# Patient Record
Sex: MALE | Race: WHITE | Employment: OTHER | URBAN - NONMETROPOLITAN AREA
[De-identification: names, ages, dates, MRNs, and addresses within clinical notes are randomized per-mention and may not be internally consistent; named-entity substitution may affect disease eponyms.]

---

## 2017-04-05 LAB
AVERAGE GLUCOSE: NORMAL
CREATININE URINE: NORMAL MG/DL
HBA1C MFR BLD: 6.2 %
MICROALBUMIN/CREAT 24H UR: 100 MG/G{CREAT}

## 2017-07-18 LAB
AVERAGE GLUCOSE: NORMAL
HBA1C MFR BLD: 5.9 %

## 2017-09-07 LAB — DIABETIC RETINOPATHY: NEGATIVE

## 2017-11-02 LAB
CHOLESTEROL, TOTAL: 129 MG/DL
CHOLESTEROL/HDL RATIO: 4
HDLC SERPL-MCNC: 32 MG/DL (ref 35–70)
LDL CHOLESTEROL CALCULATED: 50 MG/DL (ref 0–160)
TRIGL SERPL-MCNC: 233 MG/DL
VLDLC SERPL CALC-MCNC: ABNORMAL MG/DL

## 2017-11-20 LAB
AVERAGE GLUCOSE: NORMAL
HBA1C MFR BLD: 6 %

## 2018-05-17 LAB
AVERAGE GLUCOSE: NORMAL
HBA1C MFR BLD: 6.1 %

## 2019-06-04 PROBLEM — E11.9 TYPE 2 DIABETES MELLITUS WITHOUT COMPLICATION, WITHOUT LONG-TERM CURRENT USE OF INSULIN (HCC): Status: ACTIVE | Noted: 2019-06-04

## 2019-06-04 PROBLEM — K21.9 GASTROESOPHAGEAL REFLUX DISEASE WITHOUT ESOPHAGITIS: Status: ACTIVE | Noted: 2019-06-04

## 2019-06-04 PROBLEM — E78.5 HYPERLIPIDEMIA: Status: ACTIVE | Noted: 2019-06-04

## 2019-06-04 PROBLEM — I10 ESSENTIAL HYPERTENSION: Status: ACTIVE | Noted: 2019-06-04

## 2019-06-04 PROBLEM — I63.9 CVA (CEREBRAL VASCULAR ACCIDENT) (HCC): Status: ACTIVE | Noted: 2019-06-04

## 2019-06-04 PROBLEM — R41.3 MEMORY LOSS: Status: ACTIVE | Noted: 2019-06-04

## 2019-06-04 PROBLEM — M54.50 LOW BACK PAIN: Status: ACTIVE | Noted: 2019-06-04

## 2019-06-11 RX ORDER — ATORVASTATIN CALCIUM 40 MG/1
40 TABLET, FILM COATED ORAL DAILY
COMMUNITY
Start: 2019-04-09 | End: 2019-06-13 | Stop reason: SDUPTHER

## 2019-06-11 RX ORDER — CYCLOBENZAPRINE HCL 10 MG
10 TABLET ORAL 3 TIMES DAILY
COMMUNITY
Start: 2019-04-10 | End: 2019-06-13 | Stop reason: SDUPTHER

## 2019-06-11 RX ORDER — VITAMIN E 268 MG
400 CAPSULE ORAL DAILY
COMMUNITY

## 2019-06-11 RX ORDER — LISINOPRIL 20 MG/1
20 TABLET ORAL DAILY
COMMUNITY
Start: 2019-04-09 | End: 2019-06-13 | Stop reason: SDUPTHER

## 2019-06-11 RX ORDER — HYDRALAZINE HYDROCHLORIDE 25 MG/1
25 TABLET, FILM COATED ORAL 3 TIMES DAILY
COMMUNITY
Start: 2019-03-19 | End: 2019-06-13 | Stop reason: SDUPTHER

## 2019-06-11 RX ORDER — AMLODIPINE BESYLATE 10 MG/1
10 TABLET ORAL DAILY
COMMUNITY
Start: 2019-04-09 | End: 2019-06-13 | Stop reason: SDUPTHER

## 2019-06-11 RX ORDER — MAGNESIUM OXIDE 400 MG/1
400 TABLET ORAL DAILY
COMMUNITY

## 2019-06-11 RX ORDER — DONEPEZIL HYDROCHLORIDE 5 MG/1
5 TABLET, FILM COATED ORAL DAILY
COMMUNITY
Start: 2019-03-19 | End: 2019-06-13 | Stop reason: SDUPTHER

## 2019-06-11 RX ORDER — LANOLIN ALCOHOL/MO/W.PET/CERES
1000 CREAM (GRAM) TOPICAL DAILY
COMMUNITY

## 2019-06-11 RX ORDER — CLOPIDOGREL BISULFATE 75 MG/1
75 TABLET ORAL DAILY
COMMUNITY
Start: 2019-03-19 | End: 2019-06-13 | Stop reason: SDUPTHER

## 2019-06-13 ENCOUNTER — OFFICE VISIT (OUTPATIENT)
Dept: INTERNAL MEDICINE | Age: 64
End: 2019-06-13
Payer: COMMERCIAL

## 2019-06-13 VITALS
RESPIRATION RATE: 16 BRPM | HEART RATE: 104 BPM | DIASTOLIC BLOOD PRESSURE: 100 MMHG | SYSTOLIC BLOOD PRESSURE: 122 MMHG | HEIGHT: 70 IN | WEIGHT: 218 LBS | BODY MASS INDEX: 31.21 KG/M2

## 2019-06-13 DIAGNOSIS — E83.42 HYPOMAGNESEMIA: ICD-10-CM

## 2019-06-13 DIAGNOSIS — E78.5 HYPERLIPIDEMIA, UNSPECIFIED HYPERLIPIDEMIA TYPE: ICD-10-CM

## 2019-06-13 DIAGNOSIS — I10 ESSENTIAL HYPERTENSION: Primary | ICD-10-CM

## 2019-06-13 DIAGNOSIS — Z87.891 PERSONAL HISTORY OF TOBACCO USE: ICD-10-CM

## 2019-06-13 DIAGNOSIS — Z12.5 ENCOUNTER FOR SCREENING FOR MALIGNANT NEOPLASM OF PROSTATE: ICD-10-CM

## 2019-06-13 DIAGNOSIS — I63.9 CEREBROVASCULAR ACCIDENT (CVA), UNSPECIFIED MECHANISM (HCC): ICD-10-CM

## 2019-06-13 DIAGNOSIS — M54.5 LOW BACK PAIN, UNSPECIFIED BACK PAIN LATERALITY, UNSPECIFIED CHRONICITY, WITH SCIATICA PRESENCE UNSPECIFIED: ICD-10-CM

## 2019-06-13 DIAGNOSIS — R41.3 MEMORY LOSS: ICD-10-CM

## 2019-06-13 DIAGNOSIS — Z86.39 HISTORY OF VITAMIN B DEFICIENCY: ICD-10-CM

## 2019-06-13 DIAGNOSIS — E11.9 TYPE 2 DIABETES MELLITUS WITHOUT COMPLICATION, WITHOUT LONG-TERM CURRENT USE OF INSULIN (HCC): ICD-10-CM

## 2019-06-13 DIAGNOSIS — E55.9 VITAMIN D DEFICIENCY: ICD-10-CM

## 2019-06-13 PROCEDURE — 2022F DILAT RTA XM EVC RTNOPTHY: CPT | Performed by: INTERNAL MEDICINE

## 2019-06-13 PROCEDURE — 3046F HEMOGLOBIN A1C LEVEL >9.0%: CPT | Performed by: INTERNAL MEDICINE

## 2019-06-13 PROCEDURE — 3017F COLORECTAL CA SCREEN DOC REV: CPT | Performed by: INTERNAL MEDICINE

## 2019-06-13 PROCEDURE — G0296 VISIT TO DETERM LDCT ELIG: HCPCS | Performed by: INTERNAL MEDICINE

## 2019-06-13 PROCEDURE — 1036F TOBACCO NON-USER: CPT | Performed by: INTERNAL MEDICINE

## 2019-06-13 PROCEDURE — G8417 CALC BMI ABV UP PARAM F/U: HCPCS | Performed by: INTERNAL MEDICINE

## 2019-06-13 PROCEDURE — 99204 OFFICE O/P NEW MOD 45 MIN: CPT | Performed by: INTERNAL MEDICINE

## 2019-06-13 PROCEDURE — G8427 DOCREV CUR MEDS BY ELIG CLIN: HCPCS | Performed by: INTERNAL MEDICINE

## 2019-06-13 PROCEDURE — G8598 ASA/ANTIPLAT THER USED: HCPCS | Performed by: INTERNAL MEDICINE

## 2019-06-13 RX ORDER — HYDRALAZINE HYDROCHLORIDE 25 MG/1
25 TABLET, FILM COATED ORAL 3 TIMES DAILY
Qty: 270 TABLET | Refills: 3 | Status: SHIPPED | OUTPATIENT
Start: 2019-06-13 | End: 2020-06-09

## 2019-06-13 RX ORDER — CYCLOBENZAPRINE HCL 10 MG
10 TABLET ORAL 3 TIMES DAILY
Qty: 270 TABLET | Refills: 3 | Status: SHIPPED | OUTPATIENT
Start: 2019-06-13 | End: 2020-06-01

## 2019-06-13 RX ORDER — LISINOPRIL 20 MG/1
20 TABLET ORAL DAILY
Qty: 90 TABLET | Refills: 3 | Status: SHIPPED | OUTPATIENT
Start: 2019-06-13 | End: 2020-06-09

## 2019-06-13 RX ORDER — AMLODIPINE BESYLATE 10 MG/1
10 TABLET ORAL DAILY
Qty: 90 TABLET | Refills: 3 | Status: SHIPPED | OUTPATIENT
Start: 2019-06-13 | End: 2020-06-09

## 2019-06-13 RX ORDER — CLOPIDOGREL BISULFATE 75 MG/1
75 TABLET ORAL DAILY
Qty: 90 TABLET | Refills: 3 | Status: SHIPPED | OUTPATIENT
Start: 2019-06-13 | End: 2020-06-09

## 2019-06-13 RX ORDER — ATORVASTATIN CALCIUM 40 MG/1
40 TABLET, FILM COATED ORAL DAILY
Qty: 90 TABLET | Refills: 3 | Status: SHIPPED | OUTPATIENT
Start: 2019-06-13 | End: 2020-06-09

## 2019-06-13 RX ORDER — DONEPEZIL HYDROCHLORIDE 5 MG/1
5 TABLET, FILM COATED ORAL DAILY
Qty: 90 TABLET | Refills: 3 | Status: SHIPPED | OUTPATIENT
Start: 2019-06-13 | End: 2019-09-13 | Stop reason: SDUPTHER

## 2019-06-13 ASSESSMENT — ENCOUNTER SYMPTOMS
CONSTIPATION: 0
SHORTNESS OF BREATH: 0
EYE PAIN: 0
COUGH: 0
BACK PAIN: 0
NAUSEA: 0
VOMITING: 0
DIARRHEA: 0
BLOOD IN STOOL: 0
ABDOMINAL PAIN: 0

## 2019-06-13 ASSESSMENT — PATIENT HEALTH QUESTIONNAIRE - PHQ9
SUM OF ALL RESPONSES TO PHQ QUESTIONS 1-9: 0
1. LITTLE INTEREST OR PLEASURE IN DOING THINGS: 0
SUM OF ALL RESPONSES TO PHQ9 QUESTIONS 1 & 2: 0
2. FEELING DOWN, DEPRESSED OR HOPELESS: 0
SUM OF ALL RESPONSES TO PHQ QUESTIONS 1-9: 0

## 2019-06-13 NOTE — PROGRESS NOTES
[id number] Mireya [email or web address]  [facility]  Dept: [phone]  Dept Fax: [phone]  Loc: [phone]     Babak You is a 66 y.o. male who presents today for his medical conditions/complaintsas noted below. Babak You is c/o of   Chief Complaint   Patient presents with    Hypertension     New Patient    Diabetes    Hyperlipidemia         HPI:     Hypertension   This is a chronic (essential htn) problem. The current episode started more than 1 year ago. The problem has been waxing and waning since onset. The problem is controlled. Pertinent negatives include no chest pain, headaches, neck pain, palpitations or shortness of breath. Diabetes   He presents for his follow-up diabetic visit. He has type 2 diabetes mellitus. His disease course has been fluctuating. Pertinent negatives for hypoglycemia include no confusion, dizziness, headaches, nervousness/anxiousness or pallor. Pertinent negatives for diabetes include no chest pain, no polydipsia, no polyuria and no weakness. Hyperlipidemia   This is a chronic problem. The current episode started more than 1 year ago. The problem is controlled. Recent lipid tests were reviewed and are variable. Pertinent negatives include no chest pain or shortness of breath.        Hemoglobin A1C (%)   Date Value   05/17/2018 6.1   11/20/2017 6.0   07/18/2017 5.9            No results found for: LABMICR  LDL Calculated (mg/dL)   Date Value   11/02/2017 50         No results found for: AST, ALT, BUN  BP Readings from Last 3 Encounters:   06/13/19 (!) 122/100              Past Medical History:   Diagnosis Date    CVA (cerebral vascular accident) ([address]     Diabetes mellitus ([address]     History of back surgery     Measles     Osteoarthritis       Past Surgical History:   Procedure Laterality Date    BACK SURGERY      HERNIA REPAIR         Family History   Problem Relation Age of Onset    High Blood Pressure Mother    Devyn Fajardo Diabetes Maternal Grandmother           Social History     Tobacco Use    Smoking status: Former Smoker     Packs/day: 2.00     Years: 30.00     Pack years: 60.00     Types: Cigarettes     Start date: 1986     Last attempt to quit: 06/2016     Years since quitting: 3.0    Smokeless tobacco: Never Used   Substance Use Topics    Alcohol use: Not on file         Current Outpatient Medications   Medication Sig Dispense Refill    amLODIPine (NORVASC) 10 MG tablet Take 1 tablet by mouth daily 90 tablet 3    atorvastatin (LIPITOR) 40 MG tablet Take 1 tablet by mouth daily 90 tablet 3    clopidogrel (PLAVIX) 75 MG tablet Take 1 tablet by mouth daily 90 tablet 3    cyclobenzaprine (FLEXERIL) 10 MG tablet Take 1 tablet by mouth 3 times daily 270 tablet 3    donepezil (ARICEPT) 5 MG tablet Take 1 tablet by mouth daily 90 tablet 3    hydrALAZINE (APRESOLINE) 25 MG tablet Take 1 tablet by mouth 3 times daily 270 tablet 3    metFORMIN (GLUCOPHAGE) 500 MG tablet Take 1 tablet by mouth 2 times daily 180 tablet 3    lisinopril (PRINIVIL;ZESTRIL) 20 MG tablet Take 1 tablet by mouth daily 90 tablet 3    aspirin 81 MG tablet Take 81 mg by mouth daily      vitamin B-12 (CYANOCOBALAMIN) 1000 MCG tablet Take 1,000 mcg by mouth daily      Cholecalciferol (VITAMIN D3) 5000 units TABS Take 5,000 Units by mouth daily      vitamin E 400 UNIT capsule Take 400 Units by mouth daily      magnesium oxide (MAG-OX) 400 MG tablet Take 400 mg by mouth daily       No current facility-administered medications for this visit.       No Known Allergies    Health Maintenance   Topic Date Due    Potassium monitoring  1955    Creatinine monitoring  1955    Diabetic foot exam  06/14/1967    Colon cancer screen colonoscopy  06/14/2007    Low dose CT lung screening  06/14/2012    Diabetic microalbuminuria test  04/05/2018    Lipid screen  11/02/2018    A1C test (Diabetic or Prediabetic)  05/17/2019    DTaP/Tdap/Td vaccine (1 - Tdap) 06/13/2020 (Originally 6/14/1976)    Shingles Vaccine (1 of 2) 06/13/2020 (Originally 6/14/2007)    Pneumococcal 0-64 years Vaccine (1 of 1 - PPSV23) 06/13/2020 (Originally 6/14/1963)    Hepatitis C screen  06/13/2020 (Originally 1955)    HIV screen  06/13/2020 (Originally 6/14/1972)    Flu vaccine (Season Ended) 09/01/2019    Diabetic retinal exam  09/07/2019       Subjective:      Review of Systems   Constitutional: Negative for chills and fever. HENT: Negative for hearing loss. Eyes: Negative for pain and visual disturbance. Respiratory: Negative for cough and shortness of breath. Cardiovascular: Negative for chest pain, palpitations and leg swelling. Gastrointestinal: Negative for abdominal pain, blood in stool, constipation, diarrhea, nausea and vomiting. Endocrine: Negative for cold intolerance, polydipsia and polyuria. Genitourinary: Negative for difficulty urinating, dysuria and hematuria. Musculoskeletal: Negative for arthralgias, back pain, gait problem and neck pain. Skin: Negative for pallor and rash. Neurological: Negative for dizziness, weakness, numbness and headaches. Hematological: Negative for adenopathy. Does not bruise/bleed easily. Psychiatric/Behavioral: Negative for confusion. The patient is not nervous/anxious. Objective:     Physical Exam   Constitutional: He is oriented to person, place, and time. He appears well-developed and well-nourished. HENT:   Head: Normocephalic and atraumatic. Eyes: Pupils are equal, round, and reactive to light. EOM are normal.   Neck: Neck supple. Cardiovascular: Normal rate and regular rhythm. Exam reveals no gallop and no friction rub. No murmur heard. Pulmonary/Chest: Effort normal and breath sounds normal. He has no wheezes. He has no rales. Abdominal: Soft. He exhibits no distension and no mass. There is no tenderness. There is no rebound. Musculoskeletal: Normal range of motion.  He exhibits no Metabolic Panel     Standing Status:   Future     Standing Expiration Date:   6/12/2020    Lipid Panel     Standing Status:   Future     Standing Expiration Date:   6/12/2020     Order Specific Question:   Is Patient Fasting?/# of Hours     Answer:   yes    Vitamin D 25 Hydroxy     Standing Status:   Future     Standing Expiration Date:   6/12/2020    Magnesium     Standing Status:   Future     Standing Expiration Date:   6/12/2020    Psa screening     Standing Status:   Future     Standing Expiration Date:   6/12/2020    Hemoglobin A1C     Standing Status:   Future     Standing Expiration Date:   6/12/2020    Microalbumin, Ur     Standing Status:   Future     Standing Expiration Date:   6/12/2020    Hemoglobin A1C     Standing Status:   Future     Standing Expiration Date:   6/13/2020     DIABETES FOOT EXAM     Orders Placed This Encounter   Medications    amLODIPine (NORVASC) 10 MG tablet     Sig: Take 1 tablet by mouth daily     Dispense:  90 tablet     Refill:  3    atorvastatin (LIPITOR) 40 MG tablet     Sig: Take 1 tablet by mouth daily     Dispense:  90 tablet     Refill:  3    clopidogrel (PLAVIX) 75 MG tablet     Sig: Take 1 tablet by mouth daily     Dispense:  90 tablet     Refill:  3    cyclobenzaprine (FLEXERIL) 10 MG tablet     Sig: Take 1 tablet by mouth 3 times daily     Dispense:  270 tablet     Refill:  3    donepezil (ARICEPT) 5 MG tablet     Sig: Take 1 tablet by mouth daily     Dispense:  90 tablet     Refill:  3    hydrALAZINE (APRESOLINE) 25 MG tablet     Sig: Take 1 tablet by mouth 3 times daily     Dispense:  270 tablet     Refill:  3    metFORMIN (GLUCOPHAGE) 500 MG tablet     Sig: Take 1 tablet by mouth 2 times daily     Dispense:  180 tablet     Refill:  3    lisinopril (PRINIVIL;ZESTRIL) 20 MG tablet     Sig: Take 1 tablet by mouth daily     Dispense:  90 tablet     Refill:  3       Patientgiven educational materials - see patient instructions.   Discussed use, benefit,and side effects of prescribed medications. All patient questions answered. Ptvoiced understanding. Reviewed health maintenance. Instructed to continue currentmedications, diet and exercise. Patient agreed with treatment plan. Follow up asdirected.      Electronically signed by Danielle Hernandez MD on 6/13/2019 at 3:50 PM

## 2019-06-19 ENCOUNTER — TELEPHONE (OUTPATIENT)
Dept: ONCOLOGY | Age: 64
End: 2019-06-19

## 2019-06-19 NOTE — LETTER
6/19/2019        Alisa Gore  [address]  [address] [place] [address]    Dear Alisa Gore: Your healthcare provider has ordered a low dose CT scan of the chest for lung cancer screening. You will find enclosed, information about CT lung screening. Please review the statement of understanding, you will be asked to sign a copy of this at the time of your CT scan    If you have not already been contacted to make the appointment for your scan, please call our scheduling department at [phone]    Keep in mind that CT lung screening does not take the place of smoking cessation. If you are a current smoker, you will find enclosed smoking cessation resources. Please do not hesitate to contact me if you have any questions or concerns.     [address] [facility],      [facility] Screening [facility]  [phone]

## 2019-06-25 ENCOUNTER — HOSPITAL ENCOUNTER (OUTPATIENT)
Dept: CT IMAGING | Age: 64
Discharge: HOME | End: 2019-06-27
Payer: MEDICARE

## 2019-06-25 DIAGNOSIS — Z87.891 PERSONAL HISTORY OF TOBACCO USE: ICD-10-CM

## 2019-06-25 PROCEDURE — G0297 LDCT FOR LUNG CA SCREEN: HCPCS

## 2019-06-26 ENCOUNTER — OFFICE VISIT (OUTPATIENT)
Dept: OPHTHALMOLOGY | Age: 64
End: 2019-06-26
Payer: MEDICARE

## 2019-06-26 DIAGNOSIS — E11.9 TYPE 2 DIABETES MELLITUS WITHOUT COMPLICATION, WITHOUT LONG-TERM CURRENT USE OF INSULIN (HCC): ICD-10-CM

## 2019-06-26 DIAGNOSIS — H35.033 HYPERTENSIVE RETINOPATHY OF BOTH EYES: ICD-10-CM

## 2019-06-26 DIAGNOSIS — H25.813 COMBINED FORMS OF AGE-RELATED CATARACT OF BOTH EYES: ICD-10-CM

## 2019-06-26 DIAGNOSIS — H53.461 RIGHT HOMONYMOUS HEMIANOPSIA: Primary | ICD-10-CM

## 2019-06-26 DIAGNOSIS — I63.9 CEREBROVASCULAR ACCIDENT (CVA), UNSPECIFIED MECHANISM (HCC): ICD-10-CM

## 2019-06-26 PROCEDURE — G8417 CALC BMI ABV UP PARAM F/U: HCPCS | Performed by: OPHTHALMOLOGY

## 2019-06-26 PROCEDURE — 99204 OFFICE O/P NEW MOD 45 MIN: CPT | Performed by: OPHTHALMOLOGY

## 2019-06-26 PROCEDURE — G8427 DOCREV CUR MEDS BY ELIG CLIN: HCPCS | Performed by: OPHTHALMOLOGY

## 2019-06-26 PROCEDURE — 3017F COLORECTAL CA SCREEN DOC REV: CPT | Performed by: OPHTHALMOLOGY

## 2019-06-26 PROCEDURE — 2024F 7 FLD RTA PHOTO EVC RTNOPTHY: CPT | Performed by: OPHTHALMOLOGY

## 2019-06-26 PROCEDURE — G8598 ASA/ANTIPLAT THER USED: HCPCS | Performed by: OPHTHALMOLOGY

## 2019-06-26 PROCEDURE — 3046F HEMOGLOBIN A1C LEVEL >9.0%: CPT | Performed by: OPHTHALMOLOGY

## 2019-06-26 PROCEDURE — 1036F TOBACCO NON-USER: CPT | Performed by: OPHTHALMOLOGY

## 2019-06-26 PROCEDURE — 92250 FUNDUS PHOTOGRAPHY W/I&R: CPT | Performed by: OPHTHALMOLOGY

## 2019-06-26 RX ORDER — TROPICAMIDE 10 MG/ML
1 SOLUTION/ DROPS OPHTHALMIC ONCE
Status: DISCONTINUED | OUTPATIENT
Start: 2019-06-26 | End: 2021-07-06

## 2019-06-26 RX ORDER — PHENYLEPHRINE HCL 2.5 %
1 DROPS OPHTHALMIC (EYE) ONCE
Status: DISCONTINUED | OUTPATIENT
Start: 2019-06-26 | End: 2021-07-06

## 2019-06-26 ASSESSMENT — SLIT LAMP EXAM - LIDS
COMMENTS: MILD BLEPHARITIS. MILD MGD
COMMENTS: MILD BLEPHARITIS. MILD MGD

## 2019-06-26 ASSESSMENT — CONF VISUAL FIELD
OD_SUPERIOR_NASAL_RESTRICTION: 1
OS_INFERIOR_TEMPORAL_RESTRICTION: 1
OS_SUPERIOR_TEMPORAL_RESTRICTION: 1
OD_INFERIOR_NASAL_RESTRICTION: 1

## 2019-06-26 ASSESSMENT — ENCOUNTER SYMPTOMS
RESPIRATORY NEGATIVE: 0
ALLERGIC/IMMUNOLOGIC NEGATIVE: 0
GASTROINTESTINAL NEGATIVE: 0
EYES NEGATIVE: 0

## 2019-06-26 ASSESSMENT — TONOMETRY
OD_IOP_MMHG: 18
OS_IOP_MMHG: 19
IOP_METHOD: NON-CONTACT AIR PUFF

## 2019-06-26 ASSESSMENT — VISUAL ACUITY
CORRECTION_TYPE: GLASSES
OS_CC+: -1
OD_PH_CC: 20/25
METHOD: SNELLEN - LINEAR
OS_CC: 20/40
OS_PH_CC: 20/25

## 2019-06-26 NOTE — PROGRESS NOTES
Ijeoma Norton kae 60 y.o. male here for a complete eye exam.      Chief Complaint   Patient presents with   Alexandra Olguin Diabetic Exam    Ophthalmic Testing       HPI     Patient is here today to establish care and would like a Diabetic eye exam,     Last a1C was 05/17/18    6.1  Last complete exam: 09/18  Last glasses rx: 3+ years   Cataract sx: na  Pt had a stroke sept 2016 and since then pt has had some poor vision    Pt states that both eyes are blurry for several years with no improvement with eyeglasses. Review of Systems  ROS     Positive for: HENT    Negative for: Constitutional, Gastrointestinal, Neurological, Skin, Genitourinary, Musculoskeletal, Endocrine, Cardiovascular, Eyes, Respiratory, Psychiatric, Allergic/Imm, Heme/Lymph          Main Ophthalmology Exam     Slit Lamp Exam       Right Left    Lids/Lashes Mild Blepharitis. Mild MGD Mild Blepharitis.  Mild MGD    Conjunctiva/Sclera Clear and white Clear and white    Cornea Clear Clear    Anterior Chamber Deep, no cells, no flare Deep, no cells, no flare    Iris Round and reactive Round and reactive    Lens 2+ Nuclear sclerosis, 2+ Cortical cataract, 1+ Posterior subcapsular cataract 2+ Nuclear sclerosis, 2+ Cortical cataract, 1+ Posterior subcapsular cataract          Fundus Exam       Right Left    Vitreous Vitreous syneresis Vitreous syneresis    Disc No edema and no pallor No edema and no pallor    C/D Ratio Vertical 0.4 0.4    C/D Ratio Horizontal 0.35 0.35    Macula Normal architecture Normal architecture    Vessels AV nicking AV nicking    Periphery No breaks, tears, or detachments No breaks, tears, or detachments                   Tonometry     Tonometry (Non-contact air puff, 3:03 PM)       Right Left    Pressure 18 19              Visual Acuity     Visual Acuity (Snellen - Linear)       Right Left    Dist cc 20/30 20/40 -1    Dist ph cc 20/25 20/25    Correction:  Glasses               Not recorded         Confrontational Visual Fields     Visual Fields       Right Left    Restrictions Total superior nasal, inferior nasal deficiencies Total superior temporal, inferior temporal deficiencies               Extraocular Movement     Extraocular Movement       Right Left     Full, Ortho Full, Ortho               Not recorded          Past Medical History:   Diagnosis Date    [facility]) [facility]     Diabetes mellitus ([facility]     History of back surgery     Measles     Osteoarthritis           Current Outpatient Medications:     amLODIPine (NORVASC) 10 MG tablet, Take 1 tablet by mouth daily, Disp: 90 tablet, Rfl: 3    atorvastatin (LIPITOR) 40 MG tablet, Take 1 tablet by mouth daily, Disp: 90 tablet, Rfl: 3    clopidogrel (PLAVIX) 75 MG tablet, Take 1 tablet by mouth daily, Disp: 90 tablet, Rfl: 3    cyclobenzaprine (FLEXERIL) 10 MG tablet, Take 1 tablet by mouth 3 times daily, Disp: 270 tablet, Rfl: 3    donepezil (ARICEPT) 5 MG tablet, Take 1 tablet by mouth daily, Disp: 90 tablet, Rfl: 3    hydrALAZINE (APRESOLINE) 25 MG tablet, Take 1 tablet by mouth 3 times daily, Disp: 270 tablet, Rfl: 3    metFORMIN (GLUCOPHAGE) 500 MG tablet, Take 1 tablet by mouth 2 times daily, Disp: 180 tablet, Rfl: 3    lisinopril (PRINIVIL;ZESTRIL) 20 MG tablet, Take 1 tablet by mouth daily, Disp: 90 tablet, Rfl: 3    aspirin 81 MG tablet, Take 81 mg by mouth daily, Disp: , Rfl:     vitamin B-12 (CYANOCOBALAMIN) 1000 MCG tablet, Take 1,000 mcg by mouth daily, Disp: , Rfl:     Cholecalciferol (VITAMIN D3) 5000 units TABS, Take 5,000 Units by mouth daily, Disp: , Rfl:     vitamin E 400 UNIT capsule, Take 400 Units by mouth daily, Disp: , Rfl:     magnesium oxide (MAG-OX) 400 MG tablet, Take 400 mg by mouth daily, Disp: , Rfl:      No Known Allergies     IMPRESSION:  1. Right homonymous hemianopsia    2. Hypertensive retinopathy of both eyes    3. Cerebrovascular accident (CVA), unspecified mechanism ([facility]    4.  Combined forms of various IOL options including:    - Limbal Relaxing Incisions  - Monofocal Lenses  - Premium Lenses   - Toric Lenses    Counseled pt regarding pre-operative medications and  post-operative instructions. Dilated Pupil Size:    OD: 6  OS: 5    Malyugin Ring:  Possible    Trypan Blue: Possible    MiLoop: Possible    Planned Surgery:  Right Eye First    5. Type 2 diabetes mellitus without complication, without long-term current use of insulin ([address]    Counseled pt regarding Diabetes, Diabetic Eye Disease, and the   potential for significant loss of vision. Advised pt to follow up with primary care provider and to keep   blood sugar, blood pressure, and serum lipids as close to   normal as possible.     Electronically signed by Halley Guevara MD on 6/26/2019 at 3:31 PM

## 2019-07-02 ENCOUNTER — TELEPHONE (OUTPATIENT)
Dept: INTERNAL MEDICINE | Age: 64
End: 2019-07-02

## 2019-07-02 ENCOUNTER — HOSPITAL ENCOUNTER (OUTPATIENT)
Dept: LAB | Age: 64
Discharge: HOME | End: 2019-07-02
Payer: MEDICARE

## 2019-07-02 DIAGNOSIS — I10 ESSENTIAL HYPERTENSION: ICD-10-CM

## 2019-07-02 DIAGNOSIS — E11.9 TYPE 2 DIABETES MELLITUS WITHOUT COMPLICATION, WITHOUT LONG-TERM CURRENT USE OF INSULIN (HCC): ICD-10-CM

## 2019-07-02 DIAGNOSIS — R97.20 ELEVATED PSA: Primary | ICD-10-CM

## 2019-07-02 DIAGNOSIS — E83.42 HYPOMAGNESEMIA: ICD-10-CM

## 2019-07-02 DIAGNOSIS — Z12.5 ENCOUNTER FOR SCREENING FOR MALIGNANT NEOPLASM OF PROSTATE: ICD-10-CM

## 2019-07-02 DIAGNOSIS — E55.9 VITAMIN D DEFICIENCY: ICD-10-CM

## 2019-07-02 DIAGNOSIS — Z86.39 HISTORY OF VITAMIN B DEFICIENCY: ICD-10-CM

## 2019-07-02 DIAGNOSIS — E78.5 HYPERLIPIDEMIA, UNSPECIFIED HYPERLIPIDEMIA TYPE: ICD-10-CM

## 2019-07-02 LAB
ALBUMIN SERPL-MCNC: 4.5 G/DL (ref 3.5–5.2)
ALBUMIN/GLOBULIN RATIO: 1.8 (ref 1–2.5)
ALP BLD-CCNC: 67 U/L (ref 40–129)
ALT SERPL-CCNC: 19 U/L (ref 5–41)
ANION GAP SERPL CALCULATED.3IONS-SCNC: 14 MMOL/L (ref 9–17)
AST SERPL-CCNC: 13 U/L
BILIRUB SERPL-MCNC: 0.47 MG/DL (ref 0.3–1.2)
BUN BLDV-MCNC: 14 MG/DL (ref 8–23)
BUN/CREAT BLD: 14 (ref 9–20)
CALCIUM SERPL-MCNC: 9.7 MG/DL (ref 8.6–10.4)
CHLORIDE BLD-SCNC: 102 MMOL/L (ref 98–107)
CHOLESTEROL/HDL RATIO: 4.1
CHOLESTEROL: 142 MG/DL
CO2: 26 MMOL/L (ref 20–31)
CREAT SERPL-MCNC: 1.01 MG/DL (ref 0.7–1.2)
CREATININE URINE: 227.6 MG/DL (ref 39–259)
ESTIMATED AVERAGE GLUCOSE: 120 MG/DL
FOLATE: 14 NG/ML
GFR AFRICAN AMERICAN: >60 ML/MIN
GFR NON-AFRICAN AMERICAN: >60 ML/MIN
GFR SERPL CREATININE-BSD FRML MDRD: ABNORMAL ML/MIN/{1.73_M2}
GFR SERPL CREATININE-BSD FRML MDRD: ABNORMAL ML/MIN/{1.73_M2}
GLUCOSE BLD-MCNC: 111 MG/DL (ref 70–99)
HBA1C MFR BLD: 5.8 % (ref 4.8–5.9)
HDLC SERPL-MCNC: 35 MG/DL
LDL CHOLESTEROL: 51 MG/DL (ref 0–130)
MAGNESIUM: 1.9 MG/DL (ref 1.6–2.6)
MICROALBUMIN/CREAT 24H UR: 16 MG/L
MICROALBUMIN/CREAT UR-RTO: 7 MCG/MG CREAT
POTASSIUM SERPL-SCNC: 4.3 MMOL/L (ref 3.7–5.3)
PROSTATE SPECIFIC ANTIGEN: 5.86 UG/L
SODIUM BLD-SCNC: 142 MMOL/L (ref 135–144)
TOTAL PROTEIN: 7 G/DL (ref 6.4–8.3)
TRIGL SERPL-MCNC: 280 MG/DL
VITAMIN B-12: 758 PG/ML (ref 232–1245)
VLDLC SERPL CALC-MCNC: ABNORMAL MG/DL (ref 1–30)

## 2019-07-02 PROCEDURE — 80053 COMPREHEN METABOLIC PANEL: CPT

## 2019-07-02 PROCEDURE — 82043 UR ALBUMIN QUANTITATIVE: CPT

## 2019-07-02 PROCEDURE — 80061 LIPID PANEL: CPT

## 2019-07-02 PROCEDURE — 82570 ASSAY OF URINE CREATININE: CPT

## 2019-07-02 PROCEDURE — G0103 PSA SCREENING: HCPCS

## 2019-07-02 PROCEDURE — 82607 VITAMIN B-12: CPT

## 2019-07-02 PROCEDURE — 82746 ASSAY OF FOLIC ACID SERUM: CPT

## 2019-07-02 PROCEDURE — 36415 COLL VENOUS BLD VENIPUNCTURE: CPT

## 2019-07-02 PROCEDURE — 82306 VITAMIN D 25 HYDROXY: CPT

## 2019-07-02 PROCEDURE — 83036 HEMOGLOBIN GLYCOSYLATED A1C: CPT

## 2019-07-02 PROCEDURE — 83735 ASSAY OF MAGNESIUM: CPT

## 2019-07-03 LAB — VITAMIN D 25-HYDROXY: 81 NG/ML (ref 30–100)

## 2019-07-08 ENCOUNTER — HOSPITAL ENCOUNTER (OUTPATIENT)
Dept: LAB | Age: 64
Discharge: HOME | End: 2019-07-08
Payer: MEDICARE

## 2019-07-08 ENCOUNTER — OFFICE VISIT (OUTPATIENT)
Dept: NEUROLOGY | Age: 64
End: 2019-07-08
Payer: MEDICARE

## 2019-07-08 VITALS
WEIGHT: 217.6 LBS | BODY MASS INDEX: 32.23 KG/M2 | OXYGEN SATURATION: 95 % | SYSTOLIC BLOOD PRESSURE: 132 MMHG | HEIGHT: 69 IN | DIASTOLIC BLOOD PRESSURE: 80 MMHG | HEART RATE: 102 BPM

## 2019-07-08 DIAGNOSIS — I63.9 CEREBROVASCULAR ACCIDENT (CVA), UNSPECIFIED MECHANISM (HCC): ICD-10-CM

## 2019-07-08 DIAGNOSIS — E78.5 HYPERLIPIDEMIA, UNSPECIFIED HYPERLIPIDEMIA TYPE: ICD-10-CM

## 2019-07-08 DIAGNOSIS — I63.89 INFARCTION OF TEMPORAL LOBE (HCC): Primary | ICD-10-CM

## 2019-07-08 DIAGNOSIS — I63.9 OCCIPITAL CEREBRAL INFARCTION (HCC): ICD-10-CM

## 2019-07-08 DIAGNOSIS — M54.5 LOW BACK PAIN, UNSPECIFIED BACK PAIN LATERALITY, UNSPECIFIED CHRONICITY, WITH SCIATICA PRESENCE UNSPECIFIED: ICD-10-CM

## 2019-07-08 DIAGNOSIS — H53.461 RIGHT HOMONYMOUS HEMIANOPSIA: ICD-10-CM

## 2019-07-08 DIAGNOSIS — E11.49 TYPE 2 DIABETES MELLITUS WITH OTHER NEUROLOGIC COMPLICATION, WITHOUT LONG-TERM CURRENT USE OF INSULIN (HCC): ICD-10-CM

## 2019-07-08 DIAGNOSIS — Z98.890 HISTORY OF BACK SURGERY: ICD-10-CM

## 2019-07-08 DIAGNOSIS — H35.033 HYPERTENSIVE RETINOPATHY OF BOTH EYES: ICD-10-CM

## 2019-07-08 DIAGNOSIS — R41.3 MEMORY LOSS: ICD-10-CM

## 2019-07-08 DIAGNOSIS — H25.813 COMBINED FORMS OF AGE-RELATED CATARACT OF BOTH EYES: ICD-10-CM

## 2019-07-08 DIAGNOSIS — E11.9 TYPE 2 DIABETES MELLITUS WITHOUT COMPLICATION, WITHOUT LONG-TERM CURRENT USE OF INSULIN (HCC): ICD-10-CM

## 2019-07-08 LAB — TSH SERPL DL<=0.05 MIU/L-ACNC: 3.92 MIU/L (ref 0.3–5)

## 2019-07-08 PROCEDURE — 36415 COLL VENOUS BLD VENIPUNCTURE: CPT

## 2019-07-08 PROCEDURE — 82746 ASSAY OF FOLIC ACID SERUM: CPT

## 2019-07-08 PROCEDURE — 82607 VITAMIN B-12: CPT

## 2019-07-08 PROCEDURE — G8427 DOCREV CUR MEDS BY ELIG CLIN: HCPCS | Performed by: PSYCHIATRY & NEUROLOGY

## 2019-07-08 PROCEDURE — 2024F 7 FLD RTA PHOTO EVC RTNOPTHY: CPT | Performed by: PSYCHIATRY & NEUROLOGY

## 2019-07-08 PROCEDURE — 84443 ASSAY THYROID STIM HORMONE: CPT

## 2019-07-08 PROCEDURE — 99205 OFFICE O/P NEW HI 60 MIN: CPT | Performed by: PSYCHIATRY & NEUROLOGY

## 2019-07-08 PROCEDURE — G8417 CALC BMI ABV UP PARAM F/U: HCPCS | Performed by: PSYCHIATRY & NEUROLOGY

## 2019-07-08 ASSESSMENT — ENCOUNTER SYMPTOMS
EYE PAIN: 0
VISUAL CHANGE: 1
ABDOMINAL DISTENTION: 0
DIARRHEA: 0
APNEA: 0
BLOOD IN STOOL: 0
EYE DISCHARGE: 0
VOICE CHANGE: 0
EYE ITCHING: 0
NAUSEA: 0
BOWEL INCONTINENCE: 0
FACIAL SWELLING: 0
WHEEZING: 0
SWOLLEN GLANDS: 0
VOMITING: 0
SORE THROAT: 0
CONSTIPATION: 0
PHOTOPHOBIA: 0
BACK PAIN: 1
COUGH: 0
TROUBLE SWALLOWING: 0
SHORTNESS OF BREATH: 0
COLOR CHANGE: 0
CHEST TIGHTNESS: 0
SINUS PRESSURE: 0
CHOKING: 0
EYE REDNESS: 0
CHANGE IN BOWEL HABIT: 0
ABDOMINAL PAIN: 0

## 2019-07-08 NOTE — PROGRESS NOTES
[facility]  Neurology  [address] [place] [address]  OZLYW:[phone]   Fax: [phone]    SUBJECTIVE:     PATIENT ID:  Guille Mendenhall is a   LEFT     HANDED 60 y.o. male. Cerebrovascular Accident   This is a chronic problem. Episode onset: SEPT. 2016   The problem has been unchanged. Associated symptoms include numbness and a visual change. Pertinent negatives include no abdominal pain, anorexia, arthralgias, change in bowel habit, chest pain, chills, congestion, coughing, diaphoresis, fatigue, fever, headaches, joint swelling, myalgias, nausea, neck pain, rash, sore throat, swollen glands, urinary symptoms, vertigo, vomiting or weakness. Nothing aggravates the symptoms. Treatments tried: ASA   AND PLAVIX   The treatment provided moderate relief. Neurologic Problem   The patient's primary symptoms include clumsiness, focal sensory loss, a loss of balance, memory loss and a visual change. The patient's pertinent negatives include no altered mental status, focal weakness, near-syncope, slurred speech, syncope or weakness. This is a chronic problem. Episode onset: MORE  THAN   2-3  YEARS. The neurological problem developed insidiously. There was lower extremity, left-sided and right-sided focality noted. Associated symptoms include back pain. Pertinent negatives include no abdominal pain, auditory change, aura, bladder incontinence, bowel incontinence, chest pain, confusion, diaphoresis, dizziness, fatigue, fever, headaches, light-headedness, nausea, neck pain, palpitations, shortness of breath, vertigo or vomiting. Past treatments include medication, bed rest and sleep. The treatment provided mild relief. His past medical history is significant for a CVA and dementia. There is no history of a bleeding disorder, a clotting disorder, head trauma, liver disease, mood changes or seizures.            History obtained from  The patient     And   HIS  WIFE       and other ([facility]     Diabetes mellitus ([facility]     History of back surgery     Measles     Osteoarthritis          Past Surgical History:   Procedure Laterality Date    BACK SURGERY      HERNIA REPAIR           Current Outpatient Medications   Medication Sig Dispense Refill    amLODIPine (NORVASC) 10 MG tablet Take 1 tablet by mouth daily 90 tablet 3    atorvastatin (LIPITOR) 40 MG tablet Take 1 tablet by mouth daily 90 tablet 3    clopidogrel (PLAVIX) 75 MG tablet Take 1 tablet by mouth daily 90 tablet 3    cyclobenzaprine (FLEXERIL) 10 MG tablet Take 1 tablet by mouth 3 times daily 270 tablet 3    donepezil (ARICEPT) 5 MG tablet Take 1 tablet by mouth daily 90 tablet 3    hydrALAZINE (APRESOLINE) 25 MG tablet Take 1 tablet by mouth 3 times daily 270 tablet 3    metFORMIN (GLUCOPHAGE) 500 MG tablet Take 1 tablet by mouth 2 times daily 180 tablet 3    lisinopril (PRINIVIL;ZESTRIL) 20 MG tablet Take 1 tablet by mouth daily 90 tablet 3    aspirin 81 MG tablet Take 81 mg by mouth daily      vitamin B-12 (CYANOCOBALAMIN) 1000 MCG tablet Take 1,000 mcg by mouth daily      Cholecalciferol (VITAMIN D3) 5000 units TABS Take 5,000 Units by mouth daily      vitamin E 400 UNIT capsule Take 400 Units by mouth daily      magnesium oxide (MAG-OX) 400 MG tablet Take 400 mg by mouth daily       Current Facility-Administered Medications   Medication Dose Route Frequency Provider Last Rate Last Dose    phenylephrine (MYDFRIN) 2.5 % ophthalmic solution 1 drop  1 drop Both Eyes Once Yani Gregorio MD        tropicamide (MYDRIACYL) 1 % ophthalmic solution 1 drop  1 drop Both Eyes Once Yani Gregorio MD             No Known Allergies      Family History   Problem Relation Age of Onset    High Blood Pressure Mother     Cataracts Mother     Diabetes Maternal Grandmother          Social History     Socioeconomic History    Marital status:      Spouse name: Not on file    Number of children: Not on file    Years of education: Of  Current  Symptoms  And  Or                  If patient  Develops   Any additional  New  NeurologicalSymptoms                  Or  Significant  Concerns   Should  Call  911 or  Go  To  Emergency  Department  For  Further  Immediate  Evaluation. The   Above  Were  Reviewed  With  Patient     AND  HIS  WIFE      and                       questions  Answered  In  Detail. More   Than  50% of face  To face Time   Was  Spent  On  Counseling                    And   Coordination  Of  Care   Of   Patient's  multiple   Neurological  Problems                         And   Comorbid  Medical   Conditions. Electronically signed by Wei Perrin MD    Board Certified in  Neurology &  In  [facility] ([facility])      DISCLAIMER:   Although every effort was made to ensure the accuracy of this  electronictranscription, some errors in transcription may have occurred. GENERAL PATIENT INSTRUCTIONS:      A Healthy Lifestyle: Care Instructions   Your Care Instructions   A healthy lifestyle can help you feel good, stay at ahealthy weight, and have plenty of energy for both work and play. A healthy lifestyle is something you can share with your whole family.  A healthy lifestyle also can lower your risk for serious health problems, such ashigh blood pressure, heart disease, and diabetes.  You can follow a few steps listed below to improve your health and the health of your family.  Follow-up careis a key part of your treatment and safety. Be sure to make and go to all appointments, and call your doctor if you are having problems. Its also a good idea to know your test results and keep a list of the medicines you take.  How can you care for yourself at home?  Do not eat too much sugar, fat, or fast foods. You can still have dessert and treats nowand then. The goal is moderation.    Start small to improve to 2 drinks a day for men, 1drink a day for women) are okay. But drinking too much can lead to liver problems, high blood pressure, and other health problems.  health   If you have a family, there are many things you can do together to improve your health.  Eat meals together as a family as often as possible.  Eat healthy foods. This includes fruits, vegetables, lean meats and dairy, and whole grains.  Include your family in your fitness plan. Most peoplethink of activities such as jogging or tennis as the way to fitness, but there are many ways you and your family can be more active. Anything that makes you breathe hard and gets your heart pumping is exercise. Here are sometips:   Walk to do errands or to take your child to school or the bus.  Go for a family bike ride after dinner instead of watching TV.  Where can you learn more?  Go toHot Mix [email or web address] and sign in to your [employer] account. Enter A753 in the Search HealthInformation box to learn more about \"A Healthy Lifestyle: Care Instructions. \"     If you do not have anaccount, please click on the \"Sign Up Now\" link.  Current as of: July 26, 2016   Content Version: 11.2   © 9121-6574 [employer]. Care instructions adapted under license by [facility] ([facility]). If you have questions about a medical condition or this instruction, always ask your healthcare professional. [employer] disclaims any warranty or liability for your use of this information.

## 2019-07-09 ENCOUNTER — HOSPITAL ENCOUNTER (OUTPATIENT)
Dept: NEUROLOGY | Age: 64
Discharge: HOME | End: 2019-07-09
Payer: MEDICARE

## 2019-07-09 DIAGNOSIS — H53.461 RIGHT HOMONYMOUS HEMIANOPSIA: ICD-10-CM

## 2019-07-09 DIAGNOSIS — R41.3 MEMORY LOSS: ICD-10-CM

## 2019-07-09 DIAGNOSIS — I63.9 CEREBROVASCULAR ACCIDENT (CVA), UNSPECIFIED MECHANISM (HCC): ICD-10-CM

## 2019-07-09 PROCEDURE — 95813 EEG EXTND MNTR 61-119 MIN: CPT

## 2019-07-09 PROCEDURE — 95957 EEG DIGITAL ANALYSIS: CPT

## 2019-07-10 LAB
FOLATE: 18 NG/ML
VITAMIN B-12: 857 PG/ML (ref 232–1245)

## 2019-07-12 ENCOUNTER — HOSPITAL ENCOUNTER (OUTPATIENT)
Dept: INTERVENTIONAL RADIOLOGY/VASCULAR | Age: 64
Discharge: HOME | End: 2019-07-14
Payer: MEDICARE

## 2019-07-12 ENCOUNTER — HOSPITAL ENCOUNTER (OUTPATIENT)
Dept: CT IMAGING | Age: 64
Discharge: HOME | End: 2019-07-14
Payer: MEDICARE

## 2019-07-12 DIAGNOSIS — I63.9 CEREBROVASCULAR ACCIDENT (CVA), UNSPECIFIED MECHANISM (HCC): ICD-10-CM

## 2019-07-12 DIAGNOSIS — R41.3 MEMORY LOSS: ICD-10-CM

## 2019-07-12 DIAGNOSIS — H53.461 RIGHT HOMONYMOUS HEMIANOPSIA: ICD-10-CM

## 2019-07-12 PROCEDURE — 70450 CT HEAD/BRAIN W/O DYE: CPT

## 2019-07-12 PROCEDURE — 93880 EXTRACRANIAL BILAT STUDY: CPT

## 2019-07-16 ENCOUNTER — OFFICE VISIT (OUTPATIENT)
Dept: UROLOGY | Age: 64
End: 2019-07-16
Payer: MEDICARE

## 2019-07-16 VITALS
BODY MASS INDEX: 32.11 KG/M2 | DIASTOLIC BLOOD PRESSURE: 86 MMHG | SYSTOLIC BLOOD PRESSURE: 130 MMHG | HEART RATE: 102 BPM | HEIGHT: 69 IN | WEIGHT: 216.8 LBS

## 2019-07-16 DIAGNOSIS — R97.20 ELEVATED PSA: Primary | ICD-10-CM

## 2019-07-16 PROCEDURE — G8598 ASA/ANTIPLAT THER USED: HCPCS | Performed by: UROLOGY

## 2019-07-16 PROCEDURE — 3017F COLORECTAL CA SCREEN DOC REV: CPT | Performed by: UROLOGY

## 2019-07-16 PROCEDURE — 1036F TOBACCO NON-USER: CPT | Performed by: UROLOGY

## 2019-07-16 PROCEDURE — G8417 CALC BMI ABV UP PARAM F/U: HCPCS | Performed by: UROLOGY

## 2019-07-16 PROCEDURE — 99204 OFFICE O/P NEW MOD 45 MIN: CPT | Performed by: UROLOGY

## 2019-07-16 PROCEDURE — G8427 DOCREV CUR MEDS BY ELIG CLIN: HCPCS | Performed by: UROLOGY

## 2019-07-16 NOTE — PROGRESS NOTES
Adeline Neville MD        [address] [address]  Dept: [phone]  Dept Fax: [phone]  Loc: [facility] Urology Office Note - New Patient    Patient:  Angel Harvey  YOB: 1955  Date: 7/31/2019    The patient is a 60 y.o. male who presents today for evaluation of the following problems:   Chief Complaint   Patient presents with    Elevated PSA    referred/consultation requested by Rudy Gayle MD.    HISTORY OF PRESENT ILLNESS:     Onset was  Weeks ago  Overall, the problem(s) are worse. Severity is described as moderate. Associated Symptoms: No dysuria, no gross hematuria. Current Pain Severity: 0    Memory issues from previous stroke in 2016. Former smoker  No problems with voiding  No family hx of prostate cancer      Requested/reviewed records from Rudy Gayle MD office and/or outside physician/EMR    (Patient's old records have been requested, reviewed and pertinent findings summarized in today's note.)    Procedures Today: N/A      Last several PSA's:  Lab Results   Component Value Date    PSA 5.86 (H) 07/02/2019       Last total testosterone:  No results found for: TESTOSTERONE    Urinalysis today:  No results found for this visit on 07/16/19.     Last BUN and creatinine:  Lab Results   Component Value Date    BUN 14 07/02/2019     Lab Results   Component Value Date    CREATININE 1.01 07/02/2019       Additional Lab/Culture results: none    Imaging Reviewed during this Office Visit:   Adeline Neville MD independently reviewed the images and verified the radiology reports from:    Ct Head Wo Contrast    Result Date: 7/12/2019  EXAMINATION: CT OF THE HEAD WITHOUT CONTRAST  7/12/2019 4:17 pm TECHNIQUE: CT of the head was performed without the administration of intravenous contrast. Dose modulation, iterative reconstruction, and/or weight based adjustment of the mA/kV was utilized to reduce the radiation dose to as low as reasonably achievable. COMPARISON: None. HISTORY: ORDERING SYSTEM PROVIDED HISTORY: Right homonymous hemianopsia TECHNOLOGIST PROVIDED HISTORY: history of CVA Reason for Exam: Balance and memory loss with vision changes. History of CVA 1 year ago Acuity: Chronic Type of Exam: Initial FINDINGS: BRAIN/VENTRICLES: There is no acute intracranial hemorrhage, mass effect or midline shift. No abnormal extra-axial fluid collection. The gray-white differentiation is maintained without evidence of an acute infarct. There is no evidence of hydrocephalus. Old ischemic strokes with resultant encephalomalacia in the left occipital lobe and posterior aspect of right temporal lobe. ORBITS: The visualized portion of the orbits demonstrate no acute abnormality. SINUSES: The visualized paranasal sinuses and mastoid air cells demonstrate no acute abnormality. SOFT TISSUES/SKULL:  No acute abnormality of the visualized skull or soft tissues. Small sebaceous cyst with calcifications in the subcutaneous fat of the posterior parietal scalp along the midline. No acute intracranial abnormality. Old ischemic strokes with resultant encephalomalacia in the left occipital lobe and posterior aspect of right temporal lobe. Vl Dup Carotid Bilateral    Result Date: 7/12/2019  EXAMINATION: ULTRASOUND EVALUATION OF THE CAROTID ARTERIES 7/12/2019 COMPARISON: None. HISTORY: ORDERING SYSTEM PROVIDED HISTORY: Right homonymous hemianopsia TECHNOLOGIST PROVIDED HISTORY: history of CVA Reason for Exam: right homonymous hemianopsia Acuity: Chronic Type of Exam: Subsequent/Follow-up FINDINGS: RIGHT: The right common carotid artery demonstrates peak systolic velocities of 65, 61 cm/sec in the proximal and distal segments respectively. The right internal carotid artery demonstrates the systolic velocities of 74, 60, 38 cm/sec in the proximal, mid and distal segments respectively. The external carotid artery is patent.

## 2019-08-12 ENCOUNTER — HOSPITAL ENCOUNTER (OUTPATIENT)
Dept: NEUROLOGY | Age: 64
Discharge: HOME | End: 2019-08-12
Payer: MEDICARE

## 2019-08-12 DIAGNOSIS — R41.3 MEMORY LOSS: ICD-10-CM

## 2019-08-12 DIAGNOSIS — I63.89 INFARCTION OF TEMPORAL LOBE (HCC): ICD-10-CM

## 2019-08-12 DIAGNOSIS — H53.461 RIGHT HOMONYMOUS HEMIANOPSIA: ICD-10-CM

## 2019-08-12 DIAGNOSIS — I63.9 OCCIPITAL CEREBRAL INFARCTION (HCC): ICD-10-CM

## 2019-08-12 DIAGNOSIS — I63.9 CEREBROVASCULAR ACCIDENT (CVA), UNSPECIFIED MECHANISM (HCC): ICD-10-CM

## 2019-08-12 PROCEDURE — 93886 INTRACRANIAL COMPLETE STUDY: CPT

## 2019-08-12 PROCEDURE — 93892 TCD EMBOLI DETECT W/O INJ: CPT

## 2019-08-14 NOTE — PROCEDURES
[address]                 [facility],Building [address] [place] [id number]                                      TCD      PATIENT NAME: Nayla Forrest                 :        1955  MED REC NO:   [id number]                             ROOM:  ACCOUNT NO:   [de-identified]                           ADMIT DATE: 2019  PROVIDER:     Lexy Leggett MD    DATE OF STUDY:  2019      TECHNIQUE:  Transcranial Doppler study of intracranial arteries was  performed using [employer] equipment with digital Doppler technology, with  high resolution [address] [place] M-mode display and Multigate Spectral Windows  and 2 MHz Doppler probes via temporal, suboccipital and orbital  approaches. Transcranial Doppler study of the intracranial arteries was also  performed for emboli detection without intravenous microbubble injection  using continuous soundtrack, M-Mode with Multigate Spectral displays and  soundtrack displays with continuous bilateral monitoring. CLINICAL DATA:      The patient is 60years old male with a history of  hypertension, hyperlipidemia, memory loss, type 2 diabetes, stroke,  occipital cerebral infarction, visual impairment, infarction of temporal  lobes. The purpose of the study is to evaluate for stroke, intracranial focal  stenosis, flow abnormalities, vertebrobasilar insufficiency evaluations. SUMMARY:      Mean flow velocities in the right and left anterior, middle,  and posterior cerebral arteries appear appropriate for the patient's age  with normal PI values. Mean flow velocities in the right vertebral artery are borderline with  normal PI values. Mean flow velocities in the left vertebral artery are low with elevated  PI values. Mean flow velocities in the basilar artery are low with  normal PI values.         TCD OF INTRACRANIAL ARTERIES FOR EMBOLI DETECTION:      Review and analysis of the waveforms and continuous soundtrack

## 2019-08-16 ENCOUNTER — HOSPITAL ENCOUNTER (OUTPATIENT)
Dept: LAB | Age: 64
Discharge: HOME | End: 2019-08-16
Payer: COMMERCIAL

## 2019-08-16 DIAGNOSIS — R97.20 ELEVATED PSA: ICD-10-CM

## 2019-08-16 LAB — PROSTATE SPECIFIC ANTIGEN: 1.73 UG/L

## 2019-08-16 PROCEDURE — 36415 COLL VENOUS BLD VENIPUNCTURE: CPT

## 2019-08-16 PROCEDURE — 84153 ASSAY OF PSA TOTAL: CPT

## 2019-08-19 ENCOUNTER — OFFICE VISIT (OUTPATIENT)
Dept: UROLOGY | Age: 64
End: 2019-08-19
Payer: MEDICARE

## 2019-08-19 VITALS
DIASTOLIC BLOOD PRESSURE: 78 MMHG | WEIGHT: 216.71 LBS | HEIGHT: 69 IN | BODY MASS INDEX: 32.1 KG/M2 | HEART RATE: 74 BPM | SYSTOLIC BLOOD PRESSURE: 118 MMHG

## 2019-08-19 DIAGNOSIS — R97.20 ELEVATED PSA: Primary | ICD-10-CM

## 2019-08-19 PROCEDURE — G8598 ASA/ANTIPLAT THER USED: HCPCS | Performed by: UROLOGY

## 2019-08-19 PROCEDURE — 3017F COLORECTAL CA SCREEN DOC REV: CPT | Performed by: UROLOGY

## 2019-08-19 PROCEDURE — G8427 DOCREV CUR MEDS BY ELIG CLIN: HCPCS | Performed by: UROLOGY

## 2019-08-19 PROCEDURE — 1036F TOBACCO NON-USER: CPT | Performed by: UROLOGY

## 2019-08-19 PROCEDURE — G8417 CALC BMI ABV UP PARAM F/U: HCPCS | Performed by: UROLOGY

## 2019-08-19 PROCEDURE — 99212 OFFICE O/P EST SF 10 MIN: CPT | Performed by: UROLOGY

## 2019-08-19 NOTE — PROGRESS NOTES
Benito Jimenez MD        [address] [facility]  [address]  [address] [address]  Dept: [phone]  Dept Fax: [phone]  Loc: [phone]      [facility] Urology Office Note - Follow up patient    Patient:  Guille Mendenhall  YOB: 1955  Date: 8/19/2019    The patient is a 60 y.o. male who presents today for evaluation of the following problems:   Chief Complaint   Patient presents with    Other     6 week PSA    referred/consultation requested by Meryl Martínez MD.    HISTORY OF PRESENT ILLNESS:     Onset was  Weeks ago  Overall, the problem(s) are better  Severity is described as moderate. Associated Symptoms: No dysuria, no gross hematuria. Current Pain Severity: 0    Here with PSA recheck  Doing well, no complaints  PSA has dropped    Summary of Previous Records:  Memory issues from previous stroke in 2016. Former smoker  No problems with voiding  No family hx of prostate cancer      Requested/reviewed records from Meryl Martínez MD office and/or outside physician/EMR    (Patient's old records have been requested, reviewed and pertinent findings summarized in today's note.)    Procedures Today: N/A      Last several PSA's:  Lab Results   Component Value Date    PSA 1.73 08/16/2019    PSA 5.86 (H) 07/02/2019       Last total testosterone:  No results found for: TESTOSTERONE    Urinalysis today:  No results found for this visit on 08/19/19.     Last BUN and creatinine:  Lab Results   Component Value Date    BUN 14 07/02/2019     Lab Results   Component Value Date    CREATININE 1.01 07/02/2019       Additional Lab/Culture results: none    Imaging Reviewed during this Office Visit:   Benito Jimenez MD independently reviewed the images and verified the radiology reports from:    Ct Head Wo Contrast    Result Date: 7/12/2019  EXAMINATION: CT OF THE HEAD WITHOUT CONTRAST  7/12/2019 4:17 pm TECHNIQUE: CT of the head was performed without the administration of Abby Lyn MD

## 2019-09-13 ENCOUNTER — OFFICE VISIT (OUTPATIENT)
Dept: NEUROLOGY | Age: 64
End: 2019-09-13
Payer: MEDICARE

## 2019-09-13 VITALS
BODY MASS INDEX: 32.1 KG/M2 | HEIGHT: 69 IN | WEIGHT: 216.71 LBS | RESPIRATION RATE: 20 BRPM | DIASTOLIC BLOOD PRESSURE: 74 MMHG | HEART RATE: 88 BPM | SYSTOLIC BLOOD PRESSURE: 132 MMHG

## 2019-09-13 DIAGNOSIS — I63.89 INFARCTION OF TEMPORAL LOBE (HCC): ICD-10-CM

## 2019-09-13 DIAGNOSIS — H53.461 RIGHT HOMONYMOUS HEMIANOPSIA: ICD-10-CM

## 2019-09-13 DIAGNOSIS — E78.5 HYPERLIPIDEMIA, UNSPECIFIED HYPERLIPIDEMIA TYPE: ICD-10-CM

## 2019-09-13 DIAGNOSIS — K21.9 GASTROESOPHAGEAL REFLUX DISEASE WITHOUT ESOPHAGITIS: ICD-10-CM

## 2019-09-13 DIAGNOSIS — E11.49 TYPE 2 DIABETES MELLITUS WITH OTHER NEUROLOGIC COMPLICATION, WITHOUT LONG-TERM CURRENT USE OF INSULIN (HCC): ICD-10-CM

## 2019-09-13 DIAGNOSIS — I10 ESSENTIAL HYPERTENSION: ICD-10-CM

## 2019-09-13 DIAGNOSIS — I63.9 OCCIPITAL CEREBRAL INFARCTION (HCC): ICD-10-CM

## 2019-09-13 DIAGNOSIS — I63.9 CEREBROVASCULAR ACCIDENT (CVA), UNSPECIFIED MECHANISM (HCC): Primary | ICD-10-CM

## 2019-09-13 DIAGNOSIS — H25.813 COMBINED FORMS OF AGE-RELATED CATARACT OF BOTH EYES: ICD-10-CM

## 2019-09-13 DIAGNOSIS — R41.3 MEMORY LOSS: ICD-10-CM

## 2019-09-13 DIAGNOSIS — M54.5 LOW BACK PAIN, UNSPECIFIED BACK PAIN LATERALITY, UNSPECIFIED CHRONICITY, WITH SCIATICA PRESENCE UNSPECIFIED: ICD-10-CM

## 2019-09-13 DIAGNOSIS — Z98.890 HISTORY OF BACK SURGERY: ICD-10-CM

## 2019-09-13 DIAGNOSIS — H35.033 HYPERTENSIVE RETINOPATHY OF BOTH EYES: ICD-10-CM

## 2019-09-13 DIAGNOSIS — E11.9 TYPE 2 DIABETES MELLITUS WITHOUT COMPLICATION, WITHOUT LONG-TERM CURRENT USE OF INSULIN (HCC): ICD-10-CM

## 2019-09-13 PROCEDURE — 2024F 7 FLD RTA PHOTO EVC RTNOPTHY: CPT | Performed by: PSYCHIATRY & NEUROLOGY

## 2019-09-13 PROCEDURE — G8598 ASA/ANTIPLAT THER USED: HCPCS | Performed by: PSYCHIATRY & NEUROLOGY

## 2019-09-13 PROCEDURE — 3017F COLORECTAL CA SCREEN DOC REV: CPT | Performed by: PSYCHIATRY & NEUROLOGY

## 2019-09-13 PROCEDURE — 3044F HG A1C LEVEL LT 7.0%: CPT | Performed by: PSYCHIATRY & NEUROLOGY

## 2019-09-13 PROCEDURE — G8427 DOCREV CUR MEDS BY ELIG CLIN: HCPCS | Performed by: PSYCHIATRY & NEUROLOGY

## 2019-09-13 PROCEDURE — 99214 OFFICE O/P EST MOD 30 MIN: CPT | Performed by: PSYCHIATRY & NEUROLOGY

## 2019-09-13 PROCEDURE — G8417 CALC BMI ABV UP PARAM F/U: HCPCS | Performed by: PSYCHIATRY & NEUROLOGY

## 2019-09-13 PROCEDURE — 1036F TOBACCO NON-USER: CPT | Performed by: PSYCHIATRY & NEUROLOGY

## 2019-09-13 RX ORDER — DONEPEZIL HYDROCHLORIDE 10 MG/1
TABLET, FILM COATED ORAL
Qty: 90 TABLET | Refills: 1 | Status: SHIPPED | OUTPATIENT
Start: 2019-09-13 | End: 2020-03-11

## 2019-09-13 ASSESSMENT — ENCOUNTER SYMPTOMS
EYE PAIN: 0
CHANGE IN BOWEL HABIT: 0
EYE ITCHING: 0
BACK PAIN: 1
SORE THROAT: 0
COUGH: 0
ABDOMINAL DISTENTION: 0
CONSTIPATION: 0
VOICE CHANGE: 0
PHOTOPHOBIA: 0
ABDOMINAL PAIN: 0
EYE REDNESS: 0
SINUS PRESSURE: 0
NAUSEA: 0
SWOLLEN GLANDS: 0
COLOR CHANGE: 0
TROUBLE SWALLOWING: 0
VISUAL CHANGE: 1
WHEEZING: 0
CHOKING: 0
CHEST TIGHTNESS: 0
BLOOD IN STOOL: 0
EYE DISCHARGE: 0
DIARRHEA: 0
BOWEL INCONTINENCE: 0
FACIAL SWELLING: 0
SHORTNESS OF BREATH: 0
APNEA: 0
VOMITING: 0

## 2019-09-13 NOTE — PATIENT INSTRUCTIONS
[facility] NEUROLOGY    Due to the complex nature of our neurological testing, It is the policy of the Neurology Department not to release the results of your testing over the phone. Once all testing is completed and we have all the results back, Dr. Amy Patel will then personally go over all the results with you and answer any questions that you may have during a follow up appointment. If you are unable to keep this appointment, please notify the [address] [place] [address] St @ [phone], as soon as possible. Please bring your prescription bottles to all appointments. * FALL   PRECAUTIONS. *   ADEQUATE   FLUID  INTAKE   AND  ELECTROLYTE  BALANCE           * KEEP  DAIRY  OF   THE  NEUROLOGICAL  SYMPTOMS          *  TO  MAINTAIN  REGULAR  SLEEP  WAKE  CYCLES. *   TO  HAVE  ADEQUATE  REST  AND   SLEEP    HOURS.        *    AVOID  USAGE OF   TOBACCO,  EXCESSIVE  ALCOHOL                AND   ILLEGAL   SUBSTANCES,  IF  ANY          *  Maintain   Healthy  Life Style    With   Periodic  Monitoring  Of      Any  Medical  Conditions  Including   Elevated  Blood  Pressure,  Lipid  Profile,     Blood  Sugar levels  And   Heart  Disease. *   Period   Screening  For  Cancers  Involving  Breast,  Colon,   lungs  And  Other  Organs  As  Applicable,  In consultation   With  Your  Primary Care Providers. *  If   There is  Any  Significant  Worsening   Of  Current  Symptoms  And  Or  If    Any additional  New  Neurological  Symptoms                 Or  Significant  Concerns   Should  Call  911 or  Go  To  Emergency  Department  For  Further  Immediate  Evaluation.

## 2019-09-13 NOTE — PROGRESS NOTES
medications/alcohol, time of day/darkness/light  Are  absent             Patient   Indicates   The  Presence   And  The  Absence  Of  The  Following    Associated  And   Additional  Neurological    Symptoms:                                Balance  And coordination   problems  present           Gait problems     absent            Headaches      absent              Migraines           absent           Memory problemspresent              Confusion        absent            Paresthesia   numbness          present           Seizures  And  Starring  Episodes           absent           Syncope,  Near  syncopal episodes         absent           Speech   problems           absent             Swallowing   Problems      absent            Dizziness,  Light headedness           absent              Vertigo        absent             Generalized   Weakness    absent              focal  Weakness     absent             Tremors         absent              Sleep  Problems     absent             History  Of   Recent  Head  Injury     absent             History  Of   Recent  TIA     absent             History  Of   Recent    Stroke     absent             Neck  Pain   and   Neck muscle  Spasms  absent               Radiating  down   And   Weakness           absent            Lower back   Pain  And     Spasms  present              Radiating    Down   And   Weakness          present                H/O    FALLS        absent               History  Of   Visual  Symptoms   PRESENT                    Associated   Diplopia       absent                                               Also   Additional   Symptoms   Present    As  Documented    In   The   detailed                  Review  Of  Systems   And    Please   Refer   To    Them for   Additional    Information.                   Any components  That are either  Unobtainable  Or  Limited  In   HPI, ROS  And/or PFSH   Are                   Due   ToPatient's  Medical  Problems,  Clinical Condition   and/or lack of                                other    Alternate   resources.           RECORDS   REVIEWED:    historical medical records       INFORMATION   REVIEWED:     MEDICAL   HISTORY,SURGICAL   HISTORY,     MEDICATIONS   LIST,   ALLERGIES AND  DRUG  INTOLERANCES,       FAMILY   HISTORY,  SOCIAL  HISTORY,      PROBLEM  LIST   FOR  PATIENT  CARE   COORDINATION      Past Medical History:   Diagnosis Date    [facility]     Diabetes mellitus ([facility]     History of back surgery     Measles     Osteoarthritis          Past Surgical History:   Procedure Laterality Date    BACK SURGERY      HERNIA REPAIR           Current Outpatient Medications   Medication Sig Dispense Refill    amLODIPine (NORVASC) 10 MG tablet Take 1 tablet by mouth daily 90 tablet 3    atorvastatin (LIPITOR) 40 MG tablet Take 1 tablet by mouth daily 90 tablet 3    clopidogrel (PLAVIX) 75 MG tablet Take 1 tablet by mouth daily 90 tablet 3    cyclobenzaprine (FLEXERIL) 10 MG tablet Take 1 tablet by mouth 3 times daily 270 tablet 3    donepezil (ARICEPT) 5 MG tablet Take 1 tablet by mouth daily 90 tablet 3    hydrALAZINE (APRESOLINE) 25 MG tablet Take 1 tablet by mouth 3 times daily 270 tablet 3    metFORMIN (GLUCOPHAGE) 500 MG tablet Take 1 tablet by mouth 2 times daily 180 tablet 3    lisinopril (PRINIVIL;ZESTRIL) 20 MG tablet Take 1 tablet by mouth daily 90 tablet 3    aspirin 81 MG tablet Take 81 mg by mouth daily      vitamin B-12 (CYANOCOBALAMIN) 1000 MCG tablet Take 1,000 mcg by mouth daily      Cholecalciferol (VITAMIN D3) 5000 units TABS Take 5,000 Units by mouth daily      vitamin E 400 UNIT capsule Take 400 Units by mouth daily      magnesium oxide (MAG-OX) 400 MG tablet Take 400 mg by mouth daily       Current Facility-Administered Medications   Medication Dose Route Frequency Provider Last Rate Last Dose    phenylephrine (MYDFRIN) 2.5 % ophthalmic solution 1 drop  1 drop Both Eyes segments respectively.       The external carotid artery is patent.  The vertebral artery demonstrates   normal antegrade flow.       A focus of mild smooth plaque is noted at the origin of the internal carotid   artery. ICA/CCA ratio of 0.7.           Impression   The right internal carotid artery demonstrates 0-50% stenosis.       The left internal carotid artery demonstrates 0-50% stenosis.       Bilateral vertebral arteries are patent with flow in the normal direction.             VISITING DIAGNOSIS:      ICD-10-CM    1. Cerebrovascular accident (CVA), unspecified mechanism ([facility] [address] I63.9    2. Occipital cerebral infarction (HCC) I63.9    3. Infarction of temporal lobe (HCC) I63.89    4. Type 2 diabetes mellitus with other neurologic complication, without long-term current use of insulin (HCC) E11.49    5. Low back pain, unspecified back pain laterality, unspecified chronicity, with sciatica presence unspecified M54.5    6. Memory loss R41.3    7. Essential hypertension I10    8. Hyperlipidemia, unspecified hyperlipidemia type E78.5    9. History of back surgery Z98.890    10. Hypertensive retinopathy of both eyes H35.033    11. Combined forms of age-related cataract of both eyes H25.813    12. Right homonymous hemianopsia H53.461    13. Type 2 diabetes mellitus without complication, without long-term current use of insulin (HCC) E11.9    14. Gastroesophageal reflux disease without esophagitis K21.9               CONCERNS   &   INCREASED   RISK   FOR         * TIA,  CEREBRO  VASCULAR  ISCHEMIA,STROKE      *   COGNITIVE  &   MEMORY PROBLEMS  AND  DEMENTIAS         *   PERIPHERAL  NEUROPATHY,        *   BALANCE PROBLMES                 VARIOUS  RISK   FACTORS   WERE  REVIEWED   AND   DISCUSSED. *  PATIENT   HAS  MULTIPLE   MEDICAL, NEUROLOGICAL      PROBLEMS         PATIENT'S   MANAGEMENT  IS  CHALLENGING.             PLAN:                         Yuliet Oliver  Of  The  Diagnoses,  The  Management & Treatment Options            AND    Care  plan  Were          Reviewed and   Discussed   With  patient. * Goals  And  Expectations  Of  The  Therapy  Discussed   And  Reviewed. *   Benefits   And   Side  Effect  Profile  Of  Medication/s   Were   Discussed                * Need   For  Further   Follow up For  The  Various  Problems Were  discussed. * Results  Of  The  Previous  Diagnostic tests were reviewed and  discussed                 Medical  Decision  Making  Was  Made  Based on the   Complexity  Of  Above  Mentioned  Diagnoses,    Data reviewed             And    Risk  Of  Significant   Co morbidities and   complicating   Factors. Medical  Decision  Was   High    Complexity   Due   To  The  Patient's  Multiple  Symptoms,  Advancing   Disease,  Complex  Treatment  Regimen,  Multiple medications           and   Risk  Of   Side  Effects,  Difficulty  In  Medication  Management  And  Diagnostic  Challenges   In  View  Of  The  Associated   Co  Morbid  Conditions   And  Problems. * FALL   PRECAUTIONS. THESE  REVIEWED   AND  DISCUSSED      *   ABSOLUTELY   NO  DRIVING      *   BE  CAREFUL  WITH  ACTIVITIES            *   AVOID   BACK  STRAINING  ACTIVITIES          *   ADEQUATE   FLUIDINTAKE   AND  ELECTROLYTE  BALANCE         * KEEP  DAIRY  OF   THE  NEUROLOGICAL  SYMPTOMS        RECORDING THE    DURATION  AND  FREQUENCY. *  AVOID    CONDITIONS  AND  FACTORS   THAT  MAKE                  NEUROLOGICAL  SYMPTOMS  WORSE.           *TO  MAINTAIN  REGULAR  SLEEP  WAKE  CYCLES. *   TO  HAVE  ADEQUATE  REST  AND   SLEEP    HOURS.          *    AVOID  ANY USAGE OF    TOBACCO,              EXCESSIVE  ALCOHOL  AND   ILLEGAL   SUBSTANCES          *  CONTINUE   MEDICATIONS    PRESCRIBED   BY NEUROLOGIST    AS    RECOMMENDED       *   Compliance   With  Medications   And  Instructions          * CURRENTLY    TOLERATING  THE  PRESCRIBED   MEDICATIONS. Contact   Neurology  Clinic   Early   If   Are  Any  New  Neurological   And  Any neurological  Concerns. *  If  The  Patient remains  Neurologically  Stable   Return   To  [facility] Neurology Department   IN    3-6        MONTHS  TIME   FOR  FURTHER              FOLLOW UP.                       *   The  Neurological   Findings,  Possible  Diagnosis,  Differential diagnoses                    And  Options  For    Further   Investigations                   And  management   Are  Discussed  Comprehensively. Medications   And  Prescription   Risks  And  Side effects  Are   Also  Discussed. *  If   There is  Any  Significant  Worsening   Of  Current  Symptoms  And  Or                  If patient  Develops   Any additional  New  NeurologicalSymptoms                  Or  Significant  Concerns   Should  Call  911 or  Go  To  Emergency  Department  For  Further  Immediate  Evaluation. The   Above  Were  Reviewed  With  Patient     AND  HIS  WIFE      and                       questions  Answered  In  Detail. More   Than  50% of face  To face Time   Was  Spent  On  Counseling                    And   Coordination  Of  Care   Of   Patient's  multiple   Neurological  Problems                         And   Comorbid  Medical   Conditions. Electronically signed by Tommy Herring MD    Board Certified in  Neurology &  In  [address] of [employer]iat[employer] and Neurology ([employer])      DISCLAIMER:   Although every effort was made to ensure the accuracy of this  electronictranscription, some errors in transcription may have occurred. GENERAL PATIENT INSTRUCTIONS:      A Healthy Lifestyle: Care Instructions   Your Care Instructions   A healthy lifestyle can help you feel good, stay at ahealthy weight, and have plenty of energy for both work and play.  A healthy lifestyle is something you can share with your whole family.  A healthy lifestyle also can lower your risk for serious health problems, such ashigh blood pressure, heart disease, and diabetes.  You can follow a few steps listed below to improve your health and the health of your family.  Follow-up careis a key part of your treatment and safety. Be sure to make and go to all appointments, and call your doctor if you are having problems. Its also a good idea to know your test results and keep a list of the medicines you take.  How can you care for yourself at home?  Do not eat too much sugar, fat, or fast foods. You can still have dessert and treats nowand then. The goal is moderation.  Start small to improve your eating habits. Pay attention to portion sizes, drink less juice and soda pop, and eat more fruits and vegetables.  Eat a healthy amount of food. A 3-ounce serving of meat, for example, is about the size of a deck of cards. Fill the rest of your plate with vegetables and whole grains.  Limit theamount of soda and sports drinks you have every day. Drink more water when you are thirsty.  Eat at least 5 servings of fruits and vegetables every day. It may seem like a lot, but it is not hard to reach this goal. Aserving or helping is 1 piece of fruit, 1 cup of vegetables, or 2 cups of leafy, raw vegetables. Have an apple or some carrot sticks as an afternoon snack instead of a candy bar. Try to have fruits and/or vegetables at everymeal.   Make exercise part of your daily routine. You may want to start with simple activities, such as walking, bicycling, or slow swimming. Try cesar active 30 to 60 minutes every day. You do not need to do all 30 to 60 minutes all at once. For example, you can exercise 3 times a day for 10 or 20 minutes. Moderate exercise is safe for most people, but it is always agood idea to talk to your doctor before starting an exercise program.   Keep moving.  Tiffany Hunger the lawn, work in the

## 2019-10-15 ENCOUNTER — OFFICE VISIT (OUTPATIENT)
Dept: INTERNAL MEDICINE | Age: 64
End: 2019-10-15
Payer: COMMERCIAL

## 2019-10-15 VITALS
WEIGHT: 220 LBS | HEIGHT: 70 IN | HEART RATE: 88 BPM | BODY MASS INDEX: 31.5 KG/M2 | SYSTOLIC BLOOD PRESSURE: 120 MMHG | DIASTOLIC BLOOD PRESSURE: 88 MMHG | RESPIRATION RATE: 16 BRPM

## 2019-10-15 DIAGNOSIS — F03.90 DEMENTIA WITHOUT BEHAVIORAL DISTURBANCE, UNSPECIFIED DEMENTIA TYPE: ICD-10-CM

## 2019-10-15 DIAGNOSIS — Z00.00 MEDICARE ANNUAL WELLNESS VISIT, SUBSEQUENT: ICD-10-CM

## 2019-10-15 DIAGNOSIS — I10 ESSENTIAL HYPERTENSION: ICD-10-CM

## 2019-10-15 DIAGNOSIS — Z00.00 ROUTINE GENERAL MEDICAL EXAMINATION AT A HEALTH CARE FACILITY: Primary | ICD-10-CM

## 2019-10-15 DIAGNOSIS — E78.5 HYPERLIPIDEMIA, UNSPECIFIED HYPERLIPIDEMIA TYPE: ICD-10-CM

## 2019-10-15 DIAGNOSIS — E11.9 TYPE 2 DIABETES MELLITUS WITHOUT COMPLICATION, WITHOUT LONG-TERM CURRENT USE OF INSULIN (HCC): ICD-10-CM

## 2019-10-15 PROCEDURE — G8417 CALC BMI ABV UP PARAM F/U: HCPCS | Performed by: INTERNAL MEDICINE

## 2019-10-15 PROCEDURE — 99214 OFFICE O/P EST MOD 30 MIN: CPT | Performed by: INTERNAL MEDICINE

## 2019-10-15 PROCEDURE — 1036F TOBACCO NON-USER: CPT | Performed by: INTERNAL MEDICINE

## 2019-10-15 PROCEDURE — 3044F HG A1C LEVEL LT 7.0%: CPT | Performed by: INTERNAL MEDICINE

## 2019-10-15 PROCEDURE — G8427 DOCREV CUR MEDS BY ELIG CLIN: HCPCS | Performed by: INTERNAL MEDICINE

## 2019-10-15 PROCEDURE — G0439 PPPS, SUBSEQ VISIT: HCPCS | Performed by: INTERNAL MEDICINE

## 2019-10-15 PROCEDURE — G8598 ASA/ANTIPLAT THER USED: HCPCS | Performed by: INTERNAL MEDICINE

## 2019-10-15 PROCEDURE — 2024F 7 FLD RTA PHOTO EVC RTNOPTHY: CPT | Performed by: INTERNAL MEDICINE

## 2019-10-15 PROCEDURE — 3017F COLORECTAL CA SCREEN DOC REV: CPT | Performed by: INTERNAL MEDICINE

## 2019-10-15 PROCEDURE — G8484 FLU IMMUNIZE NO ADMIN: HCPCS | Performed by: INTERNAL MEDICINE

## 2019-10-15 ASSESSMENT — LIFESTYLE VARIABLES: HOW OFTEN DO YOU HAVE A DRINK CONTAINING ALCOHOL: 0

## 2019-10-15 ASSESSMENT — ENCOUNTER SYMPTOMS
BACK PAIN: 0
COUGH: 0
NAUSEA: 0
ABDOMINAL PAIN: 0
BLOOD IN STOOL: 0
SHORTNESS OF BREATH: 0
VOMITING: 0
CONSTIPATION: 0
EYE PAIN: 0
DIARRHEA: 0

## 2019-10-15 ASSESSMENT — PATIENT HEALTH QUESTIONNAIRE - PHQ9
SUM OF ALL RESPONSES TO PHQ QUESTIONS 1-9: 0
SUM OF ALL RESPONSES TO PHQ QUESTIONS 1-9: 0

## 2020-01-03 ENCOUNTER — HOSPITAL ENCOUNTER (OUTPATIENT)
Dept: LAB | Age: 65
Discharge: HOME | End: 2020-01-03
Payer: COMMERCIAL

## 2020-01-03 LAB
ESTIMATED AVERAGE GLUCOSE: 128 MG/DL
HBA1C MFR BLD: 6.1 % (ref 4.8–5.9)

## 2020-01-03 PROCEDURE — 83036 HEMOGLOBIN GLYCOSYLATED A1C: CPT

## 2020-01-03 PROCEDURE — 36415 COLL VENOUS BLD VENIPUNCTURE: CPT

## 2020-03-11 RX ORDER — DONEPEZIL HYDROCHLORIDE 10 MG/1
TABLET, FILM COATED ORAL
Qty: 90 TABLET | Refills: 1 | Status: SHIPPED | OUTPATIENT
Start: 2020-03-11 | End: 2020-09-08

## 2020-03-17 ENCOUNTER — OFFICE VISIT (OUTPATIENT)
Dept: NEUROLOGY | Age: 65
End: 2020-03-17
Payer: MEDICARE

## 2020-03-17 VITALS
SYSTOLIC BLOOD PRESSURE: 130 MMHG | RESPIRATION RATE: 16 BRPM | DIASTOLIC BLOOD PRESSURE: 76 MMHG | BODY MASS INDEX: 30.78 KG/M2 | HEIGHT: 70 IN | HEART RATE: 90 BPM | WEIGHT: 215 LBS

## 2020-03-17 PROCEDURE — 99215 OFFICE O/P EST HI 40 MIN: CPT | Performed by: PSYCHIATRY & NEUROLOGY

## 2020-03-17 PROCEDURE — 3017F COLORECTAL CA SCREEN DOC REV: CPT | Performed by: PSYCHIATRY & NEUROLOGY

## 2020-03-17 PROCEDURE — 2022F DILAT RTA XM EVC RTNOPTHY: CPT | Performed by: PSYCHIATRY & NEUROLOGY

## 2020-03-17 PROCEDURE — G8484 FLU IMMUNIZE NO ADMIN: HCPCS | Performed by: PSYCHIATRY & NEUROLOGY

## 2020-03-17 PROCEDURE — 1036F TOBACCO NON-USER: CPT | Performed by: PSYCHIATRY & NEUROLOGY

## 2020-03-17 PROCEDURE — G8427 DOCREV CUR MEDS BY ELIG CLIN: HCPCS | Performed by: PSYCHIATRY & NEUROLOGY

## 2020-03-17 PROCEDURE — G8417 CALC BMI ABV UP PARAM F/U: HCPCS | Performed by: PSYCHIATRY & NEUROLOGY

## 2020-03-17 PROCEDURE — 3044F HG A1C LEVEL LT 7.0%: CPT | Performed by: PSYCHIATRY & NEUROLOGY

## 2020-03-17 ASSESSMENT — ENCOUNTER SYMPTOMS
CHOKING: 0
PHOTOPHOBIA: 0
BACK PAIN: 1
SWOLLEN GLANDS: 0
SORE THROAT: 0
EYE PAIN: 0
CHANGE IN BOWEL HABIT: 0
VOMITING: 0
SINUS PRESSURE: 0
CHEST TIGHTNESS: 0
ABDOMINAL DISTENTION: 0
VOICE CHANGE: 0
FACIAL SWELLING: 0
SHORTNESS OF BREATH: 0
BOWEL INCONTINENCE: 0
CONSTIPATION: 0
TROUBLE SWALLOWING: 0
VISUAL CHANGE: 1
COUGH: 0
EYE DISCHARGE: 0
EYE REDNESS: 0
COLOR CHANGE: 0
NAUSEA: 0
DIARRHEA: 0
APNEA: 0
WHEEZING: 0
BLOOD IN STOOL: 0
EYE ITCHING: 0
ABDOMINAL PAIN: 0

## 2020-03-17 NOTE — PROGRESS NOTES
[facility]  Neurology  [address] [place] [address]  SBITZ:[phone]   Fax: [phone]    SUBJECTIVE:     PATIENT ID:  Prateek Sierra is a   LEFT     HANDED 60 y.o. male. Cerebrovascular Accident   This is a chronic problem. Episode onset: SEPT. 2016   The problem has been unchanged. Associated symptoms include numbness and a visual change. Pertinent negatives include no abdominal pain, anorexia, arthralgias, change in bowel habit, chest pain, chills, congestion, coughing, diaphoresis, fatigue, fever, headaches, joint swelling, myalgias, nausea, neck pain, rash, sore throat, swollen glands, urinary symptoms, vertigo, vomiting or weakness. Nothing aggravates the symptoms. Treatments tried: ASA   AND PLAVIX   The treatment provided moderate relief. Neurologic Problem   The patient's primary symptoms include clumsiness, focal sensory loss, a loss of balance, memory loss and a visual change. The patient's pertinent negatives include no altered mental status, focal weakness, near-syncope, slurred speech, syncope or weakness. This is a chronic problem. Episode onset: MORE  THAN   2-3  YEARS. The neurological problem developed insidiously. There was lower extremity, left-sided and right-sided focality noted. Associated symptoms include back pain. Pertinent negatives include no abdominal pain, auditory change, aura, bladder incontinence, bowel incontinence, chest pain, confusion, diaphoresis, dizziness, fatigue, fever, headaches, light-headedness, nausea, neck pain, palpitations, shortness of breath, vertigo or vomiting. Past treatments include medication, bed rest and sleep. The treatment provided mild relief. His past medical history is significant for a CVA and dementia. There is no history of a bleeding disorder, a clotting disorder, head trauma, liver disease, mood changes or seizures.            History obtained from  The patient     And   HIS  WIFE       and other available   medical  records   were  Also  reviewed. The  Duration,  Quality,  Severity,  Location,  Timing,  Context,  Modifying  Factors   Of   The   Chief   Complaint   And  Present  Illness       Was   Reviewed   In   Chronological   Manner. PATIENT'S  MAIN  CONCERNS INCLUDE :                     1)     H/O    STROKE  IN     2016           WITH                                       HOSPITALIZATION  IN  [place]                             2)    PATIENT  WAS    REPORTED  TO    UNREPONSIVE                                       DUE  TO  CARDIAC    PROBLEMS                                 3 )    MRI  BRAIN   IN    SEPT. 2016     SHOWED   :                               A)   BILATERAL  TEMPORAL  INFARCTS                                 B)   OLD  LEFT  OCCIPITAL INFARCT                               4)      H/O   POST  STROKE     MEMORY  AND  COGNITIVE  PROBLEMS                                              SINCE     2016                                    -  ON  ARICEPT    5  MG  DAILY                             5)      RESIDUAL   RIGHT     VISUAL   FIELD  DEFECTS                                              DUE  TO  PREVIOUS  STROKE                               6)     CURRENTLY  ON   ASA  AND PLAVIX                                         PROPHYLACTIC ALLY           AND                                    TOLERATING  THE  SAME.                                7)     MULTIPLE  CO  MORBID  MEDICAL  CONDITIONS                                 BEING  FOLLOWED  BY  HIS  PCP                                8)     DIABETIC  PERIPHERAL  POLYNEUROPATHY                                               MOSTLY   BOTH  LOWER  EXTREMITIES                                 9)   H/O   CHRONIC  LUMBAR  PAIN                            PREVIOUS  H/O   LUMBAR  DISC   SURGERIES                                    10)     MILD    BALANCE PROBLEMS                                              -     STABLE MODIFYING  FACTORS:  fever/infection, exertion/relaxation, position change, stress, weather change,                        medications/alcohol, time of day/darkness/light  Are  absent             Patient   Indicates   The  Presence   And  The  Absence  Of  The  Following    Associated  And   Additional  Neurological    Symptoms:                                Balance  And coordination   problems  present           Gait problems     absent            Headaches      absent              Migraines           absent           Memory problemspresent              Confusion        absent            Paresthesia   numbness          present           Seizures  And  Starring  Episodes           absent           Syncope,  Near  syncopal episodes         absent           Speech   problems           absent             Swallowing   Problems      absent            Dizziness,  Light headedness           absent              Vertigo        absent             Generalized   Weakness    absent              focal  Weakness     absent             Tremors         absent              Sleep  Problems     absent             History  Of   Recent  Head  Injury     absent             History  Of   Recent  TIA     absent             History  Of   Recent    Stroke     absent             Neck  Pain   and   Neck muscle  Spasms  absent               Radiating  down   And   Weakness           absent            Lower back   Pain  And     Spasms  present              Radiating    Down   And   Weakness          present                H/O    FALLS        absent               History  Of   Visual  Symptoms   PRESENT                    Associated   Diplopia       absent                                               Also   Additional   Symptoms   Present    As  Documented    In   The   detailed                  Review  Of  Systems   And    Please   Refer   To    Them for   Additional    Information.                   Any components  That are either  Unobtainable Or  Limited  In   HPI, ROS  And/or PFSH   Are                   Due   ToPatient's  Medical  Problems,  Clinical  Condition   and/or lack of                                other    Alternate   resources.           RECORDS   REVIEWED:    historical medical records       INFORMATION   REVIEWED:     MEDICAL   HISTORY,SURGICAL   HISTORY,     MEDICATIONS   LIST,   ALLERGIES AND  DRUG  INTOLERANCES,       FAMILY   HISTORY,  SOCIAL  HISTORY,      PROBLEM  LIST   FOR  PATIENT  CARE   COORDINATION      Past Medical History:   Diagnosis Date    [facility]     Diabetes mellitus ([facility]     History of back surgery     Measles     Osteoarthritis          Past Surgical History:   Procedure Laterality Date    BACK SURGERY      HERNIA REPAIR           Current Outpatient Medications   Medication Sig Dispense Refill    donepezil (ARICEPT) 10 MG tablet TAKE 1 TABLET DAILY WITH SUPPER 90 tablet 1    amLODIPine (NORVASC) 10 MG tablet Take 1 tablet by mouth daily 90 tablet 3    atorvastatin (LIPITOR) 40 MG tablet Take 1 tablet by mouth daily 90 tablet 3    clopidogrel (PLAVIX) 75 MG tablet Take 1 tablet by mouth daily 90 tablet 3    cyclobenzaprine (FLEXERIL) 10 MG tablet Take 1 tablet by mouth 3 times daily 270 tablet 3    hydrALAZINE (APRESOLINE) 25 MG tablet Take 1 tablet by mouth 3 times daily 270 tablet 3    metFORMIN (GLUCOPHAGE) 500 MG tablet Take 1 tablet by mouth 2 times daily 180 tablet 3    lisinopril (PRINIVIL;ZESTRIL) 20 MG tablet Take 1 tablet by mouth daily 90 tablet 3    aspirin 81 MG tablet Take 81 mg by mouth daily      vitamin B-12 (CYANOCOBALAMIN) 1000 MCG tablet Take 1,000 mcg by mouth daily      Cholecalciferol (VITAMIN D3) 5000 units TABS Take 5,000 Units by mouth daily      vitamin E 400 UNIT capsule Take 400 Units by mouth daily      magnesium oxide (MAG-OX) 400 MG tablet Take 400 mg by mouth daily       Current Facility-Administered Medications   Medication Dose Route 03/17/20 1602   BP: 130/76   Pulse: 90   Resp: 16         Wt Readings from Last 3 Encounters:   03/17/20 215 lb (97.5 kg)   10/15/19 220 lb (99.8 kg)   09/13/19 216 lb 11.4 oz (98.3 kg)         BP Readings from Last 3 Encounters:   03/17/20 130/76   10/15/19 120/88   09/13/19 132/74       Chemistries  Lab Results   Component Value Date     07/02/2019    K 4.3 07/02/2019     07/02/2019    CO2 26 07/02/2019    BUN 14 07/02/2019    CREATININE 1.01 07/02/2019    CALCIUM 9.7 07/02/2019    PROT 7.0 07/02/2019    LABALBU 4.5 07/02/2019    BILITOT 0.47 07/02/2019    ALKPHOS 67 07/02/2019    AST 13 07/02/2019    ALT 19 07/02/2019     Lab Results   Component Value Date    ALKPHOS 67 07/02/2019    ALT 19 07/02/2019    AST 13 07/02/2019    PROT 7.0 07/02/2019    BILITOT 0.47 07/02/2019    LABALBU 4.5 07/02/2019     Lab Results   Component Value Date    BUN 14 07/02/2019    CREATININE 1.01 07/02/2019     Lab Results   Component Value Date    CALCIUM 9.7 07/02/2019    MG 1.9 07/02/2019     Lab Results   Component Value Date    AST 13 07/02/2019    ALT 19 07/02/2019     Lab Results   Component Value Date    [id number] 042 07/08/2019           Review of Systems   Constitutional: Negative for appetite change, chills, diaphoresis, fatigue, fever and unexpected weight change. HENT: Negative for congestion, dental problem, drooling, ear discharge, ear pain, facial swelling, hearing loss, mouth sores, nosebleeds, postnasal drip, sinus pressure, sore throat, tinnitus, trouble swallowing and voice change. Eyes: Positive for visual disturbance. Negative for photophobia, pain, discharge, redness and itching. Respiratory: Negative for apnea, cough, choking, chest tightness, shortness of breath and wheezing. Cardiovascular: Negative for chest pain, palpitations, leg swelling and near-syncope.    Gastrointestinal: Negative for abdominal distention, abdominal pain, anorexia, blood in stool, bowel incontinence, change in bowel habit, constipation, diarrhea, nausea and vomiting. Endocrine: Negative for cold intolerance, heat intolerance, polydipsia, polyphagia and polyuria. Genitourinary: Negative for bladder incontinence. Musculoskeletal: Positive for back pain. Negative for arthralgias, gait problem, joint swelling, myalgias, neck pain and neck stiffness. Skin: Negative for color change, pallor, rash and wound. Allergic/Immunologic: Negative for environmental allergies, food allergies and immunocompromised state. Neurological: Positive for numbness and loss of balance. Negative for dizziness, vertigo, tremors, focal weakness, seizures, syncope, facial asymmetry, speech difficulty, weakness, light-headedness and headaches. Hematological: Negative for adenopathy. Does not bruise/bleed easily. Psychiatric/Behavioral: Positive for decreased concentration and memory loss. Negative for agitation, behavioral problems, confusion, dysphoric mood, hallucinations, self-injury, sleep disturbance and suicidal ideas. The patient is nervous/anxious. The patient is not hyperactive. OBJECTIVE:    Physical Exam  Constitutional:       Appearance: He is well-developed. HENT:      Head: Normocephalic and atraumatic. No raccoon eyes or Adame's sign. Right Ear: External ear normal.      Left Ear: External ear normal.      Nose: Nose normal.   Eyes:      Conjunctiva/sclera: Conjunctivae normal.   Neck:      Musculoskeletal: Normal range of motion and neck supple. Normal range of motion. No neck rigidity or muscular tenderness. Thyroid: No thyroid mass or thyromegaly. Vascular: No carotid bruit. Trachea: No tracheal deviation. Meningeal: Brudzinski's sign and Kernig's sign absent. Cardiovascular:      Rate and Rhythm: Normal rate and regular rhythm. Pulmonary:      Effort: Pulmonary effort is normal.   Musculoskeletal:         General: No tenderness. Skin:     General: Skin is warm. cerebral infarction ([address]    Infarction of temporal lobe ([address]             CT OF THE HEAD WITHOUT CONTRAST  7/12/2019 4:17 pm       TECHNIQUE:   CT of the head was performed without the administration of intravenous   contrast. Dose modulation, iterative reconstruction, and/or weight based   adjustment of the mA/kV was utilized to reduce the radiation dose to as low   as reasonably achievable.       COMPARISON:   None.       HISTORY:   ORDERING SYSTEM PROVIDED HISTORY: Right homonymous hemianopsia   TECHNOLOGIST PROVIDED HISTORY:   history of CVA   Reason for Exam: Balance and memory loss with vision changes.  History of CVA   1 year ago   Acuity: Chronic   Type of Exam: Initial       FINDINGS:   BRAIN/VENTRICLES: There is no acute intracranial hemorrhage, mass effect or   midline shift.  No abnormal extra-axial fluid collection.  The gray-white   differentiation is maintained without evidence of an acute infarct.  There is   no evidence of hydrocephalus.  Old ischemic strokes with resultant   encephalomalacia in the left occipital lobe and posterior aspect of right   temporal lobe.       ORBITS: The visualized portion of the orbits demonstrate no acute abnormality.       SINUSES: The visualized paranasal sinuses and mastoid air cells demonstrate   no acute abnormality.       SOFT TISSUES/SKULL:  No acute abnormality of the visualized skull or soft   tissues.  Small sebaceous cyst with calcifications in the subcutaneous fat of   the posterior parietal scalp along the midline.           Impression   No acute intracranial abnormality.       Old ischemic strokes with resultant encephalomalacia in the left occipital   lobe and posterior aspect of right temporal lobe.             History:  Stroke    Exam/Technique:  Multiplanar multisequence images were obtained without the use of contrast.    Comparison:  Cerebral MRI from September 29, 2016    Camille Grewal is no evidence of recent infarcts on diffusion-weighted ICA/CCA ratio of 1.1.           LEFT:       The left common carotid artery demonstrates peak systolic velocities of 67,   70 cm/sec in the proximal and distal segments respectively.       The left internal carotid artery demonstrates the systolic velocities of 43,   50, 50 cm/sec in the proximal, mid and distal segments respectively.       The external carotid artery is patent.  The vertebral artery demonstrates   normal antegrade flow.       A focus of mild smooth plaque is noted at the origin of the internal carotid   artery. ICA/CCA ratio of 0.7.           Impression   The right internal carotid artery demonstrates 0-50% stenosis.       The left internal carotid artery demonstrates 0-50% stenosis.       Bilateral vertebral arteries are patent with flow in the normal direction.             VISITING DIAGNOSIS:      ICD-10-CM    1. Occipital cerebral infarction (HCC) I63.9    2. Infarction of temporal lobe (HCC) I63.89    3. [facility]vascular accident ([facility] I63.9    4. History of back surgery Z98.890    5. Gastroesophageal reflux disease without esophagitis K21.9    6. Type 2 diabetes mellitus with other neurologic complication, without long-term current use of insulin ([facility]) E11.49    7. Right homonymous hemianopsia H53.461    8. Hyperlipidemia, unspecified hyperlipidemia type E78.5    9. Memory loss R41.3    10. Low back pain, unspecified back pain laterality, unspecified chronicity, unspecified whether sciatica present M54.5    11. Essential hypertension I10    12. Hypertensive retinopathy of both eyes H35.033    13. Combined forms of age-related cataract of both eyes H25.813    14.  Type 2 diabetes mellitus without complication, without long-term current use of insulin ([facility]) E11.9               CONCERNS   &   INCREASED   RISK   FOR         * TIA,  CEREBRO  VASCULAR  ISCHEMIA,STROKE      *   COGNITIVE  &   MEMORY PROBLEMS  AND  DEMENTIAS         *   PERIPHERAL  NEUROPATHY,        * BALANCE PROBLMES                 VARIOUS  RISK   FACTORS   WERE  REVIEWED   AND   DISCUSSED. *  PATIENT   HAS  MULTIPLE   MEDICAL, NEUROLOGICAL      PROBLEMS         PATIENT'S   MANAGEMENT  IS  CHALLENGING. PLAN:                         Brian Parry  Of  The  Diagnoses,  The  Management & Treatment  Options            AND    Care  plan  Were          Reviewed and   Discussed   With  patient. * Goals  And  Expectations  Of  The  Therapy  Discussed   And  Reviewed. *   Benefits   And   Side  Effect  Profile  Of  Medication/s   Were   Discussed                * Need   For  Further   Follow up For  The  Various  Problems Were  discussed. * Results  Of  The  Previous  Diagnostic tests were reviewed and  discussed                 Medical  Decision  Making  Was  Made  Based on the   Complexity  Of  Above  Mentioned  Diagnoses,    Data reviewed             And    Risk  Of  Significant   Co morbidities and   complicating   Factors. Medical  Decision  Was   High    Complexity   Due   To  The  Patient's  Multiple  Symptoms,  Advancing   Disease,  Complex  Treatment  Regimen,  Multiple medications           and   Risk  Of   Side  Effects,  Difficulty  In  Medication  Management  And  Diagnostic  Challenges   In  View  Of  The  Associated   Co  Morbid  Conditions   And  Problems. * FALL   PRECAUTIONS. THESE  REVIEWED   AND  DISCUSSED      *   ABSOLUTELY   NO  DRIVING        *   BE  CAREFUL  WITH  ACTIVITIES            *   AVOID   BACK  STRAINING  ACTIVITIES          *   ADEQUATE   FLUIDINTAKE   AND  ELECTROLYTE  BALANCE         * KEEP  DAIRY  OF   THE  NEUROLOGICAL  SYMPTOMS        RECORDING THE    DURATION  AND  FREQUENCY. *  AVOID    CONDITIONS  AND  FACTORS   THAT  MAKE                  NEUROLOGICAL  SYMPTOMS  WORSE.           *TO  MAINTAIN  REGULAR  SLEEP  WAKE  CYCLES.      *   TO  HAVE  ADEQUATE  REST  AND   SLEEP HOURS.          *    AVOID  ANY USAGE OF    TOBACCO,              EXCESSIVE  ALCOHOL  AND   ILLEGAL   SUBSTANCES          *  CONTINUE   MEDICATIONS    PRESCRIBED   BY NEUROLOGIST    AS    RECOMMENDED       *   Compliance   With  Medications   And  Instructions          * CURRENTLY    TOLERATING  THE  PRESCRIBED   MEDICATIONS. WITHOUT  ANY  SIGNIFICANT  SIDE  EFFECTS   &  GETTING BENEFIT. *    Antiplatelet  therapy    As   Recommended  Was   Discussed          *    Prophylactic  Use   Of     Vitamin   B   Complex,  Folic  Acid,    Vitamin  B12    Multivitamin,       Calcium  With  magnesium  And  Vit D    Supplementations   Over  The  Counter  Discussed         *FOOT  CARE, DAILY  INSPECTION  OF  FEET   AND         PERIODIC  PODIATRY EVALUATIONS . *  PATIENT  IS  ALSO   COUNSELED   TO  KEEP    ACTIVITIES:         A)   SIMPLE      B)  ORGANIZED      C)  WRITEDOWN                   *  EVALUATIONS  AND  FOLLOW UP:                                      *CARDIOLOGY              *   OPTHALMOLOGY                                 *          AS   DISCUSSED    WITH  PATIENT   AND  HIS  WIFE                                PATIENT     STARTED    ON       ARICEPT    10  MG  PO   LAMB   WITH  SUPPER                                       IN   SEPT. 2019                                     PATIENT  TOLERATING  THE  SAME  AND  GETTING  BENEFIT. *              EXPECTATIONS,   GOALS   AND  SIDE  EFFECTS  MEDICATION    WERE                                 REVIEWED     AND   DISCUSSED    IN    DETAIL. *PATIENT   TO  FOLLOW  UP  WITH   PRIMARY  CARE         OTHER  CONSULTANTS  AS  BEFORE. *  Maintain   Healthy  Life Style    With   Periodic  Monitoring  Of      Any  Medical  Conditions  Including   Elevated  Blood  Pressure,  Lipid  Profile,     Blood  Sugar levels  AndHeart  Disease.                 *   Period   Screening  For  Cancers  Involving transcription may have occurred. GENERAL PATIENT INSTRUCTIONS:      A Healthy Lifestyle: Care Instructions   Your Care Instructions   A healthy lifestyle can help you feel good, stay at ahealthy weight, and have plenty of energy for both work and play. A healthy lifestyle is something you can share with your whole family.  A healthy lifestyle also can lower your risk for serious health problems, such ashigh blood pressure, heart disease, and diabetes.  You can follow a few steps listed below to improve your health and the health of your family.  Follow-up careis a key part of your treatment and safety. Be sure to make and go to all appointments, and call your doctor if you are having problems. Its also a good idea to know your test results and keep a list of the medicines you take.  How can you care for yourself at home?  Do not eat too much sugar, fat, or fast foods. You can still have dessert and treats nowand then. The goal is moderation.  Start small to improve your eating habits. Pay attention to portion sizes, drink less juice and soda pop, and eat more fruits and vegetables.  Eat a healthy amount of food. A 3-ounce serving of meat, for example, is about the size of a deck of cards. Fill the rest of your plate with vegetables and whole grains.  Limit theamount of soda and sports drinks you have every day. Drink more water when you are thirsty.  Eat at least 5 servings of fruits and vegetables every day. It may seem like a lot, but it is not hard to reach this goal. Aserving or helping is 1 piece of fruit, 1 cup of vegetables, or 2 cups of leafy, raw vegetables. Have an apple or some carrot sticks as an afternoon snack instead of a candy bar. Try to have fruits and/or vegetables at everymeal.   Make exercise part of your daily routine. You may want to start with simple activities, such as walking, bicycling, or slow swimming. Try csear active 30 to 60 minutes every day.  You do not need to do all 30 to 60 minutes all at once. For example, you can exercise 3 times a day for 10 or 20 minutes. Moderate exercise is safe for most people, but it is always agood idea to talk to your doctor before starting an exercise program.   Keep moving. [place] the lawn, work in the garden, or zkipster. Take the stairs instead of the elevator at work.  If you smoke, quit. Peoplewho smoke have an increased risk for heart attack, stroke, cancer, and other lung illnesses. Quitting is hard, but there are ways to boost your chance of quitting tobacco for good.  Use nicotine gum, patches, or lozenges.  Ask your doctor about stop-smoking programs and medicines.  Keep trying.  In addition to reducing your risk of diseases in the future, you will notice some benefits soon after you stop using tobacco. If you have shortness of breath or asthma symptoms, they will likely getbetter within a few weeks after you quit.  Limit how much alcohol you drink. Moderate amounts of alcohol (up to 2 drinks a day for men, 1drink a day for women) are okay. But drinking too much can lead to liver problems, high blood pressure, and other health problems.  health   If you have a family, there are many things you can do together to improve your health.  Eat meals together as a family as often as possible.  Eat healthy foods. This includes fruits, vegetables, lean meats and dairy, and whole grains.  Include your family in your fitness plan. Most peoplethink of activities such as jogging or tennis as the way to fitness, but there are many ways you and your family can be more active. Anything that makes you breathe hard and gets your heart pumping is exercise. Here are sometips:   Walk to do errands or to take your child to school or the bus.  Go for a family bike ride after dinner instead of watching TV.  Where can you learn more?  Go totps://akne.[email or web address] org and sign in to your [employer] account. Enter S170 in the Search HealthInformation box to learn more about \"A Healthy Lifestyle: Care Instructions. \"     If you do not have anaccount, please click on the \"Sign Up Now\" link.  Current as of: July 26, 2016   Content Version: 11.2   © 6061-5841 [employer]. Care instructions adapted under license by [facility] ([facility]). If you have questions about a medical condition or this instruction, always ask your healthcare professional. [employer] disclaims any warranty or liability for your use of this information.

## 2020-03-17 NOTE — PATIENT INSTRUCTIONS
* FALL   PRECAUTIONS. *  USE   WALKING  ASSISTANCE  DEVICES     QUAD  CANE         *   ADEQUATE   FLUID  INTAKE   AND  ELECTROLYTE  BALANCE           * KEEP  DAIRY  OF   THE  NEUROLOGICAL  SYMPTOMS          *  TO  MAINTAIN  REGULAR  SLEEP  WAKE  CYCLES. *   TO  HAVE  ADEQUATE  REST  AND   SLEEP    HOURS.        *    AVOID  USAGE OF   TOBACCO,  EXCESSIVE  ALCOHOL                AND   ILLEGAL   SUBSTANCES,  IF  ANY          *  Maintain   Healthy  Life Style    With   Periodic  Monitoring  Of      Any  Medical  Conditions  Including   Elevated  Blood  Pressure,  Lipid  Profile,     Blood  Sugar levels  And   Heart  Disease. *   Period   Screening  For  Cancers  Involving  Breast,  Colon,   lungs  And  Other  Organs  As  Applicable,  In consultation   With  Your  Primary Care Providers. *  If   There is  Any  Significant  Worsening   Of  Current  Symptoms  And  Or  If    Any additional  New  Neurological  Symptoms                 Or  Significant  Concerns   Should  Call  911 or  Go  To  Emergency  Department  For  Further  Immediate  Evaluation.

## 2020-06-01 RX ORDER — CYCLOBENZAPRINE HCL 10 MG
TABLET ORAL
Qty: 270 TABLET | Refills: 3 | Status: SHIPPED | OUTPATIENT
Start: 2020-06-01 | End: 2021-05-26

## 2020-06-09 RX ORDER — AMLODIPINE BESYLATE 10 MG/1
TABLET ORAL
Qty: 90 TABLET | Refills: 3 | Status: SHIPPED | OUTPATIENT
Start: 2020-06-09 | End: 2021-06-07

## 2020-06-09 RX ORDER — ATORVASTATIN CALCIUM 40 MG/1
TABLET, FILM COATED ORAL
Qty: 90 TABLET | Refills: 3 | Status: SHIPPED | OUTPATIENT
Start: 2020-06-09 | End: 2021-06-07

## 2020-06-09 RX ORDER — CLOPIDOGREL BISULFATE 75 MG/1
TABLET ORAL
Qty: 90 TABLET | Refills: 3 | Status: SHIPPED | OUTPATIENT
Start: 2020-06-09 | End: 2021-06-07

## 2020-06-09 RX ORDER — LISINOPRIL 20 MG/1
TABLET ORAL
Qty: 90 TABLET | Refills: 3 | Status: SHIPPED | OUTPATIENT
Start: 2020-06-09 | End: 2021-06-07

## 2020-06-09 RX ORDER — HYDRALAZINE HYDROCHLORIDE 25 MG/1
TABLET, FILM COATED ORAL
Qty: 270 TABLET | Refills: 3 | Status: SHIPPED | OUTPATIENT
Start: 2020-06-09 | End: 2021-04-19

## 2020-06-12 ENCOUNTER — OFFICE VISIT (OUTPATIENT)
Dept: INTERNAL MEDICINE | Age: 65
End: 2020-06-12
Payer: COMMERCIAL

## 2020-06-12 ENCOUNTER — HOSPITAL ENCOUNTER (OUTPATIENT)
Dept: LAB | Age: 65
Discharge: HOME | End: 2020-06-12
Payer: COMMERCIAL

## 2020-06-12 VITALS
RESPIRATION RATE: 16 BRPM | WEIGHT: 217 LBS | TEMPERATURE: 97.7 F | BODY MASS INDEX: 31.07 KG/M2 | DIASTOLIC BLOOD PRESSURE: 88 MMHG | HEIGHT: 70 IN | HEART RATE: 100 BPM | SYSTOLIC BLOOD PRESSURE: 138 MMHG

## 2020-06-12 LAB
ESTIMATED AVERAGE GLUCOSE: 131 MG/DL
HBA1C MFR BLD: 6.2 % (ref 4.8–5.9)

## 2020-06-12 PROCEDURE — 3044F HG A1C LEVEL LT 7.0%: CPT | Performed by: INTERNAL MEDICINE

## 2020-06-12 PROCEDURE — 3017F COLORECTAL CA SCREEN DOC REV: CPT | Performed by: INTERNAL MEDICINE

## 2020-06-12 PROCEDURE — G8427 DOCREV CUR MEDS BY ELIG CLIN: HCPCS | Performed by: INTERNAL MEDICINE

## 2020-06-12 PROCEDURE — 1036F TOBACCO NON-USER: CPT | Performed by: INTERNAL MEDICINE

## 2020-06-12 PROCEDURE — G8417 CALC BMI ABV UP PARAM F/U: HCPCS | Performed by: INTERNAL MEDICINE

## 2020-06-12 PROCEDURE — 2022F DILAT RTA XM EVC RTNOPTHY: CPT | Performed by: INTERNAL MEDICINE

## 2020-06-12 PROCEDURE — 83036 HEMOGLOBIN GLYCOSYLATED A1C: CPT

## 2020-06-12 PROCEDURE — 99214 OFFICE O/P EST MOD 30 MIN: CPT | Performed by: INTERNAL MEDICINE

## 2020-06-12 PROCEDURE — 36415 COLL VENOUS BLD VENIPUNCTURE: CPT

## 2020-06-12 ASSESSMENT — PATIENT HEALTH QUESTIONNAIRE - PHQ9
SUM OF ALL RESPONSES TO PHQ QUESTIONS 1-9: 0
SUM OF ALL RESPONSES TO PHQ QUESTIONS 1-9: 0
SUM OF ALL RESPONSES TO PHQ9 QUESTIONS 1 & 2: 0
2. FEELING DOWN, DEPRESSED OR HOPELESS: 0
1. LITTLE INTEREST OR PLEASURE IN DOING THINGS: 0

## 2020-06-12 ASSESSMENT — ENCOUNTER SYMPTOMS
EYE PAIN: 0
DIARRHEA: 0
COUGH: 0
CONSTIPATION: 0
SHORTNESS OF BREATH: 0
VOMITING: 0
BACK PAIN: 0
BLOOD IN STOOL: 0
ABDOMINAL PAIN: 0
NAUSEA: 0

## 2020-06-12 NOTE — PROGRESS NOTES
[facility]  [address] [place] [address]  Dept: [phone]  Dept Fax: [phone]  Loc: [phone]     Al Banerjee is a 60 y.o. male who presents today for his medical conditions/complaintsas noted below. Al Banerjee is c/o of   Chief Complaint   Patient presents with    Hypertension     6 month appt    Diabetes    Hyperlipidemia         HPI:     Hypertension   This is a chronic (essential htn) problem. The current episode started more than 1 year ago. The problem has been waxing and waning since onset. The problem is controlled. Pertinent negatives include no chest pain, headaches, neck pain, palpitations or shortness of breath. Diabetes   He presents for his follow-up diabetic visit. He has type 2 diabetes mellitus. His disease course has been fluctuating. Pertinent negatives for hypoglycemia include no confusion, dizziness, headaches, nervousness/anxiousness or pallor. Pertinent negatives for diabetes include no chest pain, no polydipsia, no polyuria and no weakness. Hyperlipidemia   This is a chronic problem. The current episode started more than 1 year ago. The problem is controlled. Recent lipid tests were reviewed and are variable. Pertinent negatives include no chest pain or shortness of breath. Hemoglobin A1C (%)   Date Value   01/03/2020 6.1 (H)   07/02/2019 5.8   05/17/2018 6.1            Microalb/Crt.  Ratio (mcg/mg creat)   Date Value   07/02/2019 7     LDL Cholesterol (mg/dL)   Date Value   07/02/2019 51     LDL Calculated (mg/dL)   Date Value   11/02/2017 50         AST (U/L)   Date Value   07/02/2019 13     ALT (U/L)   Date Value   07/02/2019 19     BUN (mg/dL)   Date Value   07/02/2019 14     BP Readings from Last 3 Encounters:   06/12/20 138/88   03/17/20 130/76   10/15/19 120/88              Past Medical History:   Diagnosis Date    CVA (cerebral vascular accident) ([facility] [address] ophthalmic solution 1 drop  1 drop Both Eyes Once Jose Juan Morgan MD         No Known Allergies    Health Maintenance   Topic Date Due    Colon cancer screen colonoscopy  06/14/2007    Diabetic foot exam  06/13/2020    Diabetic retinal exam  06/26/2020    DTaP/Tdap/Td vaccine (1 - Tdap) 06/13/2020 (Originally 6/14/1976)    Shingles Vaccine (1 of 2) 06/13/2020 (Originally 6/14/2007)    Pneumococcal 0-64 years Vaccine (1 of 1 - PPSV23) 06/13/2020 (Originally 6/14/1963)    Hepatitis C screen  06/13/2020 (Originally 1955)    HIV screen  06/13/2020 (Originally 6/14/1972)    Flu vaccine (Season Ended) 10/15/2020 (Originally 9/1/2020)    Low dose CT lung screening  06/25/2020    Diabetic microalbuminuria test  07/02/2020    Lipid screen  07/02/2020    Potassium monitoring  07/02/2020    Creatinine monitoring  07/02/2020    Annual Wellness Visit (AWV)  10/15/2020    A1C test (Diabetic or Prediabetic)  01/03/2021    Hepatitis A vaccine  Aged Out    Hib vaccine  Aged Out    Meningococcal (ACWY) vaccine  Aged Out       Subjective:      Review of Systems   Constitutional: Negative for chills and fever. HENT: Negative for hearing loss. Eyes: Negative for pain and visual disturbance. Respiratory: Negative for cough and shortness of breath. Cardiovascular: Negative for chest pain, palpitations and leg swelling. Gastrointestinal: Negative for abdominal pain, blood in stool, constipation, diarrhea, nausea and vomiting. Endocrine: Negative for cold intolerance, polydipsia and polyuria. Genitourinary: Negative for difficulty urinating, dysuria and hematuria. Musculoskeletal: Negative for arthralgias, back pain, gait problem and neck pain. Skin: Negative for pallor and rash. Neurological: Negative for dizziness, weakness, numbness and headaches. Hematological: Negative for adenopathy. Does not bruise/bleed easily. Psychiatric/Behavioral: Negative for confusion.  The patient is not nervous/anxious. Objective:     Physical Exam  Vitals signs reviewed. Constitutional:       Appearance: He is well-developed. HENT:      Head: Normocephalic and atraumatic. Eyes:      Pupils: Pupils are equal, round, and reactive to light. Neck:      Musculoskeletal: Neck supple. Cardiovascular:      Rate and Rhythm: Normal rate and regular rhythm. Heart sounds: No murmur. No friction rub. No gallop. Pulmonary:      Effort: Pulmonary effort is normal.      Breath sounds: Normal breath sounds. No wheezing or rales. Abdominal:      General: There is no distension. Palpations: Abdomen is soft. There is no mass. Tenderness: There is no abdominal tenderness. There is no rebound. Musculoskeletal: Normal range of motion. Lymphadenopathy:      Cervical: No cervical adenopathy. Skin:     General: Skin is warm and dry. Findings: No rash. Neurological:      Mental Status: He is alert and oriented to person, place, and time. Cranial Nerves: No cranial nerve deficit (grossly). Psychiatric:         Thought Content: Thought content normal.        /88 (Site: Left Upper Arm, Position: Sitting, Cuff Size: Medium Adult)   Pulse 100   Temp 97.7 °F (36.5 °C)   Resp 16   Ht 5' 9.5\" (1.765 m)   Wt 217 lb (98.4 kg)   BMI 31.59 kg/m²     Assessment:       Diagnosis Orders   1. Essential hypertension  Comprehensive Metabolic Panel   2. Type 2 diabetes mellitus without complication, without long-term current use of insulin (HCC)  Microalbumin, Ur    Hemoglobin A1C    HM DIABETES FOOT EXAM   3. Other hyperlipidemia  Lipid Panel   4. Hypomagnesemia  Magnesium   5. Vitamin B12 deficiency  Vitamin B12 & Folate   6. Vitamin D deficiency  Vitamin D 25 Hydroxy             Plan:       Return in about 6 months (around 12/12/2020) for medicare AWV, Hypertension, Hyperlipidemia, Diabetes.     Orders Placed This Encounter   Procedures    Magnesium     Standing Status:   Future Standing Expiration Date:   6/12/2021    Vitamin B12 & Folate     Standing Status:   Future     Standing Expiration Date:   6/12/2021    Vitamin D 25 Hydroxy     Standing Status:   Future     Standing Expiration Date:   6/12/2021    Comprehensive Metabolic Panel     Standing Status:   Future     Standing Expiration Date:   6/12/2021    Lipid Panel     Standing Status:   Future     Standing Expiration Date:   6/12/2021     Order Specific Question:   Is Patient Fasting?/# of Hours     Answer:   yes    Microalbumin, Ur     Standing Status:   Future     Standing Expiration Date:   6/12/2021    Hemoglobin A1C     Standing Status:   Future     Standing Expiration Date:   6/12/2021     DIABETES FOOT EXAM     No orders of the defined types were placed in this encounter. Patientgiven educational materials - see patient instructions. Discussed use, benefit,and side effects of prescribed medications. All patient questions answered. Ptvoiced understanding. Reviewed health maintenance. Instructed to continue currentmedications, diet and exercise. Patient agreed with treatment plan. Follow up asdirected.      Electronically signed by Venancio Latham MD on 6/12/2020 at 1:58 PM

## 2020-07-17 ENCOUNTER — TELEPHONE (OUTPATIENT)
Dept: ONCOLOGY | Age: 65
End: 2020-07-17

## 2020-07-17 NOTE — TELEPHONE ENCOUNTER
Our records indicate that your patient is due for their annual lung cancer screening follow up testing. For your convenience, we have pended the order for the scan for you. If you do not agree with the need for the test, please cancel the order and let us know. Sincerely,    [address] [place] [address] Screening Program    Auto printed reminder letter sent to patient.

## 2020-09-21 ENCOUNTER — OFFICE VISIT (OUTPATIENT)
Dept: UROLOGY | Age: 65
End: 2020-09-21
Payer: MEDICARE

## 2020-09-21 ENCOUNTER — HOSPITAL ENCOUNTER (OUTPATIENT)
Dept: LAB | Age: 65
Discharge: HOME | End: 2020-09-21
Payer: MEDICARE

## 2020-09-21 VITALS
OXYGEN SATURATION: 96 % | DIASTOLIC BLOOD PRESSURE: 82 MMHG | WEIGHT: 224.8 LBS | BODY MASS INDEX: 32.18 KG/M2 | HEART RATE: 110 BPM | HEIGHT: 70 IN | SYSTOLIC BLOOD PRESSURE: 124 MMHG

## 2020-09-21 LAB — PROSTATE SPECIFIC ANTIGEN: 1.66 UG/L

## 2020-09-21 PROCEDURE — 84153 ASSAY OF PSA TOTAL: CPT

## 2020-09-21 PROCEDURE — G8417 CALC BMI ABV UP PARAM F/U: HCPCS | Performed by: UROLOGY

## 2020-09-21 PROCEDURE — 36415 COLL VENOUS BLD VENIPUNCTURE: CPT

## 2020-09-21 PROCEDURE — 3017F COLORECTAL CA SCREEN DOC REV: CPT | Performed by: UROLOGY

## 2020-09-21 PROCEDURE — 1036F TOBACCO NON-USER: CPT | Performed by: UROLOGY

## 2020-09-21 PROCEDURE — G8427 DOCREV CUR MEDS BY ELIG CLIN: HCPCS | Performed by: UROLOGY

## 2020-09-21 PROCEDURE — 99214 OFFICE O/P EST MOD 30 MIN: CPT | Performed by: UROLOGY

## 2020-09-21 NOTE — PROGRESS NOTES
Edward Gomez MD        [address] [address]  Dept: [phone]  Dept Fax: [phone]  Loc: [phone]      [facility] Office Note - Follow up patient    Patient:  Jayesh Chavarria  YOB: 1955  Date: 9/21/2020    The patient is a 63 y.o. male who presents today for evaluation of the following problems:   Chief Complaint   Patient presents with    Other     1 year f/u    referred/consultation requested by Lion Gomez MD.    HISTORY OF PRESENT ILLNESS:     Elevated PSA  Onset was  Weeks ago  Overall, the problem(s) are better  Severity is described as moderate. Associated Symptoms: No dysuria, no gross hematuria. Current Pain Severity: 0    Did not get PSA   Doing well, no complaints  PSA has dropped    Secondary Diagnosis:    ED- new problem. not bothered. Summary of Previous Records:  Memory issues from previous stroke in 2016. Former smoker  No problems with voiding  No family hx of prostate cancer      Requested/reviewed records from Lion Gomez MD office and/or outside physician/EMR    (Patient's old records have been requested, reviewed and pertinent findings summarized in today's note.)    Procedures Today: N/A      Last several PSA's:  Lab Results   Component Value Date    PSA 1.73 08/16/2019    PSA 5.86 (H) 07/02/2019       Last total testosterone:  No results found for: TESTOSTERONE    Urinalysis today:  No results found for this visit on 09/21/20.     Last BUN and creatinine:  Lab Results   Component Value Date    BUN 14 07/02/2019     Lab Results   Component Value Date    CREATININE 1.01 07/02/2019       Additional Lab/Culture results: none    Imaging Reviewed during this Office Visit:   Edward Gomez MD independently reviewed the images and verified the radiology reports from:    Ct Head Wo Contrast    Result Date: 7/12/2019  EXAMINATION: CT OF THE HEAD WITHOUT CONTRAST  7/12/2019 4:17 pm TECHNIQUE: CT of the head was performed without the administration of intravenous contrast. Dose modulation, iterative reconstruction, and/or weight based adjustment of the mA/kV was utilized to reduce the radiation dose to as low as reasonably achievable. COMPARISON: None. HISTORY: ORDERING SYSTEM PROVIDED HISTORY: Right homonymous hemianopsia TECHNOLOGIST PROVIDED HISTORY: history of CVA Reason for Exam: Balance and memory loss with vision changes. History of CVA 1 year ago Acuity: Chronic Type of Exam: Initial FINDINGS: BRAIN/VENTRICLES: There is no acute intracranial hemorrhage, mass effect or midline shift. No abnormal extra-axial fluid collection. The gray-white differentiation is maintained without evidence of an acute infarct. There is no evidence of hydrocephalus. Old ischemic strokes with resultant encephalomalacia in the left occipital lobe and posterior aspect of right temporal lobe. ORBITS: The visualized portion of the orbits demonstrate no acute abnormality. SINUSES: The visualized paranasal sinuses and mastoid air cells demonstrate no acute abnormality. SOFT TISSUES/SKULL:  No acute abnormality of the visualized skull or soft tissues. Small sebaceous cyst with calcifications in the subcutaneous fat of the posterior parietal scalp along the midline. No acute intracranial abnormality. Old ischemic strokes with resultant encephalomalacia in the left occipital lobe and posterior aspect of right temporal lobe. Vl Dup Carotid Bilateral    Result Date: 7/12/2019  EXAMINATION: ULTRASOUND EVALUATION OF THE CAROTID ARTERIES 7/12/2019 COMPARISON: None.  HISTORY: ORDERING SYSTEM PROVIDED HISTORY: Right homonymous hemianopsia TECHNOLOGIST PROVIDED HISTORY: history of CVA Reason for Exam: right homonymous hemianopsia Acuity: Chronic Type of Exam: Subsequent/Follow-up FINDINGS: RIGHT: The right common carotid artery demonstrates peak systolic velocities of 65, 61 cm/sec in the proximal and distal segments respectively. The right internal carotid artery demonstrates the systolic velocities of 74, 60, 38 cm/sec in the proximal, mid and distal segments respectively. The external carotid artery is patent. The vertebral artery demonstrates normal antegrade flow. No evidence of focal atherosclerotic plaque. ICA/CCA ratio of 1.1. LEFT: The left common carotid artery demonstrates peak systolic velocities of 67, 70 cm/sec in the proximal and distal segments respectively. The left internal carotid artery demonstrates the systolic velocities of 43, 50, 50 cm/sec in the proximal, mid and distal segments respectively. The external carotid artery is patent. The vertebral artery demonstrates normal antegrade flow. A focus of mild smooth plaque is noted at the origin of the internal carotid artery. ICA/CCA ratio of 0.7. The right internal carotid artery demonstrates 0-50% stenosis. The left internal carotid artery demonstrates 0-50% stenosis. Bilateral vertebral arteries are patent with flow in the normal direction.        PAST MEDICAL, FAMILY AND SOCIAL HISTORY:  Past Medical History:   Diagnosis Date    CVA [facility]cerebral vascular accident) ([facility] [address]     Diabetes mellitus ([facility] [address]     History of back surgery     Measles     Osteoarthritis      Past Surgical History:   Procedure Laterality Date    BACK SURGERY      HERNIA REPAIR       Family History   Problem Relation Age of Onset    High Blood Pressure Mother     Cataracts Mother     Diabetes Maternal Grandmother      Outpatient Medications Marked as Taking for the 9/21/20 encounter (Office Visit) with Suzette Blanco MD   Medication Sig Dispense Refill    donepezil (ARICEPT) 10 MG tablet TAKE 1 TABLET DAILY WITH SUPPER 90 tablet 0    amLODIPine (NORVASC) 10 MG tablet TAKE 1 TABLET DAILY 90 tablet 3    metFORMIN (GLUCOPHAGE) 500 MG tablet TAKE 1 TABLET TWICE A  tablet 3    lisinopril (PRINIVIL;ZESTRIL) 20 MG tablet TAKE 1 TABLET DAILY 90 tablet 3    clopidogrel (PLAVIX) 75 MG tablet TAKE 1 TABLET DAILY 90 tablet 3    hydrALAZINE (APRESOLINE) 25 MG tablet TAKE 1 TABLET THREE TIMES A  tablet 3    atorvastatin (LIPITOR) 40 MG tablet TAKE 1 TABLET DAILY 90 tablet 3    cyclobenzaprine (FLEXERIL) 10 MG tablet TAKE 1 TABLET THREE TIMES A  tablet 3    aspirin 81 MG tablet Take 81 mg by mouth daily      vitamin B-12 (CYANOCOBALAMIN) 1000 MCG tablet Take 1,000 mcg by mouth daily      Cholecalciferol (VITAMIN D3) 5000 units TABS Take 5,000 Units by mouth daily      vitamin E 400 UNIT capsule Take 400 Units by mouth daily      magnesium oxide (MAG-OX) 400 MG tablet Take 400 mg by mouth daily         Patient has no known allergies. Social History     Tobacco Use   Smoking Status Former Smoker    Packs/day: 2.00    Years: 30.00    Pack years: 60.00    Types: Cigarettes    Start date:     Last attempt to quit: 2016    Years since quittin.3   Smokeless Tobacco Never Used      (If patient a smoker, smoking cessation counseling offered)   Social History     Substance and Sexual Activity   Alcohol Use None       REVIEW OF SYSTEMS:  Constitutional: negative  Eyes: negative  Respiratory: negative  Cardiovascular: negative  Gastrointestinal: negative  Genitourinary: see HPI  Musculoskeletal: negative  Skin: negative   Neurological: negative  Hematological/Lymphatic: negative  Psychological: negative        Physical Exam:    This a 63 y.o. male  Vitals:    20 1119   BP: 124/82   Pulse: 110   SpO2: 96%     Body mass index is 32.72 kg/m². Constitutional: Patient in no acute distress;     Assessment and Plan        1. Elevated PSA               Plan:       Not bothered by ED. No meds  Doing well. Needs PSA today  No voiding complaints  Follow up in one year with PSA    Prescriptions Ordered:  No orders of the defined types were placed in this encounter.      Orders Placed:  Orders Placed This Encounter   Procedures    PSA, Diagnostic     Standing Status:   Future     Standing Expiration Date:   9/21/2021    PSA, Diagnostic     Standing Status:   Future     Standing Expiration Date:   9/21/2022            Brennan Jensen MD

## 2020-09-24 ENCOUNTER — OFFICE VISIT (OUTPATIENT)
Dept: NEUROLOGY | Age: 65
End: 2020-09-24
Payer: MEDICARE

## 2020-09-24 VITALS
WEIGHT: 222.2 LBS | DIASTOLIC BLOOD PRESSURE: 72 MMHG | SYSTOLIC BLOOD PRESSURE: 134 MMHG | HEIGHT: 70 IN | TEMPERATURE: 97 F | BODY MASS INDEX: 31.81 KG/M2 | HEART RATE: 108 BPM | OXYGEN SATURATION: 98 %

## 2020-09-24 PROBLEM — F03.90 DEMENTIA WITHOUT BEHAVIORAL DISTURBANCE (HCC): Status: ACTIVE | Noted: 2020-09-24

## 2020-09-24 PROCEDURE — 3044F HG A1C LEVEL LT 7.0%: CPT | Performed by: PSYCHIATRY & NEUROLOGY

## 2020-09-24 PROCEDURE — G8417 CALC BMI ABV UP PARAM F/U: HCPCS | Performed by: PSYCHIATRY & NEUROLOGY

## 2020-09-24 PROCEDURE — 3017F COLORECTAL CA SCREEN DOC REV: CPT | Performed by: PSYCHIATRY & NEUROLOGY

## 2020-09-24 PROCEDURE — 99215 OFFICE O/P EST HI 40 MIN: CPT | Performed by: PSYCHIATRY & NEUROLOGY

## 2020-09-24 PROCEDURE — G8427 DOCREV CUR MEDS BY ELIG CLIN: HCPCS | Performed by: PSYCHIATRY & NEUROLOGY

## 2020-09-24 PROCEDURE — 1036F TOBACCO NON-USER: CPT | Performed by: PSYCHIATRY & NEUROLOGY

## 2020-09-24 PROCEDURE — 99214 OFFICE O/P EST MOD 30 MIN: CPT

## 2020-09-24 PROCEDURE — 2022F DILAT RTA XM EVC RTNOPTHY: CPT | Performed by: PSYCHIATRY & NEUROLOGY

## 2020-09-24 ASSESSMENT — ENCOUNTER SYMPTOMS
CONSTIPATION: 0
CHANGE IN BOWEL HABIT: 0
EYE ITCHING: 0
COLOR CHANGE: 0
VOMITING: 0
BOWEL INCONTINENCE: 0
SHORTNESS OF BREATH: 0
SWOLLEN GLANDS: 0
PHOTOPHOBIA: 0
BLOOD IN STOOL: 0
COUGH: 0
FACIAL SWELLING: 0
SINUS PRESSURE: 0
EYE DISCHARGE: 0
CHEST TIGHTNESS: 0
CHOKING: 0
SORE THROAT: 0
EYE REDNESS: 0
DIARRHEA: 0
VISUAL CHANGE: 1
NAUSEA: 0
ABDOMINAL PAIN: 0
EYE PAIN: 0
VOICE CHANGE: 0
TROUBLE SWALLOWING: 0
BACK PAIN: 1
ABDOMINAL DISTENTION: 0
WHEEZING: 0
APNEA: 0

## 2020-09-24 NOTE — PROGRESS NOTES
[facility]  Neurology  [address] [place] [address]  TAL:[phone]   Fax: [phone]    SUBJECTIVE:     PATIENT ID:  Kelsie Faith is a   LEFT     HANDED 63 y.o. male. Cerebrovascular Accident   This is a chronic problem. Episode onset: SEPT. 2016   The problem has been unchanged. Associated symptoms include numbness and a visual change. Pertinent negatives include no abdominal pain, anorexia, arthralgias, change in bowel habit, chest pain, chills, congestion, coughing, diaphoresis, fatigue, fever, headaches, joint swelling, myalgias, nausea, neck pain, rash, sore throat, swollen glands, urinary symptoms, vertigo, vomiting or weakness. Nothing aggravates the symptoms. Treatments tried: ASA   AND PLAVIX   The treatment provided moderate relief. Neurologic Problem   The patient's primary symptoms include clumsiness, focal sensory loss, a loss of balance, memory loss and a visual change. The patient's pertinent negatives include no altered mental status, focal weakness, near-syncope, slurred speech, syncope or weakness. This is a chronic problem. Episode onset: MORE  THAN   2-3  YEARS. The neurological problem developed insidiously. There was lower extremity, left-sided and right-sided focality noted. Associated symptoms include back pain. Pertinent negatives include no abdominal pain, auditory change, aura, bladder incontinence, bowel incontinence, chest pain, confusion, diaphoresis, dizziness, fatigue, fever, headaches, light-headedness, nausea, neck pain, palpitations, shortness of breath, vertigo or vomiting. Past treatments include medication, bed rest and sleep. The treatment provided mild relief. His past medical history is significant for a CVA and dementia. There is no history of a bleeding disorder, a clotting disorder, head trauma, liver disease, mood changes or seizures.            History obtained from  The patient     And   HIS  WIFE       and other available   medical  records   were  Also  reviewed. The  Duration,  Quality,  Severity,  Location,  Timing,  Context,  Modifying  Factors   Of   The   Chief   Complaint   And  Present  Illness       Was   Reviewed   In   Chronological   [place]. PATIENT'S  MAIN  CONCERNS INCLUDE :                     1)     H/O    OLD    STROKE  IN     2016           WITH                                       HOSPITALIZATION  IN  [place]                             2)    PATIENT  WAS    REPORTED  TO    UNREPONSIVE                                       DUE  TO  CARDIAC    PROBLEMS                                 3 )    MRI  BRAIN   IN    SEPT. 2016     SHOWED   :                               A)   BILATERAL  TEMPORAL  INFARCTS                                 B)   OLD  LEFT  OCCIPITAL INFARCT                               4)      H/O   POST  STROKE     MEMORY  AND  COGNITIVE  PROBLEMS                                              SINCE     2016                                    -  ON  ARICEPT    5  MG  DAILY                             5)      RESIDUAL   RIGHT     VISUAL   FIELD  DEFECTS                                              DUE  TO  PREVIOUS  STROKE                               6)     CURRENTLY  ON   ASA  AND PLAVIX                                         PROPHYLACTIC ALLY           AND                                    TOLERATING  THE  SAME.                                7)     MULTIPLE  CO  MORBID  MEDICAL  CONDITIONS                                 BEING  FOLLOWED  BY  HIS  PCP                                8)     DIABETIC  PERIPHERAL  POLYNEUROPATHY                                               MOSTLY   BOTH  LOWER  EXTREMITIES                                 9)   H/O   CHRONIC  LUMBAR  PAIN                            PREVIOUS  H/O   LUMBAR  DISC   SURGERIES                                    10)     MILD    BALANCE PROBLEMS                                              - STABLE                              11)    H/O  CHRONIC    MILD  ANXIETY. PATIENT  DENIES  ANY  DEPRESSIVE  SYMPTOMS                              12)     PREVIOUS  H/O   SMOKING. PATIENT  QUIT  SMOKING  SINCE     SEPT. 2016                                                                 13)        HAD    NEURO  DIAGNOSTIC  EVALUATIONS  IN  July 2019                           A)   CT   HEAD  SHOWED   NO  ACUTE  PATHOLOGY                                           REMOTE      STROKES   -   REMAIN    UN  CHANGED                           B)    CAROTID  DOPPLER,  EEG  ,  LABS  SHOW   NO  SIGNIFICANT                                            ABNORMALITIES                                      14)     VARIOUS  RISK   FACTORS   WERE  REVIEWED   AND   DISCUSSED. PATIENT   HAS  MULTIPLE   MEDICAL, NEUROLOGICAL      PROBLEMS                     PATIENT'S   MANAGEMENT  IS  CHALLENGING.                                             15)          AS   DISCUSSED    WITH  PATIENT   AND  HIS  WIFE                                PATIENT     STARTED    ON       ARICEPT    10  MG  PO   LAMB   WITH  SUPPER                                       IN   SEPT. 2019                                     PATIENT  TOLERATING  THE  SAME  AND  GETTING  BENEFIT. EXPECTATIONS,   GOALS   AND  SIDE  EFFECTS  MEDICATION    WERE                                 REVIEWED     AND   DISCUSSED    IN    DETAIL. 16)     PATIENT  AND  HIS  WIFE     DENIES    ANY   NEW  NEUROLOGICAL  CONCERNS.                                                          PRECIPITATING  FACTORS: including  fever/infection, exertion/relaxation, position change, stress, weather change,                        medications/alcohol, time of day/darkness/light  Are  absent MODIFYING  FACTORS:  fever/infection, exertion/relaxation, position change, stress, weather change,                        medications/alcohol, time of day/darkness/light  Are  absent             Patient   Indicates   The  Presence   And  The  Absence  Of  The  Following    Associated  And   Additional  Neurological    Symptoms:                                Balance  And coordination   problems  present           Gait problems     absent            Headaches      absent              Migraines           absent           Memory problemspresent              Confusion        absent            Paresthesia   numbness          present           Seizures  And  Starring  Episodes           absent           Syncope,  Near  syncopal episodes         absent           Speech   problems           absent             Swallowing   Problems      absent            Dizziness,  Light headedness           absent              Vertigo        absent             Generalized   Weakness    absent              focal  Weakness     absent             Tremors         absent              Sleep  Problems     absent             History  Of   Recent  Head  Injury     absent             History  Of   Recent  TIA     absent             History  Of   Recent    Stroke     absent             Neck  Pain   and   Neck muscle  Spasms  absent               Radiating  down   And   Weakness           absent            Lower back   Pain  And     Spasms  present              Radiating    Down   And   Weakness          present                H/O    FALLS        absent               History  Of   Visual  Symptoms   PRESENT                    Associated   Diplopia       absent                                               Also   Additional   Symptoms   Present    As  Documented    In   The   detailed                  Review  Of  Systems   And    Please   Refer   To    Them for   Additional    Information.                   Any components  That are either phenylephrine (MYDFRIN) 2.5 % ophthalmic solution 1 drop  1 drop Both Eyes Once Asya Charles MD        tropicamide (MYDRIACYL) 1 % ophthalmic solution 1 drop  1 drop Both Eyes Once Asya Charles MD             No Known Allergies      Family History   Problem Relation Age of Onset    High Blood Pressure Mother     Cataracts Mother     Diabetes Maternal Grandmother          Social History     Socioeconomic History    Marital status:      Spouse name: Not on file    Number of children: Not on file    Years of education: Not on file    Highest education level: Not on file   Occupational History    Not on file   Social Needs    Financial resource strain: Not on file    Food insecurity     Worry: Not on file     Inability: Not on file    Transportation needs     Medical: Not on file     Non-medical: Not on file   Tobacco Use    Smoking status: Former Smoker     Packs/day: 2.00     Years: 30.00     Pack years: 60.00     Types: Cigarettes     Start date:      Last attempt to quit: 2016     Years since quittin.3    Smokeless tobacco: Never Used   Substance and Sexual Activity    Alcohol use: Not on file    Drug use: Not on file    Sexual activity: Not on file   Lifestyle    Physical activity     Days per week: Not on file     Minutes per session: Not on file    Stress: Not on file   Relationships    Social connections     Talks on phone: Not on file     Gets together: Not on file     Attends Confucianist service: Not on file     Active member of club or organization: Not on file     Attends meetings of clubs or organizations: Not on file     Relationship status: Not on file    Intimate partner violence     Fear of current or ex partner: Not on file     Emotionally abused: Not on file     Physically abused: Not on file     Forced sexual activity: Not on file   Other Topics Concern    Not on file   Social History Narrative    Not on file       Vitals:    20 1529   BP: 134/72   Pulse: 108 Temp: 97 °F (36.1 °C)   SpO2: 98%         Wt Readings from Last 3 Encounters:   09/24/20 222 lb 3.2 oz (100.8 kg)   09/21/20 224 lb 12.8 oz (102 kg)   06/12/20 217 lb (98.4 kg)         BP Readings from Last 3 Encounters:   09/24/20 134/72   09/21/20 124/82   06/12/20 138/88       Chemistries  Lab Results   Component Value Date     07/02/2019    K 4.3 07/02/2019     07/02/2019    CO2 26 07/02/2019    BUN 14 07/02/2019    CREATININE 1.01 07/02/2019    CALCIUM 9.7 07/02/2019    PROT 7.0 07/02/2019    LABALBU 4.5 07/02/2019    BILITOT 0.47 07/02/2019    ALKPHOS 67 07/02/2019    AST 13 07/02/2019    ALT 19 07/02/2019     Lab Results   Component Value Date    ALKPHOS 67 07/02/2019    ALT 19 07/02/2019    AST 13 07/02/2019    PROT 7.0 07/02/2019    BILITOT 0.47 07/02/2019    LABALBU 4.5 07/02/2019     Lab Results   Component Value Date    BUN 14 07/02/2019    CREATININE 1.01 07/02/2019     Lab Results   Component Value Date    CALCIUM 9.7 07/02/2019    MG 1.9 07/02/2019     Lab Results   Component Value Date    AST 13 07/02/2019    ALT 19 07/02/2019     Lab Results   Component Value Date    [id number] 857 07/08/2019           Review of Systems   Constitutional: Negative for appetite change, chills, diaphoresis, fatigue, fever and unexpected weight change. HENT: Negative for congestion, dental problem, drooling, ear discharge, ear pain, facial swelling, hearing loss, mouth sores, nosebleeds, postnasal drip, sinus pressure, sore throat, tinnitus, trouble swallowing and voice change. Eyes: Positive for visual disturbance. Negative for photophobia, pain, discharge, redness and itching. Respiratory: Negative for apnea, cough, choking, chest tightness, shortness of breath and wheezing. Cardiovascular: Negative for chest pain, palpitations, leg swelling and near-syncope.    Gastrointestinal: Negative for abdominal distention, abdominal pain, anorexia, blood in stool, bowel incontinence, change in bowel habit, constipation, diarrhea, nausea and vomiting. Endocrine: Negative for cold intolerance, heat intolerance, polydipsia, polyphagia and polyuria. Genitourinary: Negative for bladder incontinence. Musculoskeletal: Positive for back pain. Negative for arthralgias, gait problem, joint swelling, myalgias, neck pain and neck stiffness. Skin: Negative for color change, pallor, rash and wound. Allergic/Immunologic: Negative for environmental allergies, food allergies and immunocompromised state. Neurological: Positive for numbness and loss of balance. Negative for dizziness, vertigo, tremors, focal weakness, seizures, syncope, facial asymmetry, speech difficulty, weakness, light-headedness and headaches. Hematological: Negative for adenopathy. Does not bruise/bleed easily. Psychiatric/Behavioral: Positive for decreased concentration and memory loss. Negative for agitation, behavioral problems, confusion, dysphoric mood, hallucinations, self-injury, sleep disturbance and suicidal ideas. The patient is nervous/anxious. The patient is not hyperactive. OBJECTIVE:    Physical Exam  Constitutional:       Appearance: He is well-developed. HENT:      Head: Normocephalic and atraumatic. No raccoon eyes or Adame's sign. Right Ear: External ear normal.      Left Ear: External ear normal.      Nose: Nose normal.   Eyes:      Conjunctiva/sclera: Conjunctivae normal.   Neck:      Musculoskeletal: Normal range of motion and neck supple. Normal range of motion. No neck rigidity or muscular tenderness. Thyroid: No thyroid mass or thyromegaly. Vascular: No carotid bruit. Trachea: No tracheal deviation. Meningeal: Brudzinski's sign and Kernig's sign absent. Cardiovascular:      Rate and Rhythm: Normal rate and regular rhythm. Pulmonary:      Effort: Pulmonary effort is normal.   Musculoskeletal:         General: No tenderness. Skin:     General: Skin is warm.       Coloration: Skin Strength. No facial  Weakness. 8 CN :  Hearing  Appears within normal limits          9, 10 CN: Normal   spontaneous, reflex   palate   movements         11 CN:   Normal  Shoulder  shrug and  strength         12 CN :   Normal  Tongue movements and  Tongue  In midline                        No tongue   Fasciculations or atrophy       C) MOTOR  EXAM:                 Strength  In upper  And  Lower   extremities   within   normal limits               No  Drift. No  Atrophy               Rapid   alternating  And  repetitions  Movements  within   normal limits               Muscle  Tone  In upper  And  Lower  Extremities  normal                No rigidity. No  Spasticity. Bradykinesia   absent               No  Asterixis. Sustention  Tremor , Resting   Tremor   absent                    No   other  Abnormal  Movements noted           D) SENSORY :               Light   touch, pinprick,   position  And  Vibration  DECREASED                             BELOW  BOTH  KNEE  LEVELS        E) REFLEXES:                   Deep  Tendon  Reflexes     DECREASED                    No  pathological  Reflexes  Bilaterally.                                   F) COORDINATION  AND  GAIT :                                Station and  Gait    SLOW                               Romberg 's test   POSITIVE                            Ataxia negative        ASSESSMENT:        Patient Active Problem List   Diagnosis    CVA (cerebral vascular accident) ([address]    Low back pain    Gastroesophageal reflux disease without esophagitis    Memory loss    Hyperlipidemia    Essential hypertension    Type 2 diabetes mellitus without complication, without long-term current use of insulin ([address]    Right homonymous hemianopsia    Hypertensive retinopathy of both eyes    Combined forms of age-related cataract of both eyes    History of back surgery    Diabetes mellitus ([address]    [facility]    Infarction of temporal lobe ([facility]    Dementia without behavioral disturbance (HCC)             CT OF THE HEAD WITHOUT CONTRAST  7/12/2019 4:17 pm       TECHNIQUE:   CT of the head was performed without the administration of intravenous   contrast. Dose modulation, iterative reconstruction, and/or weight based   adjustment of the mA/kV was utilized to reduce the radiation dose to as low   as reasonably achievable.       COMPARISON:   None.       HISTORY:   ORDERING SYSTEM PROVIDED HISTORY: Right homonymous hemianopsia   TECHNOLOGIST PROVIDED HISTORY:   history of CVA   Reason for Exam: Balance and memory loss with vision changes.  History of CVA   1 year ago   Acuity: Chronic   Type of Exam: Initial       FINDINGS:   BRAIN/VENTRICLES: There is no acute intracranial hemorrhage, mass effect or   midline shift.  No abnormal extra-axial fluid collection.  The gray-white   differentiation is maintained without evidence of an acute infarct.  There is   no evidence of hydrocephalus.  Old ischemic strokes with resultant   encephalomalacia in the left occipital lobe and posterior aspect of right   temporal lobe.       ORBITS: The visualized portion of the orbits demonstrate no acute abnormality.       SINUSES: The visualized paranasal sinuses and mastoid air cells demonstrate   no acute abnormality.       SOFT TISSUES/SKULL:  No acute abnormality of the visualized skull or soft   tissues.  Small sebaceous cyst with calcifications in the subcutaneous fat of   the posterior parietal scalp along the midline.           Impression   No acute intracranial abnormality.       Old ischemic strokes with resultant encephalomalacia in the left occipital   lobe and posterior aspect of right temporal lobe.             History:  Stroke    Exam/Technique:  Multiplanar multisequence images were obtained without the use of contrast.    Comparison:  Cerebral MRI from September 29, 2016    Alejo Taylor is no evidence of recent infarcts on diffusion-weighted images. There is an old left occipital cortical infarct medially, and  smaller areas of old infarction in the mesial temporal cortex bilaterally. These were recent time of the previous MRI There are also scattered punctate areas of increased T2 signal in the   deep supratentorial white matter that are consistent with chronic small vessel ischemic change. This also one 3 mm diameter fluid intensity area in the left cerebellar white matter that is consistent with an old lacunar infarct. Ventricles and CSF spaces are bilaterally symmetric and within normal limits. Gray-white matter differentiation is normal throughout. Incidental note is made of an apparent sedation cyst along the occipital portion of the scalp. There is some fluid content in mastoid air cells bilaterally. Otherwise, no gross abnormalities are displayed in the bones or soft tissues of the skull base. IMPRESSION:  Old left occipital and bilateral temporal infarcts, and additional chronic abnormalities. No evidence of recent infarcts or other additional intracranial abnormalities.      Finalized by Joel Beach MD on 12/20/2016 2:23 PM      ULTRASOUND EVALUATION OF THE CAROTID ARTERIES       7/12/2019       COMPARISON:   None.       HISTORY:   ORDERING SYSTEM PROVIDED HISTORY: Right homonymous hemianopsia   TECHNOLOGIST PROVIDED HISTORY:   history of CVA   Reason for Exam: right homonymous hemianopsia   Acuity: Chronic   Type of Exam: Subsequent/Follow-up       FINDINGS:       RIGHT:       The right common carotid artery demonstrates peak systolic velocities of 65,   61 cm/sec in the proximal and distal segments respectively.       The right internal carotid artery demonstrates the systolic velocities of 74,   60, 38 cm/sec in the proximal, mid and distal segments respectively.       The external carotid artery is patent.  The vertebral artery demonstrates   normal antegrade flow.       No evidence of MAINTAIN  REGULAR  SLEEP  WAKE  CYCLES. *   TO  HAVE  ADEQUATE  REST  AND   SLEEP    HOURS.          *    AVOID  ANY USAGE OF    TOBACCO,              EXCESSIVE  ALCOHOL  AND   ILLEGAL   SUBSTANCES          *  CONTINUE   MEDICATIONS    PRESCRIBED   BY NEUROLOGIST    AS    RECOMMENDED       *   Compliance   With  Medications   And  Instructions          * CURRENTLY    TOLERATING  THE  PRESCRIBED   MEDICATIONS. WITHOUT  ANY  SIGNIFICANT  SIDE  EFFECTS   &  GETTING BENEFIT. *    Antiplatelet  therapy    As   Recommended  Was   Discussed          *    Prophylactic  Use   Of     Vitamin   B   Complex,  Folic  Acid,    Vitamin  B12    Multivitamin,       Calcium  With  magnesium  And  Vit D    Supplementations   Over  The  Counter  Discussed               *FOOT  CARE, DAILY  INSPECTION  OF  FEET   AND         PERIODIC  PODIATRY EVALUATIONS . *  PATIENT  IS  ALSO   COUNSELED   TO  KEEP    ACTIVITIES:         A)   SIMPLE      B)  ORGANIZED      C)  WRITEDOWN                   *  EVALUATIONS  AND  FOLLOW UP:                                      *CARDIOLOGY              *   OPTHALMOLOGY                                           *         CURRENTLY  ON   ASA  AND PLAVIX                                         PROPHYLACTIC ALLY           AND                                    TOLERATING  THE  SAME. *          AS   DISCUSSED    WITH  PATIENT   AND  HIS  WIFE                                PATIENT     STARTED    ON       ARICEPT    10  MG  PO   LAMB   WITH  SUPPER                                       IN   SEPT. 2019                                     PATIENT  TOLERATING  THE  SAME  AND  GETTING  BENEFIT. *              EXPECTATIONS,   GOALS   AND  SIDE  EFFECTS  MEDICATION    WERE                                 REVIEWED     AND   DISCUSSED    IN    DETAIL.                         *PATIENT   TO  FOLLOW  UP  WITH   PRIMARY  CARE Conditions. Electronically signed by Kristen Tristan MD    Board Certified in  Neurology &  In  [facility] ([facility])      DISCLAIMER:   Although every effort was made to ensure the accuracy of this  electronictranscription, some errors in transcription may have occurred. GENERAL PATIENT INSTRUCTIONS:      A Healthy Lifestyle: Care Instructions   Your Care Instructions   A healthy lifestyle can help you feel good, stay at ahealthy weight, and have plenty of energy for both work and play. A healthy lifestyle is something you can share with your whole family.  A healthy lifestyle also can lower your risk for serious health problems, such ashigh blood pressure, heart disease, and diabetes.  You can follow a few steps listed below to improve your health and the health of your family.  Follow-up careis a key part of your treatment and safety. Be sure to make and go to all appointments, and call your doctor if you are having problems. Its also a good idea to know your test results and keep a list of the medicines you take.  How can you care for yourself at home?  Do not eat too much sugar, fat, or fast foods. You can still have dessert and treats nowand then. The goal is moderation.  Start small to improve your eating habits. Pay attention to portion sizes, drink less juice and soda pop, and eat more fruits and vegetables.  Eat a healthy amount of food. A 3-ounce serving of meat, for example, is about the size of a deck of cards. Fill the rest of your plate with vegetables and whole grains.  Limit theamount of soda and sports drinks you have every day. Drink more water when you are thirsty.  Eat at least 5 servings of fruits and vegetables every day. It may seem like a lot, but it is not hard to reach this goal. Aserving or helping is 1 piece of fruit, 1 cup of vegetables, or 2 cups of leafy, raw vegetables.  Have an apple or hard and gets your heart pumping is exercise. Here are sometips:   Walk to do errands or to take your child to school or the bus.  Go for a family bike ride after dinner instead of watching TV.  Where can you learn more?  Go totps://[email or web address].[employer]. org and sign in to your [employer] account. Enter U628 in the Search HealthInformation box to learn more about \"A Healthy Lifestyle: Care Instructions. \"     If you do not have anaccount, please click on the \"Sign Up Now\" link.  Current as of: July 26, 2016   Content Version: 11.2   © 5647-6583 [employer]. Care instructions adapted under license by [facility] If you have questions about a medical condition or this instruction, always ask your healthcare professional. [employer] disclaims any warranty or liability for your use of this information.

## 2020-09-24 NOTE — PATIENT INSTRUCTIONS
[facility] NEUROLOGY    Due to the complex nature of our neurological testing, It is the policy of the Neurology Department not to release the results of your testing over the phone. Once all testing is completed and we have all the results back, Dr. Rachana Vega will then personally go over all the results with you and answer any questions that you may have during a follow up appointment. If you are unable to keep this appointment, please notify the [facility] @ [phone], as soon as possible. Please bring your prescription bottles to all appointments. * FALL   PRECAUTIONS.          *  NO   DRIVING        *   ADEQUATE   FLUID  INTAKE   AND  ELECTROLYTE  BALANCE           * KEEP  DAIRY  OF   THE  NEUROLOGICAL  SYMPTOMS          *  TO  MAINTAIN  REGULAR  SLEEP  WAKE  CYCLES. *   TO  HAVE  ADEQUATE  REST  AND   SLEEP    HOURS.        *    AVOID  USAGE OF   TOBACCO,  EXCESSIVE  ALCOHOL                AND   ILLEGAL   SUBSTANCES,  IF  ANY          *  Maintain   Healthy  Life Style    With   Periodic  Monitoring  Of      Any  Medical  Conditions  Including   Elevated  Blood  Pressure,  Lipid  Profile,     Blood  Sugar levels  And   Heart  Disease. *   Period   Screening  For  Cancers  Involving  Breast,  Colon,   lungs  And  Other  Organs  As  Applicable,  In consultation   With  Your  Primary Care Providers. *  If   There is  Any  Significant  Worsening   Of  Current  Symptoms  And  Or  If    Any additional  New  Neurological  Symptoms                 Or  Significant  Concerns   Should  Call  911 or  Go  To  Emergency  Department  For  Further  Immediate  Evaluation.

## 2020-11-03 PROBLEM — I63.9 CVA (CEREBRAL VASCULAR ACCIDENT) (HCC): Status: RESOLVED | Noted: 2019-06-04 | Resolved: 2020-11-03

## 2021-02-01 ENCOUNTER — HOSPITAL ENCOUNTER (EMERGENCY)
Age: 66
Discharge: HOME | End: 2021-02-01
Attending: EMERGENCY MEDICINE
Payer: MEDICARE

## 2021-02-01 ENCOUNTER — APPOINTMENT (OUTPATIENT)
Dept: GENERAL RADIOLOGY | Age: 66
End: 2021-02-01
Payer: MEDICARE

## 2021-02-01 ENCOUNTER — APPOINTMENT (OUTPATIENT)
Dept: CT IMAGING | Age: 66
End: 2021-02-01
Payer: MEDICARE

## 2021-02-01 VITALS
TEMPERATURE: 96 F | BODY MASS INDEX: 27.09 KG/M2 | RESPIRATION RATE: 16 BRPM | SYSTOLIC BLOOD PRESSURE: 114 MMHG | OXYGEN SATURATION: 97 % | HEIGHT: 72 IN | HEART RATE: 91 BPM | DIASTOLIC BLOOD PRESSURE: 86 MMHG | WEIGHT: 200 LBS

## 2021-02-01 DIAGNOSIS — R55 SYNCOPE AND COLLAPSE: Primary | ICD-10-CM

## 2021-02-01 LAB
-: ABNORMAL
ABSOLUTE EOS #: 0.11 K/UL (ref 0–0.44)
ABSOLUTE IMMATURE GRANULOCYTE: 0.05 K/UL (ref 0–0.3)
ABSOLUTE LYMPH #: 2.01 K/UL (ref 1.1–3.7)
ABSOLUTE MONO #: 0.57 K/UL (ref 0.1–1.2)
AMORPHOUS: ABNORMAL
ANION GAP SERPL CALCULATED.3IONS-SCNC: 13 MMOL/L (ref 9–17)
BACTERIA: ABNORMAL
BASOPHILS # BLD: 1 % (ref 0–2)
BASOPHILS ABSOLUTE: 0.06 K/UL (ref 0–0.2)
BILIRUBIN URINE: NEGATIVE
BNP INTERPRETATION: NORMAL
BUN BLDV-MCNC: 18 MG/DL (ref 8–23)
BUN/CREAT BLD: 17 (ref 9–20)
CALCIUM SERPL-MCNC: 9.7 MG/DL (ref 8.6–10.4)
CASTS UA: ABNORMAL /LPF (ref 0–2)
CHLORIDE BLD-SCNC: 102 MMOL/L (ref 98–107)
CO2: 27 MMOL/L (ref 20–31)
COLOR: NORMAL
COMMENT UA: NORMAL
CREAT SERPL-MCNC: 1.08 MG/DL (ref 0.7–1.2)
CRYSTALS, UA: ABNORMAL /HPF
D-DIMER QUANTITATIVE: <0.27 MG/L FEU (ref 0–0.59)
DIFFERENTIAL TYPE: ABNORMAL
EKG ATRIAL RATE: 94 BPM
EKG P AXIS: 49 DEGREES
EKG P-R INTERVAL: 188 MS
EKG Q-T INTERVAL: 350 MS
EKG QRS DURATION: 92 MS
EKG QTC CALCULATION (BAZETT): 437 MS
EKG R AXIS: -37 DEGREES
EKG T AXIS: 32 DEGREES
EKG VENTRICULAR RATE: 94 BPM
EOSINOPHILS RELATIVE PERCENT: 2 % (ref 1–4)
EPITHELIAL CELLS UA: ABNORMAL /HPF (ref 0–5)
GFR AFRICAN AMERICAN: >60 ML/MIN
GFR NON-AFRICAN AMERICAN: >60 ML/MIN
GFR SERPL CREATININE-BSD FRML MDRD: ABNORMAL ML/MIN/{1.73_M2}
GFR SERPL CREATININE-BSD FRML MDRD: ABNORMAL ML/MIN/{1.73_M2}
GLUCOSE BLD-MCNC: 160 MG/DL (ref 70–99)
GLUCOSE URINE: NEGATIVE
HCT VFR BLD CALC: 49.4 % (ref 40.7–50.3)
HEMOGLOBIN: 15.9 G/DL (ref 13–17)
IMMATURE GRANULOCYTES: 1 %
INR BLD: 0.9
KETONES, URINE: NEGATIVE
LEUKOCYTE ESTERASE, URINE: NEGATIVE
LYMPHOCYTES # BLD: 29 % (ref 24–43)
MCH RBC QN AUTO: 30.3 PG (ref 25.2–33.5)
MCHC RBC AUTO-ENTMCNC: 32.2 G/DL (ref 25.2–33.5)
MCV RBC AUTO: 94.1 FL (ref 82.6–102.9)
MONOCYTES # BLD: 8 % (ref 3–12)
MUCUS: ABNORMAL
NITRITE, URINE: NEGATIVE
NRBC AUTOMATED: 0 PER 100 WBC
OTHER OBSERVATIONS UA: ABNORMAL
PARTIAL THROMBOPLASTIN TIME: 22.6 SEC (ref 23.9–33.8)
PDW BLD-RTO: 13 % (ref 11.8–14.4)
PH UA: 6 (ref 5–6)
PLATELET # BLD: 246 K/UL (ref 138–453)
PLATELET ESTIMATE: ABNORMAL
PMV BLD AUTO: 10.3 FL (ref 8.1–13.5)
POTASSIUM SERPL-SCNC: 4 MMOL/L (ref 3.7–5.3)
PRO-BNP: 24 PG/ML
PROTEIN UA: NEGATIVE
PROTHROMBIN TIME: 11.6 SEC (ref 11.5–14.2)
RBC # BLD: 5.25 M/UL (ref 4.21–5.77)
RBC # BLD: ABNORMAL 10*6/UL
RBC UA: ABNORMAL /HPF (ref 0–4)
RENAL EPITHELIAL, UA: ABNORMAL /HPF
SEG NEUTROPHILS: 59 % (ref 36–65)
SEGMENTED NEUTROPHILS ABSOLUTE COUNT: 4.23 K/UL (ref 1.5–8.1)
SODIUM BLD-SCNC: 142 MMOL/L (ref 135–144)
SPECIFIC GRAVITY UA: 1.02 (ref 1.01–1.02)
TRICHOMONAS: ABNORMAL
TROPONIN INTERP: NORMAL
TROPONIN T: NORMAL NG/ML
TROPONIN, HIGH SENSITIVITY: 21 NG/L (ref 0–22)
TURBIDITY: NORMAL
URINE HGB: NEGATIVE
UROBILINOGEN, URINE: NORMAL
WBC # BLD: 7 K/UL (ref 3.5–11.3)
WBC # BLD: ABNORMAL 10*3/UL
WBC UA: ABNORMAL /HPF (ref 0–4)
YEAST: ABNORMAL

## 2021-02-01 PROCEDURE — 84484 ASSAY OF TROPONIN QUANT: CPT

## 2021-02-01 PROCEDURE — 70450 CT HEAD/BRAIN W/O DYE: CPT

## 2021-02-01 PROCEDURE — 71045 X-RAY EXAM CHEST 1 VIEW: CPT

## 2021-02-01 PROCEDURE — 80048 BASIC METABOLIC PNL TOTAL CA: CPT

## 2021-02-01 PROCEDURE — 83880 ASSAY OF NATRIURETIC PEPTIDE: CPT

## 2021-02-01 PROCEDURE — 81001 URINALYSIS AUTO W/SCOPE: CPT

## 2021-02-01 PROCEDURE — 2580000003 HC RX 258: Performed by: EMERGENCY MEDICINE

## 2021-02-01 PROCEDURE — 85610 PROTHROMBIN TIME: CPT

## 2021-02-01 PROCEDURE — 99284 EMERGENCY DEPT VISIT MOD MDM: CPT

## 2021-02-01 PROCEDURE — 85730 THROMBOPLASTIN TIME PARTIAL: CPT

## 2021-02-01 PROCEDURE — 85379 FIBRIN DEGRADATION QUANT: CPT

## 2021-02-01 PROCEDURE — 85025 COMPLETE CBC W/AUTO DIFF WBC: CPT

## 2021-02-01 PROCEDURE — 93005 ELECTROCARDIOGRAM TRACING: CPT | Performed by: EMERGENCY MEDICINE

## 2021-02-01 RX ORDER — 0.9 % SODIUM CHLORIDE 0.9 %
1000 INTRAVENOUS SOLUTION INTRAVENOUS ONCE
Status: COMPLETED | OUTPATIENT
Start: 2021-02-01 | End: 2021-02-01

## 2021-02-01 RX ADMIN — SODIUM CHLORIDE 1000 ML: 9 INJECTION, SOLUTION INTRAVENOUS at 13:17

## 2021-02-01 NOTE — ED PROVIDER NOTES
[address] 71      Pt Name: Rony Klein  MRN: [id number]  Rubingflauren 1955  Date of evaluation: 2/1/2021      CHIEF COMPLAINT       Chief Complaint   Patient presents with    Loss of Consciousness     States he \"has these episodes of passing out. ..they don't know why. But I usually make it to the floor so I don't fall. \" Pt states today he was sitting at the table eating and he fell to the floor hitting top right of his head. Pt also reports abrasion on right knee. HISTORY OF PRESENT ILLNESS      The patient presents with a syncopal episode. He has had syncope before. The patient says he did not have any warning today. He was sitting with his friend and drinking coffee. His friend says that he passed out midsentence. The patient does have a history of syncope where he has had more warning and has been able to lower himself to the ground. This time, he did hit the left side of his head. The patient does take a blood thinner. He has a history of a prior stroke affecting the left side of his body and causing right field defect. He has lost the vision in the right visual field. None of this is new. He reports no headache. He denies neck pain. He denies any other injury. He denies tachycardia or chest pain. He denies nausea or vomiting. Nothing makes his symptoms better or worse otherwise. REVIEW OF SYSTEMS       All systems reviewed and negative unless noted in HPI. The patient denies fever or constitutional symptoms. Denies vision change. History of right visual field defect. Denies any sore throat or rhinorrhea. Denies any neck pain or stiffness. Denies chest pain or shortness of breath. No nausea,  vomiting or diarrhea. Denies any dysuria. Denies urinary frequency or hematuria. Injury to head. Denies other injury. Denies any weakness, numbness or focal neurologic deficit.   History of prior stroke with left-sided defect. Denies any skin rash or edema. No recent psychiatric issues. Takes blood thinners. Denies any polyuria, polydypsia or history of immunocompromise. PAST MEDICAL HISTORY    has a past medical history of CVA (cerebral vascular accident) ([facility] [address] Diabetes mellitus ([facility] [address] History of back surgery, Measles, and Osteoarthritis. SURGICAL HISTORY      has a past surgical history that includes back surgery and hernia repair. CURRENT MEDICATIONS       Previous Medications    AMLODIPINE (NORVASC) 10 MG TABLET    TAKE 1 TABLET DAILY    ASPIRIN 81 MG TABLET    Take 81 mg by mouth daily    ATORVASTATIN (LIPITOR) 40 MG TABLET    TAKE 1 TABLET DAILY    CHOLECALCIFEROL (VITAMIN D3) 5000 UNITS TABS    Take 5,000 Units by mouth daily    CLOPIDOGREL (PLAVIX) 75 MG TABLET    TAKE 1 TABLET DAILY    CYCLOBENZAPRINE (FLEXERIL) 10 MG TABLET    TAKE 1 TABLET THREE TIMES A DAY    DONEPEZIL (ARICEPT) 10 MG TABLET    TAKE 1 TABLET DAILY WITH SUPPER    HYDRALAZINE (APRESOLINE) 25 MG TABLET    TAKE 1 TABLET THREE TIMES A DAY    LISINOPRIL (PRINIVIL;ZESTRIL) 20 MG TABLET    TAKE 1 TABLET DAILY    MAGNESIUM OXIDE (MAG-OX) 400 MG TABLET    Take 400 mg by mouth daily    METFORMIN (GLUCOPHAGE) 500 MG TABLET    TAKE 1 TABLET TWICE A DAY    VITAMIN B-12 (CYANOCOBALAMIN) 1000 MCG TABLET    Take 1,000 mcg by mouth daily    VITAMIN E 400 UNIT CAPSULE    Take 400 Units by mouth daily       ALLERGIES     has No Known Allergies. FAMILY HISTORY     He indicated that his mother is alive. He indicated that his father is . He indicated that all of his three sisters are alive. He indicated that his brother is alive. He indicated that the status of his maternal grandmother is unknown.     family history includes Cataracts in his mother; Diabetes in his maternal grandmother; High Blood Pressure in his mother. SOCIAL HISTORY      reports that he quit smoking about 4 years ago. His smoking use included cigarettes.  He started smoking No evidence for acute intracranial hemorrhage or intracranial mass lesion. Moderate chronic microangiopathic ischemic disease. Mild generalized volume loss. XR CHEST PORTABLE   Final Result   No radiographic evidence of acute cardiopulmonary disease. CT HEAD WO CONTRAST (Final result)  Result time 02/01/21 13:44:17  Final result by Priti Fair MD (02/01/21 13:44:17)                Impression:      Stable study.  No significant change since prior head CT of 2019. Chronic encephalomalacia changes within the left occipital lobe and posterior   aspect of right temporal lobe. No evidence for acute intracranial hemorrhage or intracranial mass lesion. Moderate chronic microangiopathic ischemic disease. Mild generalized volume loss. Narrative:    EXAMINATION:   CT OF THE HEAD WITHOUT CONTRAST  2/1/2021 1:24 pm     TECHNIQUE:   CT of the head was performed without the administration of intravenous   contrast. Dose modulation, iterative reconstruction, and/or weight based   adjustment of the mA/kV was utilized to reduce the radiation dose to as low   as reasonably achievable. COMPARISON:   Head CT of 07/12/2019     HISTORY:   ORDERING SYSTEM PROVIDED HISTORY: syncope   TECHNOLOGIST PROVIDED HISTORY:     syncope   Reason for Exam: Syncope today with fall,abrasion to posterior skull. History   of old  stroke and memory loss. Acuity: Acute   Type of Exam: Initial     FINDINGS:   There is no acute  intracranial hemorrhage or intracranial mass lesion. No   mass effect, midline shift or extra-axial collection is noted.      There are moderate nonspecific foci of periventricular and subcortical   cerebral white matter hypodensity, most likely representing chronic   microangiopathic disease in this age group.  There are chronic   encephalomalacia changes within the left occipital lobe and posterior aspect   of right temporal lobe.  The brain parenchyma is otherwise 0.10 - 1.20 k/uL    Absolute Eos # 0.11 0.00 - 0.44 k/uL    Basophils Absolute 0.06 0.00 - 0.20 k/uL    Absolute Immature Granulocyte 0.05 0.00 - 0.30 k/uL    WBC Morphology NOT REPORTED     RBC Morphology NOT REPORTED     Platelet Estimate NOT REPORTED    Brain Natriuretic Peptide   Result Value Ref Range    Pro-BNP 24 <300 pg/mL    BNP Interpretation Pro-BNP Reference Range:    Basic Metabolic Panel   Result Value Ref Range    Glucose 160 (H) 70 - 99 mg/dL    BUN 18 8 - 23 mg/dL    CREATININE 1.08 0.70 - 1.20 mg/dL    Bun/Cre Ratio 17 9 - 20    Calcium 9.7 8.6 - 10.4 mg/dL    Sodium 142 135 - 144 mmol/L    Potassium 4.0 3.7 - 5.3 mmol/L    Chloride 102 98 - 107 mmol/L    CO2 27 20 - 31 mmol/L    Anion Gap 13 9 - 17 mmol/L    GFR Non-African American >60 >60 mL/min    GFR African American >60 >60 mL/min    GFR Comment          GFR Staging NOT REPORTED    Troponin   Result Value Ref Range    Troponin, High Sensitivity 21 0 - 22 ng/L    Troponin T NOT REPORTED <0.03 ng/mL    Troponin Interp NOT REPORTED    D-Dimer, Quantitative   Result Value Ref Range    D-Dimer, Quant <0.27 0.00 - 0.59 mg/L FEU   Protime-INR   Result Value Ref Range    Protime 11.6 11.5 - 14.2 sec    INR 0.9    APTT   Result Value Ref Range    PTT 22.6 (L) 23.9 - 33.8 sec   Urinalysis Reflex to Culture    Specimen: Urine voided   Result Value Ref Range    Color, UA NOT REPORTED YELLOW    Turbidity UA NOT REPORTED CLEAR    Glucose, Ur NEGATIVE NEGATIVE    Bilirubin Urine NEGATIVE NEGATIVE    Ketones, Urine NEGATIVE NEGATIVE    Specific Gravity, UA 1.025 1.010 - 1.025    Urine Hgb NEGATIVE NEGATIVE    pH, UA 6.0 5.0 - 6.0    Protein, UA NEGATIVE NEGATIVE    Urobilinogen, Urine Normal Normal    Nitrite, Urine NEGATIVE NEGATIVE    Leukocyte Esterase, Urine NEGATIVE NEGATIVE    Urinalysis Comments NOT REPORTED    Microscopic Urinalysis   Result Value Ref Range    -          WBC, UA 0 TO 4 0 - 4 /HPF    RBC, UA None 0 - 4 /HPF    Casts UA NOT REPORTED 0 - 2 /LPF    Crystals, UA NOT REPORTED None /HPF    Epithelial Cells UA None 0 - 5 /HPF    Renal Epithelial, UA NOT REPORTED 0 /HPF    Bacteria, UA None None    Mucus, UA 1+ (A) None    Trichomonas, UA NOT REPORTED None    Amorphous, UA NOT REPORTED None    Other Observations UA NOT REPORTED NOT REQ. Yeast, UA NOT REPORTED None   EKG 12 Lead   Result Value Ref Range    Ventricular Rate 94 BPM    Atrial Rate 94 BPM    P-R Interval 188 ms    QRS Duration 92 ms    Q-T Interval 350 ms    QTc Calculation (Bazett) 437 ms    P Axis 49 degrees    R Axis -37 degrees    T Axis 32 degrees         EMERGENCY DEPARTMENT COURSE:   Vitals:    Vitals:    02/01/21 1300 02/01/21 1315 02/01/21 1337 02/01/21 1400   BP: 117/85 124/78 122/87 113/80   Pulse: 90 97 94 92   Resp: 19 24 20 21   Temp:       TempSrc:       SpO2: 96% 95% 96% 97%   Weight:       Height:         -------------------------  BP: 113/80, Temp: 96 °F (35.6 °C), Pulse: 92, Resp: 21      Re-evaluation Notes    The patient has had no evidence of arrhythmia or syncope while here. My index of suspicion for ACS or PE is low. There is no evidence of CVA. I think he can get a Holter as an outpatient. I have also given referral to a cardiologist.  The patient is discharged in good condition. FINAL IMPRESSION      1.  Syncope and collapse          DISPOSITION/PLAN   DISPOSITION        Condition on Disposition    good    PATIENT REFERRED TO:  Henok Johnson MD  [facility] [address]  [place] [phone]    In 3 days      Salina Rob DO  [phone]    In 3 days        DISCHARGE MEDICATIONS:  New Prescriptions    No medications on file       (Please note that portions of this note were completed with a voice recognition program.  Efforts were made to edit the dictations but occasionally words are mis-transcribed.)    Ivey MD   Attending Emergency Physician         Jeane Lane MD  02/01/21 [address] [place] [address]

## 2021-03-29 ENCOUNTER — OFFICE VISIT (OUTPATIENT)
Dept: NEUROLOGY | Age: 66
End: 2021-03-29
Payer: MEDICARE

## 2021-03-29 VITALS — HEART RATE: 100 BPM | DIASTOLIC BLOOD PRESSURE: 88 MMHG | OXYGEN SATURATION: 97 % | SYSTOLIC BLOOD PRESSURE: 130 MMHG

## 2021-03-29 DIAGNOSIS — I63.89 INFARCTION OF TEMPORAL LOBE (HCC): ICD-10-CM

## 2021-03-29 DIAGNOSIS — Z98.890 HISTORY OF BACK SURGERY: ICD-10-CM

## 2021-03-29 DIAGNOSIS — I63.9 OCCIPITAL CEREBRAL INFARCTION (HCC): ICD-10-CM

## 2021-03-29 DIAGNOSIS — E11.49 TYPE 2 DIABETES MELLITUS WITH OTHER NEUROLOGIC COMPLICATION, WITHOUT LONG-TERM CURRENT USE OF INSULIN (HCC): ICD-10-CM

## 2021-03-29 DIAGNOSIS — M54.40 LOW BACK PAIN WITH SCIATICA, SCIATICA LATERALITY UNSPECIFIED, UNSPECIFIED BACK PAIN LATERALITY, UNSPECIFIED CHRONICITY: ICD-10-CM

## 2021-03-29 DIAGNOSIS — E78.5 HYPERLIPIDEMIA, UNSPECIFIED HYPERLIPIDEMIA TYPE: ICD-10-CM

## 2021-03-29 DIAGNOSIS — I63.00 CEREBROVASCULAR ACCIDENT (CVA) DUE TO THROMBOSIS OF PRECEREBRAL ARTERY (HCC): ICD-10-CM

## 2021-03-29 DIAGNOSIS — E78.2 MIXED HYPERLIPIDEMIA: ICD-10-CM

## 2021-03-29 DIAGNOSIS — F03.90 DEMENTIA WITHOUT BEHAVIORAL DISTURBANCE, UNSPECIFIED DEMENTIA TYPE: ICD-10-CM

## 2021-03-29 DIAGNOSIS — K21.9 GASTROESOPHAGEAL REFLUX DISEASE WITHOUT ESOPHAGITIS: ICD-10-CM

## 2021-03-29 DIAGNOSIS — Z79.4 DIABETES MELLITUS DUE TO UNDERLYING CONDITION WITH HYPEROSMOLARITY AND COMA, WITH LONG-TERM CURRENT USE OF INSULIN (HCC): ICD-10-CM

## 2021-03-29 DIAGNOSIS — H53.461 RIGHT HOMONYMOUS HEMIANOPSIA: ICD-10-CM

## 2021-03-29 DIAGNOSIS — H35.033 HYPERTENSIVE RETINOPATHY OF BOTH EYES: ICD-10-CM

## 2021-03-29 DIAGNOSIS — E08.01 DIABETES MELLITUS DUE TO UNDERLYING CONDITION WITH HYPEROSMOLARITY AND COMA, WITH LONG-TERM CURRENT USE OF INSULIN (HCC): ICD-10-CM

## 2021-03-29 DIAGNOSIS — I10 ESSENTIAL HYPERTENSION: ICD-10-CM

## 2021-03-29 DIAGNOSIS — R55 SYNCOPE, UNSPECIFIED SYNCOPE TYPE: Primary | ICD-10-CM

## 2021-03-29 DIAGNOSIS — R41.3 MEMORY LOSS: ICD-10-CM

## 2021-03-29 PROCEDURE — 1036F TOBACCO NON-USER: CPT | Performed by: PSYCHIATRY & NEUROLOGY

## 2021-03-29 PROCEDURE — G8484 FLU IMMUNIZE NO ADMIN: HCPCS | Performed by: PSYCHIATRY & NEUROLOGY

## 2021-03-29 PROCEDURE — 3046F HEMOGLOBIN A1C LEVEL >9.0%: CPT | Performed by: PSYCHIATRY & NEUROLOGY

## 2021-03-29 PROCEDURE — 99215 OFFICE O/P EST HI 40 MIN: CPT | Performed by: PSYCHIATRY & NEUROLOGY

## 2021-03-29 PROCEDURE — G8417 CALC BMI ABV UP PARAM F/U: HCPCS | Performed by: PSYCHIATRY & NEUROLOGY

## 2021-03-29 PROCEDURE — 3017F COLORECTAL CA SCREEN DOC REV: CPT | Performed by: PSYCHIATRY & NEUROLOGY

## 2021-03-29 PROCEDURE — 2022F DILAT RTA XM EVC RTNOPTHY: CPT | Performed by: PSYCHIATRY & NEUROLOGY

## 2021-03-29 PROCEDURE — G8427 DOCREV CUR MEDS BY ELIG CLIN: HCPCS | Performed by: PSYCHIATRY & NEUROLOGY

## 2021-03-29 PROCEDURE — 99214 OFFICE O/P EST MOD 30 MIN: CPT | Performed by: PSYCHIATRY & NEUROLOGY

## 2021-03-29 SDOH — HEALTH STABILITY: MENTAL HEALTH: HOW OFTEN DO YOU HAVE A DRINK CONTAINING ALCOHOL?: NEVER

## 2021-03-29 SDOH — HEALTH STABILITY: MENTAL HEALTH: HOW MANY STANDARD DRINKS CONTAINING ALCOHOL DO YOU HAVE ON A TYPICAL DAY?: NOT ASKED

## 2021-03-29 ASSESSMENT — ENCOUNTER SYMPTOMS
SINUS PRESSURE: 0
NAUSEA: 0
VISUAL CHANGE: 1
SHORTNESS OF BREATH: 0
TROUBLE SWALLOWING: 0
ABDOMINAL DISTENTION: 0
SWOLLEN GLANDS: 0
COUGH: 0
BOWEL INCONTINENCE: 0
BACK PAIN: 1
SORE THROAT: 0
DIARRHEA: 0
WHEEZING: 0
EYE DISCHARGE: 0
CHOKING: 0
EYE ITCHING: 0
EYE REDNESS: 0
CONSTIPATION: 0
COLOR CHANGE: 0
CHEST TIGHTNESS: 0
VOICE CHANGE: 0
FACIAL SWELLING: 0
EYE PAIN: 0
CHANGE IN BOWEL HABIT: 0
PHOTOPHOBIA: 0
BLOOD IN STOOL: 0
VOMITING: 0
APNEA: 0
ABDOMINAL PAIN: 0

## 2021-03-29 NOTE — PROGRESS NOTES
This writer contacted scheduling to schedule the following testinD Echo, 48hr Holter Monitor, EEG, Carotid Doppler and TCD - patient made aware, will contact office with further needs.

## 2021-03-29 NOTE — PROGRESS NOTES
[facility]  Neurology  [address]:[phone]   Fax: [phone]        SUBJECTIVE:     PATIENT ID:  Rona Mckeon is a   LEFT     HANDED 63 y.o. male. Cerebrovascular Accident  This is a chronic problem. Episode onset: SEPT. 2016   The problem has been unchanged. Associated symptoms include numbness and a visual change. Pertinent negatives include no abdominal pain, anorexia, arthralgias, change in bowel habit, chest pain, chills, congestion, coughing, diaphoresis, fatigue, fever, headaches, joint swelling, myalgias, nausea, neck pain, rash, sore throat, swollen glands, urinary symptoms, vertigo, vomiting or weakness. Nothing aggravates the symptoms. Treatments tried: ASA   AND PLAVIX   The treatment provided moderate relief. Neurologic Problem  The patient's primary symptoms include clumsiness, focal sensory loss, a loss of balance, memory loss and a visual change. The patient's pertinent negatives include no altered mental status, focal weakness, near-syncope, slurred speech, syncope or weakness. This is a chronic problem. Episode onset: MORE  THAN   2-3  YEARS. The neurological problem developed insidiously. There was lower extremity, left-sided and right-sided focality noted. Associated symptoms include back pain. Pertinent negatives include no abdominal pain, auditory change, aura, bladder incontinence, bowel incontinence, chest pain, confusion, diaphoresis, dizziness, fatigue, fever, headaches, light-headedness, nausea, neck pain, palpitations, shortness of breath, vertigo or vomiting. Past treatments include medication, bed rest and sleep. The treatment provided mild relief. His past medical history is significant for a CVA and dementia. There is no history of a bleeding disorder, a clotting disorder, head trauma, liver disease, mood changes or seizures.            History obtained from  The patient     And   HIS  WIFE       and other  available   medical  records   were  Also  reviewed. The  Duration,  Quality,  Severity,  Location,  Timing,  Context,  Modifying  Factors   Of   The   Chief   Complaint       And  Present  Illness  Was   Reviewed   In   Chronological   [facility]. PATIENT'S  MAIN  CONCERNS INCLUDE :                     1)     H/O    OLD    STROKE  IN     2016           WITH                                       HOSPITALIZATION  IN  [place]                             2)    PATIENT  WAS    REPORTED  TO    UNREPONSIVE                                       DUE  TO  CARDIAC    PROBLEMS                                 3 )    MRI  BRAIN   IN    SEPT. 2016     SHOWED   :                               A)   BILATERAL  TEMPORAL  INFARCTS                                 B)   OLD  LEFT  OCCIPITAL INFARCT                               4)      H/O   POST  STROKE     MEMORY  AND  COGNITIVE  PROBLEMS                                              SINCE     2016                                    -  ON  ARICEPT    5  MG  DAILY                             5)      RESIDUAL   RIGHT     VISUAL   FIELD  DEFECTS                                              DUE  TO  PREVIOUS  STROKE                               6)     CURRENTLY  ON   ASA  AND PLAVIX                                         PROPHYLACTIC ALLY           AND                                    TOLERATING  THE  SAME.                                7)     MULTIPLE  CO  MORBID  MEDICAL  CONDITIONS                                 BEING  FOLLOWED  BY  HIS  PCP                                8)     DIABETIC  PERIPHERAL  POLYNEUROPATHY                                               MOSTLY   BOTH  LOWER  EXTREMITIES                                 9)   H/O   CHRONIC  LUMBAR  PAIN                            PREVIOUS  H/O   LUMBAR  DISC   SURGERIES                                    10)     MILD    BALANCE PROBLEMS -     STABLE                              11)    H/O  CHRONIC    MILD  ANXIETY. PATIENT  DENIES  ANY  DEPRESSIVE  SYMPTOMS                              12)     PREVIOUS  H/O   SMOKING. PATIENT  QUIT  SMOKING  SINCE     SEPT. 2016                                                                 13)        HAD    NEURO  DIAGNOSTIC  EVALUATIONS  IN  July 2019                           A)   CT   HEAD  SHOWED   NO  ACUTE  PATHOLOGY                                           REMOTE      STROKES   -   REMAIN    UN  CHANGED                           B)    CAROTID  DOPPLER,  EEG  ,  LABS  SHOW   NO  SIGNIFICANT                                            ABNORMALITIES                                                             14)          AS   DISCUSSED    WITH  PATIENT   AND  HIS  WIFE                                PATIENT     STARTED    ON       ARICEPT    10  MG  PO   LAMB   WITH  SUPPER                                       IN   SEPT. 2019                                     PATIENT  TOLERATING  THE  SAME  AND  GETTING  BENEFIT. EXPECTATIONS,   GOALS   AND  SIDE  EFFECTS  MEDICATION    WERE                                 REVIEWED     AND   DISCUSSED    IN    DETAIL.                               15)      H / O     SYNCOPE     IN     FEB. 2021                              PATIENT   WOKE    UP    ON     FLOOR      AS   NOTICED  BY   HIS  WIFE                                   D /  D  INCLUDE                                              CARDIAC    ARRHYTHMIA,    VASOVAGAL                                                 SEIZURES,   MEDICATION    EFFECTS                                                 AND   OTHER    ETIOLOGIES                             16)     PATIENT  HAD     ER   VISIT      AND    EVALUATIONS                                  CT   HEAD  ON    02/01/2021       SHOWED A)      NO   ACUTE  PATHOLOGY                                    B)    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 PATIENT   HAS  MULTIPLE   MEDICAL, NEUROLOGICAL      PROBLEMS                     PATIENT'S   MANAGEMENT  IS  CHALLENGING.                                     PRECIPITATING  FACTORS: including  fever/infection, exertion/relaxation, position change,                        stress, weather change,                        medications/alcohol, time of day/darkness/light  Are  absent                                                                MODIFYING  FACTORS:  fever/infection, exertion/relaxation, position change, stress, weather change,                        medications/alcohol, time of day/darkness/light  Are  absent             Patient   Indicates   The  Presence   And  The  Absence  Of  The  Following    Associated  And          Additional  Neurological    Symptoms:                                Balance  And coordination   problems  present           Gait problems     absent            Headaches      absent              Migraines           absent           Memory problemspresent              Confusion        absent            Paresthesia   numbness          present           Seizures  And  Starring  Episodes           absent           Syncope,  Near  syncopal episodes         absent           Speech   problems           absent             Swallowing   Problems      absent            Dizziness,  Light headedness           absent              Vertigo        absent             Generalized   Weakness    absent              focal  Weakness     absent             Tremors         absent              Sleep  Problems     absent             History  Of   Recent  Head  Injury     absent             History  Of   Recent  TIA     absent             History  Of   Recent    Stroke     absent             Neck  Pain   and   Neck muscle  Spasms  absent               Radiating  down   And   Weakness           absent            Lower back   Pain  And     Spasms  present              Radiating    Down   And   Weakness          present                H/O    FALLS        absent               History  Of   Visual  Symptoms   PRESENT                    Associated   Diplopia       absent                                               Also   Additional   Symptoms   Present    As  Documented    In   The   detailed                  Review  Of  Systems   And    Please   Refer   To    Them for   Additional    Information. Any components  That are either  Unobtainable  Or  Limited  In   HPI, ROS  And/or PFSH   Are                   Due   ToPatient's  Medical  Problems,  Clinical  Condition   and/or lack of                                other    Alternate   resources.           RECORDS   REVIEWED:    historical medical records       INFORMATION   REVIEWED:     MEDICAL   HISTORY,SURGICAL   HISTORY,     MEDICATIONS   LIST,   ALLERGIES AND  DRUG  INTOLERANCES,       FAMILY   HISTORY,  SOCIAL  HISTORY,      PROBLEM  LIST   FOR  PATIENT  CARE   COORDINATION      Past Medical History:   Diagnosis Date    [facility]     Diabetes mellitus [facility] [facility]     History of back surgery     Measles     Osteoarthritis          Past Surgical History:   Procedure Laterality Date    BACK SURGERY      HERNIA REPAIR           Current Outpatient Medications   Medication Sig Dispense Refill    donepezil (ARICEPT) 10 MG tablet TAKE 1 TABLET DAILY WITH SUPPER 90 tablet 1    amLODIPine (NORVASC) 10 MG tablet TAKE 1 TABLET DAILY 90 tablet 3    metFORMIN (GLUCOPHAGE) 500 MG tablet TAKE 1 TABLET TWICE A  tablet 3    lisinopril (PRINIVIL;ZESTRIL) 20 MG tablet TAKE 1 TABLET DAILY 90 tablet 3    clopidogrel (PLAVIX) 75 MG tablet TAKE 1 TABLET DAILY 90 tablet 3    atorvastatin (LIPITOR) 40 MG tablet TAKE 1 TABLET DAILY 90 tablet 3    cyclobenzaprine (FLEXERIL) 10 MG tablet TAKE 1 TABLET THREE TIMES A  tablet 3    aspirin 81 MG tablet Take 81 mg by mouth daily      vitamin B-12 (CYANOCOBALAMIN) 1000 MCG tablet Take 1,000 mcg by mouth daily      Cholecalciferol (VITAMIN D3) 5000 units TABS Take 5,000 Units by mouth daily      vitamin E 400 UNIT capsule Take 400 Units by mouth daily      magnesium oxide (MAG-OX) 400 MG tablet Take 400 mg by mouth daily      hydrALAZINE (APRESOLINE) 25 MG tablet TAKE 1 TABLET THREE TIMES A  tablet 3     Current Facility-Administered Medications   Medication Dose Route Frequency Provider Last Rate Last Admin    phenylephrine (MYDFRIN) 2.5 % ophthalmic solution 1 drop  1 drop Both Eyes Once Emanuel Trammell MD        tropicamide (MYDRIACYL) 1 % ophthalmic solution 1 drop  1 drop Both Eyes Once Emanuel Trammell MD             No Known Allergies      Family History   Problem Relation Age of Onset    High Blood Pressure Mother     Cataracts Mother     Diabetes Maternal Grandmother          Social History     Socioeconomic History    Marital status:      Spouse name: Not on file    Number of children: Not on file    Years of education: Not on file    Highest education level: Not on file   Occupational History    Not on file   Social Needs    Financial resource strain: Not on file    Food insecurity     Worry: Not on file     Inability: Not on file    Transportation needs     Medical: Not on file     Non-medical: Not on file   Tobacco Use    Smoking status: Former Smoker Packs/day: 2.00     Years: 30.00     Pack years: 60.00     Types: Cigarettes     Start date: 14     Quit date: 2016     Years since quittin.8    Smokeless tobacco: Never Used   Substance and Sexual Activity    Alcohol use: Never     Frequency: Never     Binge frequency: Never    Drug use: Not on file    Sexual activity: Not on file   Lifestyle    Physical activity     Days per week: Not on file     Minutes per session: Not on file    Stress: Not on file   Relationships    Social connections     Talks on phone: Not on file     Gets together: Not on file     Attends Evangelical service: Not on file     Active member of club or organization: Not on file     Attends meetings of clubs or organizations: Not on file     Relationship status: Not on file    Intimate partner violence     Fear of current or ex partner: Not on file     Emotionally abused: Not on file     Physically abused: Not on file     Forced sexual activity: Not on file   Other Topics Concern    Not on file   Social History Narrative    Not on file       Vitals:    21 1538   BP: 130/88   Pulse: 100   SpO2: 97%         Wt Readings from Last 3 Encounters:   21 200 lb (90.7 kg)   20 222 lb 3.2 oz (100.8 kg)   20 224 lb 12.8 oz (102 kg)         BP Readings from Last 3 Encounters:   21 130/88   21 114/86   20 134/72       Chemistries  Lab Results   Component Value Date     2021    K 4.0 2021     2021    CO2 27 2021    BUN 18 2021    CREATININE 1.08 2021    CALCIUM 9.7 2021    PROT 7.0 2019    LABALBU 4.5 2019    BILITOT 0.47 2019    ALKPHOS 67 2019    AST 13 2019    ALT 19 2019     Lab Results   Component Value Date    ALKPHOS 67 2019    ALT 19 2019    AST 13 2019    PROT 7.0 2019    BILITOT 0.47 2019    LABALBU 4.5 2019     Lab Results   Component Value Date    BUN 18 2021 CREATININE 1.08 02/01/2021     Lab Results   Component Value Date    CALCIUM 9.7 02/01/2021    MG 1.9 07/02/2019     Lab Results   Component Value Date    AST 13 07/02/2019    ALT 19 07/02/2019     Lab Results   Component Value Date    QPMIHGLM65 711 07/08/2019           Review of Systems   Constitutional: Negative for appetite change, chills, diaphoresis, fatigue, fever and unexpected weight change. HENT: Negative for congestion, dental problem, drooling, ear discharge, ear pain, facial swelling, hearing loss, mouth sores, nosebleeds, postnasal drip, sinus pressure, sore throat, tinnitus, trouble swallowing and voice change. Eyes: Positive for visual disturbance. Negative for photophobia, pain, discharge, redness and itching. Respiratory: Negative for apnea, cough, choking, chest tightness, shortness of breath and wheezing. Cardiovascular: Negative for chest pain, palpitations, leg swelling and near-syncope. Gastrointestinal: Negative for abdominal distention, abdominal pain, anorexia, blood in stool, bowel incontinence, change in bowel habit, constipation, diarrhea, nausea and vomiting. Endocrine: Negative for cold intolerance, heat intolerance, polydipsia, polyphagia and polyuria. Genitourinary: Negative for bladder incontinence. Musculoskeletal: Positive for back pain. Negative for arthralgias, gait problem, joint swelling, myalgias, neck pain and neck stiffness. Skin: Negative for color change, pallor, rash and wound. Allergic/Immunologic: Negative for environmental allergies, food allergies and immunocompromised state. Neurological: Positive for numbness and loss of balance. Negative for dizziness, vertigo, tremors, focal weakness, seizures, syncope, facial asymmetry, speech difficulty, weakness, light-headedness and headaches. Hematological: Negative for adenopathy. Does not bruise/bleed easily. Psychiatric/Behavioral: Positive for decreased concentration and memory loss. Negative for agitation, behavioral problems, confusion, dysphoric mood, hallucinations, self-injury, sleep disturbance and suicidal ideas. The patient is nervous/anxious. The patient is not hyperactive. OBJECTIVE:    Physical Exam  Constitutional:       Appearance: He is well-developed. HENT:      Head: Normocephalic and atraumatic. No raccoon eyes or Adame's sign. Right Ear: External ear normal.      Left Ear: External ear normal.      Nose: Nose normal.   Eyes:      Conjunctiva/sclera: Conjunctivae normal.   Neck:      Musculoskeletal: Normal range of motion and neck supple. Normal range of motion. No neck rigidity or muscular tenderness. Thyroid: No thyroid mass or thyromegaly. Vascular: No carotid bruit. Trachea: No tracheal deviation. Meningeal: Brudzinski's sign and Kernig's sign absent. Cardiovascular:      Rate and Rhythm: Normal rate and regular rhythm. Pulmonary:      Effort: Pulmonary effort is normal.   Musculoskeletal:         General: No tenderness. Skin:     General: Skin is warm. Coloration: Skin is not pale. Findings: No erythema or rash. Nails: There is no clubbing. Psychiatric:         Attention and Perception: He is attentive. Mood and Affect: Mood is anxious. Mood is not depressed. Affect is not labile, blunt, angry or inappropriate. Speech: Speech is delayed. Behavior: Behavior is slowed. Behavior is not agitated, aggressive, withdrawn, hyperactive or combative. Behavior is cooperative. Thought Content: Thought content is not paranoid or delusional. Thought content does not include homicidal or suicidal ideation. Thought content does not include homicidal or suicidal plan. Cognition and Memory: Cognition is impaired. Memory is impaired. He exhibits impaired recent memory. He does not exhibit impaired remote memory. Judgment: Judgment is not impulsive or inappropriate.          Neurologic Exam      NEUROLOGICALEXAMINATION :       A) MENTAL STATUS:                   Alert and  oriented  To time, place  And  Person. No Aphasia. No  Dysarthria. Able   To  Follow   SIMPLE     commands   without   Any  Difficulty. No right  To left confusion. Normal  Speech  And language function. Insight and  Judgment ,Fund  Of  Knowledge   within normal limits                Recent  And  Remote memory   DECREASED                Attention &  Concentration are    DECREASED                                                B) CRANIAL NERVES :      CN : Visual  Acuity  And  Visual fields    RESIDUAL  RIGHT  VISUAL  FIELD  IMPAIREMENTS               Fundi  Could  Not  Be  Could  Not  Be  Evaluated. 3,4,6 CN : Both  Pupils are   Reactive and  Equal.  Movements  Are  Intact. No  Nystagmus. No  CRISTIAN. No  Afferent  Pupillary  Defect noted. 5 CN :  Normal  Facial sensations and Corneal  Reflexes           7 CN:  Normal  Facial  Symmetry  And  Strength. No facial  Weakness. 8 CN :  Hearing  Appears within normal limits          9, 10 CN: Normal   spontaneous, reflex   palate   movements         11 CN:   Normal  Shoulder  shrug and  strength         12 CN :   Normal  Tongue movements and  Tongue  In midline                        No tongue   Fasciculations or atrophy       C) MOTOR  EXAM:                 Strength  In upper  And  Lower   extremities   within   normal limits               No  Drift. No  Atrophy               Rapid   alternating  And  repetitions  Movements  within   normal limits               Muscle  Tone  In upper  And  Lower  Extremities  normal                No rigidity. No  Spasticity. Bradykinesia   absent               No  Asterixis.               Sustention  Tremor , Resting   Tremor   absent                    No   other  Abnormal  Movements noted effect, midline shift or extra-axial collection is noted.       There are moderate nonspecific foci of periventricular and subcortical   cerebral white matter hypodensity, most likely representing chronic   microangiopathic disease in this age group.  There are chronic   encephalomalacia changes within the left occipital lobe and posterior aspect   of right temporal lobe.  The brain parenchyma is otherwise normal. The   cerebellar tonsils are in normal position.       The ventricles, sulci, and cisterns are mildly prominent suggestive of mild   generalized volume loss.       The globes and orbits are within normal limits.  Calcified sebaceous cyst   within the subcutaneous tissues of central occipital region.  The visualized   extracranial structures including paranasal sinuses and mastoid air cells are   unremarkable. No fracture is identified.           Impression       Stable study.  No significant change since prior head CT of 2019.       Chronic encephalomalacia changes within the left occipital lobe and posterior   aspect of right temporal lobe.       No evidence for acute intracranial hemorrhage or intracranial mass lesion.       Moderate chronic microangiopathic ischemic disease.       Mild generalized volume loss.            CT OF THE HEAD WITHOUT CONTRAST  7/12/2019 4:17 pm       TECHNIQUE:   CT of the head was performed without the administration of intravenous   contrast. Dose modulation, iterative reconstruction, and/or weight based   adjustment of the mA/kV was utilized to reduce the radiation dose to as low   as reasonably achievable.       COMPARISON:   None.       HISTORY:   ORDERING SYSTEM PROVIDED HISTORY: Right homonymous hemianopsia   TECHNOLOGIST PROVIDED HISTORY:   history of CVA   Reason for Exam: Balance and memory loss with vision changes.  History of CVA   1 year ago   Acuity: Chronic   Type of Exam: Initial       FINDINGS:   BRAIN/VENTRICLES: There is no acute intracranial hemorrhage, mass effect or   midline shift.  No abnormal extra-axial fluid collection.  The gray-white   differentiation is maintained without evidence of an acute infarct.  There is   no evidence of hydrocephalus. Old ischemic strokes with resultant   encephalomalacia in the left occipital lobe and posterior aspect of right   temporal lobe.       ORBITS: The visualized portion of the orbits demonstrate no acute abnormality.       SINUSES: The visualized paranasal sinuses and mastoid air cells demonstrate   no acute abnormality.       SOFT TISSUES/SKULL:  No acute abnormality of the visualized skull or soft   tissues.  Small sebaceous cyst with calcifications in the subcutaneous fat of   the posterior parietal scalp along the midline.           Impression   No acute intracranial abnormality.       Old ischemic strokes with resultant encephalomalacia in the left occipital   lobe and posterior aspect of right temporal lobe.             History:  Stroke    Exam/Technique:  Multiplanar multisequence images were obtained without the use of contrast.    Comparison:  Cerebral MRI from September 29, 2016    Kash Unger is no evidence of recent infarcts on diffusion-weighted images. There is an old left occipital cortical infarct medially, and  smaller areas of old infarction in the mesial temporal cortex bilaterally. These were recent time of the previous MRI There are also scattered punctate areas of increased T2 signal in the   deep supratentorial white matter that are consistent with chronic small vessel ischemic change. This also one 3 mm diameter fluid intensity area in the left cerebellar white matter that is consistent with an old lacunar infarct. Ventricles and CSF spaces are bilaterally symmetric and within normal limits. Gray-white matter differentiation is normal throughout. Incidental note is made of an apparent sedation cyst along the occipital portion of the scalp.  There is some fluid content in mastoid air cells bilaterally. Otherwise, no gross abnormalities are displayed in the bones or soft tissues of the skull base. IMPRESSION:  Old left occipital and bilateral temporal infarcts, and additional chronic abnormalities. No evidence of recent infarcts or other additional intracranial abnormalities. Finalized by Jada Eller MD on 12/20/2016 2:23 PM      ULTRASOUND EVALUATION OF THE CAROTID ARTERIES       7/12/2019       COMPARISON:   None.       HISTORY:   ORDERING SYSTEM PROVIDED HISTORY: Right homonymous hemianopsia   TECHNOLOGIST PROVIDED HISTORY:   history of CVA   Reason for Exam: right homonymous hemianopsia   Acuity: Chronic   Type of Exam: Subsequent/Follow-up       FINDINGS:       RIGHT:       The right common carotid artery demonstrates peak systolic velocities of 65,   61 cm/sec in the proximal and distal segments respectively.       The right internal carotid artery demonstrates the systolic velocities of 74,   60, 38 cm/sec in the proximal, mid and distal segments respectively.       The external carotid artery is patent.  The vertebral artery demonstrates   normal antegrade flow.       No evidence of focal atherosclerotic plaque.       ICA/CCA ratio of 1.1.           LEFT:       The left common carotid artery demonstrates peak systolic velocities of 67,   70 cm/sec in the proximal and distal segments respectively.       The left internal carotid artery demonstrates the systolic velocities of 43,   50, 50 cm/sec in the proximal, mid and distal segments respectively.       The external carotid artery is patent.  The vertebral artery demonstrates   normal antegrade flow.       A focus of mild smooth plaque is noted at the origin of the internal carotid   artery.  ICA/CCA ratio of 0.7.           Impression   The right internal carotid artery demonstrates 0-50% stenosis.       The left internal carotid artery demonstrates 0-50% stenosis.       Bilateral vertebral arteries are patent with flow in the normal direction.             VISITING DIAGNOSIS:        ICD-10-CM    1. Syncope, unspecified syncope type  R55 ECHO Complete 2D W Doppler W Color     Holter Monitor 48 Hour     EEG     VL DUP CAROTID BILATERAL     VL TRANSCRANIAL DOPPLER COMPLETE     [facility], [place]   2. Dementia without behavioral disturbance, unspecified dementia type (HCC)  F03.90 ECHO Complete 2D W Doppler W Color     Holter Monitor 48 Hour     EEG     VL DUP CAROTID BILATERAL     VL TRANSCRANIAL DOPPLER COMPLETE   3. Low back pain with sciatica, sciatica laterality unspecified, unspecified back pain laterality, unspecified chronicity  M54.40 ECHO Complete 2D W Doppler W Color     Holter Monitor 48 Hour     EEG     VL DUP CAROTID BILATERAL     VL TRANSCRANIAL DOPPLER COMPLETE   4. Occipital cerebral infarction (formerly [facility])  I63.9 ECHO Complete 2D W Doppler W Color     Holter Monitor 48 Hour     EEG     VL DUP CAROTID BILATERAL     VL TRANSCRANIAL DOPPLER COMPLETE   5. Infarction of temporal lobe (formerly [facility])  I63.89 ECHO Complete 2D W Doppler W Color     Holter Monitor 48 Hour     EEG     VL DUP CAROTID BILATERAL     VL TRANSCRANIAL DOPPLER COMPLETE   6. Hypertensive retinopathy of both eyes  H35.033 ECHO Complete 2D W Doppler W Color     Holter Monitor 48 Hour     EEG     VL DUP CAROTID BILATERAL     VL TRANSCRANIAL DOPPLER COMPLETE   7. History of back surgery  Z98.890 ECHO Complete 2D W Doppler W Color     Holter Monitor 48 Hour     EEG     VL DUP CAROTID BILATERAL     VL TRANSCRANIAL DOPPLER COMPLETE   8. Mixed hyperlipidemia  E78.2 ECHO Complete 2D W Doppler W Color     Holter Monitor 48 Hour     EEG     VL DUP CAROTID BILATERAL     VL TRANSCRANIAL DOPPLER COMPLETE   9. Memory loss  R41.3 ECHO Complete 2D W Doppler W Color     Holter Monitor 48 Hour     EEG     VL DUP CAROTID BILATERAL     VL TRANSCRANIAL DOPPLER COMPLETE   10.  Type 2 diabetes mellitus with other neurologic complication, without long-term current use of insulin ([facility])  E11.49 ECHO Complete 2D W Doppler W Color     Holter Monitor 48 Hour     EEG     VL DUP CAROTID BILATERAL     VL TRANSCRANIAL DOPPLER COMPLETE   11. Essential hypertension  I10 ECHO Complete 2D W Doppler W Color     Holter Monitor 48 Hour     EEG     VL DUP CAROTID BILATERAL     VL TRANSCRANIAL DOPPLER COMPLETE   12. Cerebrovascular accident (CVA) due to thrombosis of precerebral artery ([facility])  I63.00 ECHO Complete 2D W Doppler W Color     Holter Monitor 48 Hour     EEG     VL DUP CAROTID BILATERAL     VL TRANSCRANIAL DOPPLER COMPLETE   13. Right homonymous hemianopsia  H53.461 ECHO Complete 2D W Doppler W Color     Holter Monitor 48 Hour     EEG     VL DUP CAROTID BILATERAL     VL TRANSCRANIAL DOPPLER COMPLETE   14. Diabetes mellitus due to underlying condition with hyperosmolarity and coma, with long-term current use of insulin ([facility])  E08.01 ECHO Complete 2D W Doppler W Color    Z79.4 Holter Monitor 48 Hour     EEG     VL DUP CAROTID BILATERAL     VL TRANSCRANIAL DOPPLER COMPLETE   15. Gastroesophageal reflux disease without esophagitis  K21.9 ECHO Complete 2D W Doppler W Color     Holter Monitor 48 Hour     EEG     VL DUP CAROTID BILATERAL     VL TRANSCRANIAL DOPPLER COMPLETE   16. Hyperlipidemia, unspecified hyperlipidemia type  E78.5 ECHO Complete 2D W Doppler W Color     Holter Monitor 48 Hour     EEG     VL DUP CAROTID BILATERAL     VL TRANSCRANIAL DOPPLER COMPLETE              CONCERNS   &   INCREASED   RISK   FOR         * TIA,  CEREBRO  VASCULAR  ISCHEMIA,STROKE      *   COGNITIVE  &   MEMORY PROBLEMS  AND  DEMENTIAS         *   PERIPHERAL  NEUROPATHY,        *   BALANCE PROBLMES                 VARIOUS  RISK   FACTORS   WERE  REVIEWED   AND   DISCUSSED. *  PATIENT   HAS  MULTIPLE   MEDICAL, NEUROLOGICAL      PROBLEMS         PATIENT'S   MANAGEMENT  IS  CHALLENGING.             PLAN:                         Apolinar Gonzalez  Of  The  Diagnoses,  The  Management & Treatment  Options AND    Care  plan  Were          Reviewed and   Discussed   With  patient. * Goals  And  Expectations  Of  The  Therapy  Discussed   And  Reviewed. *   Benefits   And   Side  Effect  Profile  Of  Medication/s   Were   Discussed                * Need   For  Further   Follow up For  The  Various  Problems Were  discussed. * Results  Of  The  Previous  Diagnostic tests were reviewed and  discussed                 Medical  Decision  Making  Was  Made  Based on the   Complexity  Of  Above  Mentioned  Diagnoses,    Data reviewed             And    Risk  Of  Significant   Co morbidities and   complicating   Factors. Medical  Decision  Was   High    Complexity   Due   To  The  Patient's  Multiple  Symptoms,      Advancing   Disease,  Complex  Treatment  Regimen,  Multiple medications           and   Risk  Of   Side  Effects,  Difficulty  In  Medication  Management  And  Diagnostic  Challenges       In  View  Of  The  Associated   Co  Morbid  Conditions   And  Problems. * FALL   PRECAUTIONS. THESE  REVIEWED   AND  DISCUSSED      *   ABSOLUTELY   NO  DRIVING        *   BE  CAREFUL  WITH  ACTIVITIES            *   AVOID   BACK  STRAINING  ACTIVITIES          *   ADEQUATE   FLUIDINTAKE   AND  ELECTROLYTE  BALANCE         * KEEP  DAIRY  OF   THE  NEUROLOGICAL  SYMPTOMS        RECORDING THE    DURATION  AND  FREQUENCY. *  AVOID    CONDITIONS  AND  FACTORS   THAT  MAKE                  NEUROLOGICAL  SYMPTOMS  WORSE.           *TO  MAINTAIN  REGULAR  SLEEP  WAKE  CYCLES. *   TO  HAVE  ADEQUATE  REST  AND   SLEEP    HOURS.          *    AVOID  ANY USAGE OF    TOBACCO,              EXCESSIVE  ALCOHOL  AND   ILLEGAL   SUBSTANCES          *  CONTINUE   MEDICATIONS    PRESCRIBED   BY NEUROLOGIST    AS    RECOMMENDED       *   Compliance   With  Medications   And  Instructions          * CURRENTLY    TOLERATING  THE  PRESCRIBED   MEDICATIONS. WITHOUT  ANY  SIGNIFICANT  SIDE  EFFECTS   &  GETTING BENEFIT. *    Antiplatelet  therapy    As   Recommended  Was   Discussed          *    Prophylactic  Use   Of     Vitamin   B   Complex,  Folic  Acid,    Vitamin  B12    Multivitamin,       Calcium  With  magnesium  And  Vit D    Supplementations   Over  The  Counter  Discussed               *FOOT  CARE, DAILY  INSPECTION  OF  FEET   AND         PERIODIC  PODIATRY EVALUATIONS . *  PATIENT  IS  ALSO   COUNSELED   TO  KEEP    ACTIVITIES:         A)   SIMPLE      B)  ORGANIZED      C)  WRITEDOWN                   *  EVALUATIONS  AND  FOLLOW UP:                                      *CARDIOLOGY              *   OPTHALMOLOGY                                           *         CURRENTLY  ON   ASA  AND PLAVIX                                         PROPHYLACTIC ALLY           AND                                    TOLERATING  THE  SAME. *                PATIENT     STARTED    ON       ARICEPT    10  MG  PO   LAMB   WITH  SUPPER                                       IN   SEPT. 2019                                     PATIENT  TOLERATING  THE  SAME  AND  GETTING  BENEFIT.                                     *       H / O     SYNCOPE     IN     FEB. 2021                              PATIENT   WOKE    UP    ON     FLOOR      AS   NOTICED  BY   HIS  WIFE                                    D /  D  INCLUDE                                              CARDIAC    ARRHYTHMIA,    VASOVAGAL                                                 SEIZURES,   MEDICATION    EFFECTS                                                 AND   OTHER    ETIOLOGIES                             *     PATIENT  HAD     ER   VISIT      AND    EVALUATIONS                                  CT   HEAD  ON    02/01/2021       SHOWED                                   A)      NO   ACUTE  PATHOLOGY                                    B)    CHRONIC     ENCEPHALOMALACIA IN   LEFT   OCCIPITAL                                               AND  RIGHT   TEMPORAL  LOBES                                      C)    CHRONIC  CEREBRAL ISCHEMIA                                        *       PATIENT   RECOMMENDED                                   A)   CARDIOLOGY     EVALUATIONS   AND  WORK  UP                                    B)     EEG                           Orders Placed This Encounter   Procedures    VL DUP CAROTID BILATERAL    VL TRANSCRANIAL DOPPLER COMPLETE    [facility] [place]    Holter Monitor 48 Hour    ECHO Complete 2D W Doppler W Color    EEG                   *PATIENT   TO  FOLLOW  UP  WITH   PRIMARY  CARE         OTHER  CONSULTANTS  AS  BEFORE. *  Maintain   Healthy  Life Style    With   Periodic  Monitoring  Of      Any  Medical  Conditions  Including   Elevated  Blood  Pressure,  Lipid  Profile,     Blood  Sugar levels  AndHeart  Disease. *   Period   Screening  For  Cancers  Involving  Breast,  Colon,    lungs  And  Other  Organs  As  Applicable,  In consultation   With  Your  Primary Care Providers. *Second  Neurological  Opinion  And  Evaluations  In  [facility]  Setting  If  Patient  Is  Interested. * Please   Contact   Neurology  Clinic   Early   If   Are  Any  New  Neurological   And  Any neurological  Concerns. *  If  The  Patient remains  Neurologically  Stable   Return   To  [facility] Neurology Department   IN    1-2          MONTHS  TIME   FOR  FURTHER              FOLLOW UP.                       *   The  Neurological   Findings,  Possible  Diagnosis,  Differential diagnoses                    And  Options  For    Further   Investigations                   And  management   Are  Discussed  Comprehensively. Medications   And  Prescription   Risks  And  Side effects  Are   Also  Discussed.                      * If   There is  Any  Significant  Worsening   Of  Current  Symptoms  And  Or                  If patient  Develops   Any additional  New  NeurologicalSymptoms                  Or  Significant  Concerns   Should  Call  911 or  Go  To  Emergency  Department  For  Further  Immediate  Evaluation. The   Above  Were  Reviewed  With  Patient     AND  HIS  WIFE      and                       questions  Answered  In  Detail. More   Than  50% of face  To face Time   Was  Spent  On  Counseling                    And   Coordination  Of  Care   Of   Patient's  multiple   Neurological  Problems                         And   Comorbid  Medical   Conditions. Electronically signed by Ashely Fuentes MD.,  [facility]      Board Certified in  Neurology &  In  [place] of Psychiatry and Neurology (ABPN)      DISCLAIMER:   Although every effort was made to ensure the accuracy of this  electronictranscription, some errors in transcription may have occurred. GENERAL PATIENT INSTRUCTIONS:      A Healthy Lifestyle: Care Instructions   Your Care Instructions   A healthy lifestyle can help you feel good, stay at ahealthy weight, and have plenty of energy for both work and play. A healthy lifestyle is something you can share with your whole family.  A healthy lifestyle also can lower your risk for serious health problems, such ashigh blood pressure, heart disease, and diabetes.  You can follow a few steps listed below to improve your health and the health of your family.  Follow-up careis a key part of your treatment and safety. Be sure to make and go to all appointments, and call your doctor if you are having problems. Its also a good idea to know your test results and keep a list of the medicines you take.  How can you care for yourself at home?  Do not eat too much sugar, fat, or fast foods.  You can still have dessert and treats nowand then. The goal is moderation.  Start small to improve your eating habits. Pay attention to portion sizes, drink less juice and soda pop, and eat more fruits and vegetables.  Eat a healthy amount of food. A 3-ounce serving of meat, for example, is about the size of a deck of cards. Fill the rest of your plate with vegetables and whole grains.  Limit theamount of soda and sports drinks you have every day. Drink more water when you are thirsty.  Eat at least 5 servings of fruits and vegetables every day. It may seem like a lot, but it is not hard to reach this goal. Aserving or helping is 1 piece of fruit, 1 cup of vegetables, or 2 cups of leafy, raw vegetables. Have an apple or some carrot sticks as an afternoon snack instead of a candy bar. Try to have fruits and/or vegetables at everymeal.   Make exercise part of your daily routine. You may want to start with simple activities, such as walking, bicycling, or slow swimming. Try cesar active 30 to 60 minutes every day. You do not need to do all 30 to 60 minutes all at once. For example, you can exercise 3 times a day for 10 or 20 minutes. Moderate exercise is safe for most people, but it is always agood idea to talk to your doctor before starting an exercise program.   Keep moving. Faviola Gullivearthon the lawn, work in the garden, or [employer]. Take the stairs instead of the elevator at work.  If you smoke, quit. Peoplewho smoke have an increased risk for heart attack, stroke, cancer, and other lung illnesses. Quitting is hard, but there are ways to boost your chance of quitting tobacco for good.  Use nicotine gum, patches, or lozenges.  Ask your doctor about stop-smoking programs and medicines.  Keep trying.  In addition to reducing your risk of diseases in the future, you will notice some benefits soon after you stop using tobacco. If you have shortness of breath or asthma symptoms, they will likely getbetter within a few weeks after you quit.    Limit how much alcohol you drink. Moderate amounts of alcohol (up to 2 drinks a day for men, 1drink a day for women) are okay. But drinking too much can lead to liver problems, high blood pressure, and other health problems.  health   If you have a family, there are many things you can do together to improve your health.  Eat meals together as a family as often as possible.  Eat healthy foods. This includes fruits, vegetables, lean meats and dairy, and whole grains.  Include your family in your fitness plan. Most peoplethink of activities such as jogging or tennis as the way to fitness, but there are many ways you and your family can be more active. Anything that makes you breathe hard and gets your heart pumping is exercise. Here are sometips:   Walk to do errands or to take your child to school or the bus.  Go for a family bike ride after dinner instead of watching TV.  Where can you learn more?  Go toGrama [email or web address] and sign in to your [employer] account. Enter Z729 in the Search HealthInformation box to learn more about \"A Healthy Lifestyle: Care Instructions. \"     If you do not have anaccount, please click on the \"Sign Up Now\" link.  Current as of: July 26, 2016   Content Version: 11.2   © 1102-9988 [employer]. Care instructions adapted under license by [facility] [place] ([facility]). If you have questions about a medical condition or this instruction, always ask your healthcare professional. [employer] disclaims any warranty or liability for your use of this information.

## 2021-03-29 NOTE — PATIENT INSTRUCTIONS
* FALL   PRECAUTIONS.          *  NO  DRIVING      *   ADEQUATE   FLUID  INTAKE   AND  ELECTROLYTE  BALANCE           * KEEP  DAIRY  OF   THE  NEUROLOGICAL  SYMPTOMS          *  TO  MAINTAIN  REGULAR  SLEEP  WAKE  CYCLES. *   TO  HAVE  ADEQUATE  REST  AND   SLEEP    HOURS.        *    AVOID  USAGE OF   TOBACCO,  EXCESSIVE  ALCOHOL                AND   ILLEGAL   SUBSTANCES,  IF  ANY          *  Maintain   Healthy  Life Style    With   Periodic  Monitoring  Of      Any  Medical  Conditions  Including   Elevated  Blood  Pressure,  Lipid  Profile,     Blood  Sugar levels  And   Heart  Disease. *   Period   Screening  For  Cancers  Involving  Breast,  Colon,   lungs  And  Other  Organs  As  Applicable,  In consultation   With  Your  Primary Care Providers. *  If   There is  Any  Significant  Worsening   Of  Current  Symptoms  And  Or  If    Any additional  New  Neurological  Symptoms                 Or  Significant  Concerns   Should  Call  911 or  Go  To  Emergency  Department  For  Further  Immediate  Evaluation.

## 2021-03-31 ENCOUNTER — HOSPITAL ENCOUNTER (OUTPATIENT)
Dept: INTERVENTIONAL RADIOLOGY/VASCULAR | Age: 66
Discharge: HOME | End: 2021-04-02
Payer: MEDICARE

## 2021-03-31 ENCOUNTER — HOSPITAL ENCOUNTER (OUTPATIENT)
Dept: NON INVASIVE DIAGNOSTICS | Age: 66
Discharge: HOME | End: 2021-03-31
Payer: MEDICARE

## 2021-03-31 DIAGNOSIS — M54.40 LOW BACK PAIN WITH SCIATICA, SCIATICA LATERALITY UNSPECIFIED, UNSPECIFIED BACK PAIN LATERALITY, UNSPECIFIED CHRONICITY: ICD-10-CM

## 2021-03-31 DIAGNOSIS — H35.033 HYPERTENSIVE RETINOPATHY OF BOTH EYES: ICD-10-CM

## 2021-03-31 DIAGNOSIS — E08.01 DIABETES MELLITUS DUE TO UNDERLYING CONDITION WITH HYPEROSMOLARITY AND COMA, WITH LONG-TERM CURRENT USE OF INSULIN (HCC): ICD-10-CM

## 2021-03-31 DIAGNOSIS — H53.461 RIGHT HOMONYMOUS HEMIANOPSIA: ICD-10-CM

## 2021-03-31 DIAGNOSIS — F03.90 DEMENTIA WITHOUT BEHAVIORAL DISTURBANCE, UNSPECIFIED DEMENTIA TYPE: ICD-10-CM

## 2021-03-31 DIAGNOSIS — E78.2 MIXED HYPERLIPIDEMIA: ICD-10-CM

## 2021-03-31 DIAGNOSIS — Z98.890 HISTORY OF BACK SURGERY: ICD-10-CM

## 2021-03-31 DIAGNOSIS — E78.5 HYPERLIPIDEMIA, UNSPECIFIED HYPERLIPIDEMIA TYPE: ICD-10-CM

## 2021-03-31 DIAGNOSIS — I63.00 CEREBROVASCULAR ACCIDENT (CVA) DUE TO THROMBOSIS OF PRECEREBRAL ARTERY (HCC): ICD-10-CM

## 2021-03-31 DIAGNOSIS — R41.3 MEMORY LOSS: ICD-10-CM

## 2021-03-31 DIAGNOSIS — E11.49 TYPE 2 DIABETES MELLITUS WITH OTHER NEUROLOGIC COMPLICATION, WITHOUT LONG-TERM CURRENT USE OF INSULIN (HCC): ICD-10-CM

## 2021-03-31 DIAGNOSIS — I63.89 INFARCTION OF TEMPORAL LOBE (HCC): ICD-10-CM

## 2021-03-31 DIAGNOSIS — Z79.4 DIABETES MELLITUS DUE TO UNDERLYING CONDITION WITH HYPEROSMOLARITY AND COMA, WITH LONG-TERM CURRENT USE OF INSULIN (HCC): ICD-10-CM

## 2021-03-31 DIAGNOSIS — I10 ESSENTIAL HYPERTENSION: ICD-10-CM

## 2021-03-31 DIAGNOSIS — I63.9 OCCIPITAL CEREBRAL INFARCTION (HCC): ICD-10-CM

## 2021-03-31 DIAGNOSIS — K21.9 GASTROESOPHAGEAL REFLUX DISEASE WITHOUT ESOPHAGITIS: ICD-10-CM

## 2021-03-31 DIAGNOSIS — R55 SYNCOPE, UNSPECIFIED SYNCOPE TYPE: ICD-10-CM

## 2021-03-31 LAB
LV EF: 65 %
LVEF MODALITY: NORMAL

## 2021-03-31 PROCEDURE — 93306 TTE W/DOPPLER COMPLETE: CPT

## 2021-03-31 PROCEDURE — 93880 EXTRACRANIAL BILAT STUDY: CPT

## 2021-04-09 ENCOUNTER — HOSPITAL ENCOUNTER (OUTPATIENT)
Dept: NEUROLOGY | Age: 66
Discharge: HOME | End: 2021-04-09
Payer: MEDICARE

## 2021-04-09 DIAGNOSIS — I63.89 INFARCTION OF TEMPORAL LOBE (HCC): ICD-10-CM

## 2021-04-09 DIAGNOSIS — Z79.4 DIABETES MELLITUS DUE TO UNDERLYING CONDITION WITH HYPEROSMOLARITY AND COMA, WITH LONG-TERM CURRENT USE OF INSULIN (HCC): ICD-10-CM

## 2021-04-09 DIAGNOSIS — I10 ESSENTIAL HYPERTENSION: ICD-10-CM

## 2021-04-09 DIAGNOSIS — I63.00 CEREBROVASCULAR ACCIDENT (CVA) DUE TO THROMBOSIS OF PRECEREBRAL ARTERY (HCC): ICD-10-CM

## 2021-04-09 DIAGNOSIS — E08.01 DIABETES MELLITUS DUE TO UNDERLYING CONDITION WITH HYPEROSMOLARITY AND COMA, WITH LONG-TERM CURRENT USE OF INSULIN (HCC): ICD-10-CM

## 2021-04-09 DIAGNOSIS — E11.49 TYPE 2 DIABETES MELLITUS WITH OTHER NEUROLOGIC COMPLICATION, WITHOUT LONG-TERM CURRENT USE OF INSULIN (HCC): ICD-10-CM

## 2021-04-09 DIAGNOSIS — H53.461 RIGHT HOMONYMOUS HEMIANOPSIA: ICD-10-CM

## 2021-04-09 DIAGNOSIS — F03.90 DEMENTIA WITHOUT BEHAVIORAL DISTURBANCE, UNSPECIFIED DEMENTIA TYPE: ICD-10-CM

## 2021-04-09 DIAGNOSIS — H35.033 HYPERTENSIVE RETINOPATHY OF BOTH EYES: ICD-10-CM

## 2021-04-09 DIAGNOSIS — Z98.890 HISTORY OF BACK SURGERY: ICD-10-CM

## 2021-04-09 DIAGNOSIS — K21.9 GASTROESOPHAGEAL REFLUX DISEASE WITHOUT ESOPHAGITIS: ICD-10-CM

## 2021-04-09 DIAGNOSIS — R41.3 MEMORY LOSS: ICD-10-CM

## 2021-04-09 DIAGNOSIS — R55 SYNCOPE, UNSPECIFIED SYNCOPE TYPE: ICD-10-CM

## 2021-04-09 DIAGNOSIS — E78.2 MIXED HYPERLIPIDEMIA: ICD-10-CM

## 2021-04-09 DIAGNOSIS — I63.9 OCCIPITAL CEREBRAL INFARCTION (HCC): ICD-10-CM

## 2021-04-09 DIAGNOSIS — E78.5 HYPERLIPIDEMIA, UNSPECIFIED HYPERLIPIDEMIA TYPE: ICD-10-CM

## 2021-04-09 DIAGNOSIS — M54.40 LOW BACK PAIN WITH SCIATICA, SCIATICA LATERALITY UNSPECIFIED, UNSPECIFIED BACK PAIN LATERALITY, UNSPECIFIED CHRONICITY: ICD-10-CM

## 2021-04-09 PROCEDURE — 95813 EEG EXTND MNTR 61-119 MIN: CPT

## 2021-04-09 PROCEDURE — 93886 INTRACRANIAL COMPLETE STUDY: CPT

## 2021-04-09 PROCEDURE — 93892 TCD EMBOLI DETECT W/O INJ: CPT

## 2021-04-09 NOTE — PROGRESS NOTES
EXTENDED EEG Completed with Carter Analysis. PCP: Domenico Contreras MD    Ordering: Milton Mcpherson.  Ej Neurologist    Interpreting Physician: Ame Mcgregor Neurologist    Technician: Mt Baron RN

## 2021-04-09 NOTE — PROGRESS NOTES
TCD Completed with Emboli Detection. PCP: Nevaeh Mathias MD    Ordering: Autumn Dey. Ej Neurologist    Interpreting Physician: Autumn Dey.  Mariella Pagan    Electronically signed by Evin Smith RN on 4/9/2021 at 1:18 PM

## 2021-04-10 NOTE — PROCEDURES
Shyam [address]                 [address] [place] [id number]                         ELECTROENCEPHALOGRAM (EEG) REPORT      PATIENT NAME: Dewey Dean                 :        1955  MED REC NO:   [id number]                             ROOM:  ACCOUNT NO:   [de-identified]                           ADMIT DATE: 2021    PROVIDER:     Stephen Collnis MD    DATE OF STUDY:  2021      TECHNIQUE:  23 channels of EEG, 2 channels of EOG, 2 channel of EKG and  1 channel ground and 1 channel reference were recorded with Aidin  Software 32 Channel System utilizing the International 10/20 System  Protocol. Carter detection and seizure analysis protocols were utilized and the  study was reviewed using the comprehensive EEG monitoring. Carter detection trending allows to see at a glance timing of detected  seizure in the context of other changes in the EEG. Carter detection  analysis automatically notes the most types of electrode artifacts  making trends easier to review and more representative of cerebral  activity. Carter detection analysis also provides collection of trends  for monitoring and review including seizure probability, rhythmic  spectrogram left and right hemispheres, peak envelope, amplitude,  relative symmetry and suppression ratio. This is an extended EEG recorded for more than one hour. CLINICAL DATA:      The patient is 63years old with a history of memory  loss, previous history of stroke, dementia, syncope. The purpose of the study is to evaluate for associated seizure activity. MEDICATIONS:  Aricept, Plavix. BACKGROUND ACTIVITY:      While the patient was awake, the background  activity consisted of fairly-regulated 9 Hz waveforms seen over both  cerebral hemispheres. Intermittent brief frontal muscle artifacts noted.       ACTIVATION PROCEDURES:    HYPERVENTILATION:  Could not be performed due to the

## 2021-04-10 NOTE — PROCEDURES
were detected. IMPRESSION:      1. No significant focal stenosis or flow abnormalities noted in the         anterior circulation. 2.  Moderate vertebrobasilar stenosis. 3.  TCD of intracranial arteries for emboli detection is negative. Further clinical correlation and followup recommended. Latisha Sy MD, [facility]     Board Certified in Neurology  & in  [place] [address] of Psychiatry and Neurology (ABPN).         D: 04/09/2021 16:44:17       T: 04/09/2021 17:51:32     [id number]  Job#: [id number]     Doc#: [id number]    CC:    Abdirizak Chávez MD

## 2021-04-12 ENCOUNTER — HOSPITAL ENCOUNTER (OUTPATIENT)
Dept: NON INVASIVE DIAGNOSTICS | Age: 66
Discharge: HOME | End: 2021-04-12
Payer: MEDICARE

## 2021-04-12 DIAGNOSIS — E08.01 DIABETES MELLITUS DUE TO UNDERLYING CONDITION WITH HYPEROSMOLARITY AND COMA, WITH LONG-TERM CURRENT USE OF INSULIN (HCC): ICD-10-CM

## 2021-04-12 DIAGNOSIS — Z79.4 DIABETES MELLITUS DUE TO UNDERLYING CONDITION WITH HYPEROSMOLARITY AND COMA, WITH LONG-TERM CURRENT USE OF INSULIN (HCC): ICD-10-CM

## 2021-04-12 DIAGNOSIS — I10 ESSENTIAL HYPERTENSION: ICD-10-CM

## 2021-04-12 DIAGNOSIS — F03.90 DEMENTIA WITHOUT BEHAVIORAL DISTURBANCE, UNSPECIFIED DEMENTIA TYPE: ICD-10-CM

## 2021-04-12 DIAGNOSIS — E11.49 TYPE 2 DIABETES MELLITUS WITH OTHER NEUROLOGIC COMPLICATION, WITHOUT LONG-TERM CURRENT USE OF INSULIN (HCC): ICD-10-CM

## 2021-04-12 DIAGNOSIS — E78.2 MIXED HYPERLIPIDEMIA: ICD-10-CM

## 2021-04-12 DIAGNOSIS — H53.461 RIGHT HOMONYMOUS HEMIANOPSIA: ICD-10-CM

## 2021-04-12 DIAGNOSIS — I63.89 INFARCTION OF TEMPORAL LOBE (HCC): ICD-10-CM

## 2021-04-12 DIAGNOSIS — E78.5 HYPERLIPIDEMIA, UNSPECIFIED HYPERLIPIDEMIA TYPE: ICD-10-CM

## 2021-04-12 DIAGNOSIS — I63.00 CEREBROVASCULAR ACCIDENT (CVA) DUE TO THROMBOSIS OF PRECEREBRAL ARTERY (HCC): ICD-10-CM

## 2021-04-12 DIAGNOSIS — I63.9 OCCIPITAL CEREBRAL INFARCTION (HCC): ICD-10-CM

## 2021-04-12 DIAGNOSIS — K21.9 GASTROESOPHAGEAL REFLUX DISEASE WITHOUT ESOPHAGITIS: ICD-10-CM

## 2021-04-12 DIAGNOSIS — M54.40 LOW BACK PAIN WITH SCIATICA, SCIATICA LATERALITY UNSPECIFIED, UNSPECIFIED BACK PAIN LATERALITY, UNSPECIFIED CHRONICITY: ICD-10-CM

## 2021-04-12 DIAGNOSIS — R55 SYNCOPE, UNSPECIFIED SYNCOPE TYPE: ICD-10-CM

## 2021-04-12 DIAGNOSIS — Z98.890 HISTORY OF BACK SURGERY: ICD-10-CM

## 2021-04-12 DIAGNOSIS — R41.3 MEMORY LOSS: ICD-10-CM

## 2021-04-12 DIAGNOSIS — H35.033 HYPERTENSIVE RETINOPATHY OF BOTH EYES: ICD-10-CM

## 2021-04-12 PROCEDURE — 93225 XTRNL ECG REC<48 HRS REC: CPT

## 2021-04-12 PROCEDURE — 93226 XTRNL ECG REC<48 HR SCAN A/R: CPT

## 2021-04-14 ENCOUNTER — HOSPITAL ENCOUNTER (OUTPATIENT)
Dept: NON INVASIVE DIAGNOSTICS | Age: 66
Discharge: HOME | End: 2021-04-14
Payer: MEDICARE

## 2021-04-19 ENCOUNTER — OFFICE VISIT (OUTPATIENT)
Dept: CARDIOLOGY | Age: 66
End: 2021-04-19
Payer: MEDICARE

## 2021-04-19 VITALS
HEIGHT: 71 IN | HEART RATE: 100 BPM | BODY MASS INDEX: 31.5 KG/M2 | SYSTOLIC BLOOD PRESSURE: 124 MMHG | DIASTOLIC BLOOD PRESSURE: 91 MMHG | WEIGHT: 225 LBS

## 2021-04-19 DIAGNOSIS — E78.2 MIXED HYPERLIPIDEMIA: Primary | ICD-10-CM

## 2021-04-19 DIAGNOSIS — I63.00 CEREBROVASCULAR ACCIDENT (CVA) DUE TO THROMBOSIS OF PRECEREBRAL ARTERY (HCC): ICD-10-CM

## 2021-04-19 DIAGNOSIS — I10 ESSENTIAL HYPERTENSION: ICD-10-CM

## 2021-04-19 DIAGNOSIS — R55 SYNCOPE, UNSPECIFIED SYNCOPE TYPE: ICD-10-CM

## 2021-04-19 PROCEDURE — G8427 DOCREV CUR MEDS BY ELIG CLIN: HCPCS | Performed by: INTERNAL MEDICINE

## 2021-04-19 PROCEDURE — 1036F TOBACCO NON-USER: CPT | Performed by: INTERNAL MEDICINE

## 2021-04-19 PROCEDURE — 3017F COLORECTAL CA SCREEN DOC REV: CPT | Performed by: INTERNAL MEDICINE

## 2021-04-19 PROCEDURE — 99204 OFFICE O/P NEW MOD 45 MIN: CPT | Performed by: INTERNAL MEDICINE

## 2021-04-19 PROCEDURE — G8417 CALC BMI ABV UP PARAM F/U: HCPCS | Performed by: INTERNAL MEDICINE

## 2021-04-19 PROCEDURE — 99203 OFFICE O/P NEW LOW 30 MIN: CPT | Performed by: INTERNAL MEDICINE

## 2021-04-19 NOTE — PROGRESS NOTES
Cardiology Consultation/Follow Up. [address]  1955  [id number]    CC: Patient is here for new consult Re: syncope    HPI  Josh Ayala is here for new consult Re: syncope. This happened on . He was eating breakfast, and sudden he had LOC and fell to floor. He had LOC < 30 seconds. He came too and was alert. Had head injury. No EMS was called. Went to ER. No dizziness or lightheadedness since then. No chest pains or SOB. Has history of CVA. Since then the way he has been giving his medication has changed- now holding hydralazine, not giving it with other meds, giving it 1 hour after eating. Past Medical:  Past Medical History:   Diagnosis Date    [facility] [address]     Diabetes mellitus ([facility] [address]     History of back surgery     Measles     Osteoarthritis        Past Surgical:  Past Surgical History:   Procedure Laterality Date    BACK SURGERY      HERNIA REPAIR         Family History:  Family History   Problem Relation Age of Onset    High Blood Pressure Mother     Cataracts Mother     Diabetes Maternal Grandmother        Social History:  Social History     Tobacco Use    Smoking status: Former Smoker     Packs/day: 2.00     Years: 30.00     Pack years: 60.00     Types: Cigarettes     Start date:      Quit date: 2016     Years since quittin.8    Smokeless tobacco: Never Used   Substance Use Topics    Alcohol use: Never     Frequency: Never     Binge frequency: Never    Drug use: Never        REVIEW OF SYSTEMS:    · Constitutional: there has been no unanticipated weight loss. There's been No change in energy level, No change in activity level. · Eyes: No visual changes or diplopia. No scleral icterus. · ENT: No Headaches, hearing loss or vertigo. No mouth sores or sore throat.   · Cardiovascular: AS HPI  · Respiratory: AS HPI  · Gastrointestinal: No abdominal pain, appetite loss, blood in stools. No change in bowel or bladder habits. · Genitourinary: No dysuria, trouble voiding, or hematuria. · Musculoskeletal:  No gait disturbance, No weakness or joint complaints. · Integumentary: No rash or pruritis. · Neurological: No headache, diplopia, change in muscle strength, numbness or tingling. No change in gait, balance, coordination, mood, affect, memory, mentation, behavior. · Psychiatric: No new anxiety or depression. · Endocrine: No temperature intolerance. No excessive thirst, fluid intake, or urination. No tremor. · Hematologic/Lymphatic: No abnormal bruising or bleeding, blood clots or swollen lymph nodes. · Allergic/Immunologic: No nasal congestion or hives.     Medications:  Current Outpatient Medications   Medication Sig Dispense Refill    donepezil (ARICEPT) 10 MG tablet TAKE 1 TABLET DAILY WITH SUPPER 90 tablet 1    amLODIPine (NORVASC) 10 MG tablet TAKE 1 TABLET DAILY 90 tablet 3    metFORMIN (GLUCOPHAGE) 500 MG tablet TAKE 1 TABLET TWICE A  tablet 3    lisinopril (PRINIVIL;ZESTRIL) 20 MG tablet TAKE 1 TABLET DAILY 90 tablet 3    clopidogrel (PLAVIX) 75 MG tablet TAKE 1 TABLET DAILY 90 tablet 3    hydrALAZINE (APRESOLINE) 25 MG tablet TAKE 1 TABLET THREE TIMES A  tablet 3    atorvastatin (LIPITOR) 40 MG tablet TAKE 1 TABLET DAILY 90 tablet 3    cyclobenzaprine (FLEXERIL) 10 MG tablet TAKE 1 TABLET THREE TIMES A  tablet 3    aspirin 81 MG tablet Take 81 mg by mouth daily      vitamin B-12 (CYANOCOBALAMIN) 1000 MCG tablet Take 1,000 mcg by mouth daily      Cholecalciferol (VITAMIN D3) 5000 units TABS Take 5,000 Units by mouth daily      vitamin E 400 UNIT capsule Take 400 Units by mouth daily      magnesium oxide (MAG-OX) 400 MG tablet Take 400 mg by mouth daily       Current Facility-Administered Medications   Medication Dose Route Frequency Provider Last Rate Last Admin    phenylephrine (MYDFRIN) 2.5 % ophthalmic solution 1 drop  1 drop Both Eyes Once Anni Palm MD        tropicamide (MYDRIACYL) 1 % ophthalmic solution 1 drop  1 drop Both Eyes Once Anni Palm MD           Physical Exam:   Vitals: BP (!) 124/91   Pulse 100   Ht 5' 11\" (1.803 m)   Wt 225 lb (102.1 kg)   BMI 31.38 kg/m²   General appearance: alert and cooperative with exam  HEENT: Head: Normocephalic, no lesions, without obvious abnormality. Neck: no carotid bruit, no JVD  Lungs: clear to auscultation bilaterally  Heart: regular rate and rhythm, S1, S2 normal, no murmur, click, rub or gallop  Abdomen: soft, non-tender; bowel sounds normal; no masses,  no organomegaly  Extremities: extremities normal, atraumatic, no cyanosis or edema  Neurologic: Mental status: Alert, oriented, thought content appropriate    Labs:  Lab Results   Component Value Date    CHOL 142 07/02/2019    TRIG 280 (H) 07/02/2019    HDL 35 (L) 07/02/2019    LDLCHOLESTEROL 51 07/02/2019    LDLCALC 50 11/02/2017    VLDL NOT REPORTED (H) 07/02/2019    CHOLHDLRATIO 4.1 07/02/2019       Lab Results   Component Value Date     02/01/2021    K 4.0 02/01/2021     02/01/2021    CO2 27 02/01/2021    BUN 18 02/01/2021    CREATININE 1.08 02/01/2021    GLUCOSE 160 (H) 02/01/2021    CALCIUM 9.7 02/01/2021    PROT 7.0 07/02/2019    LABALBU 4.5 07/02/2019    BILITOT 0.47 07/02/2019    ALKPHOS 67 07/02/2019    AST 13 07/02/2019    ALT 19 07/02/2019    LABGLOM >60 02/01/2021    GFRAA >60 02/01/2021     EKG:    Normal sinus rhythm  Left axis deviation  Low voltage QRS  Inferior infarct , age undetermined  Abnormal ECG  No previous ECGs available     ECHO:  Results for orders placed or performed during the hospital encounter of 03/31/21   ECHO Complete 2D W Doppler W Color  Summary  Normal left ventricular diameter. Borderline left ventricular hypertrophy. Left ventricular systolic function is normal.  Left ventricular ejection fraction 65 %. No doppler evidence of diastolic dysfunction.   Normal structure and function of

## 2021-05-14 ENCOUNTER — OFFICE VISIT (OUTPATIENT)
Dept: NEUROLOGY | Age: 66
End: 2021-05-14
Payer: MEDICARE

## 2021-05-14 VITALS
BODY MASS INDEX: 31.64 KG/M2 | SYSTOLIC BLOOD PRESSURE: 120 MMHG | WEIGHT: 226 LBS | HEART RATE: 94 BPM | OXYGEN SATURATION: 96 % | DIASTOLIC BLOOD PRESSURE: 82 MMHG | HEIGHT: 71 IN

## 2021-05-14 DIAGNOSIS — R41.3 MEMORY LOSS: ICD-10-CM

## 2021-05-14 DIAGNOSIS — I63.00 CEREBROVASCULAR ACCIDENT (CVA) DUE TO THROMBOSIS OF PRECEREBRAL ARTERY (HCC): Primary | ICD-10-CM

## 2021-05-14 DIAGNOSIS — Z98.890 HISTORY OF BACK SURGERY: ICD-10-CM

## 2021-05-14 DIAGNOSIS — Z87.898 H/O SYNCOPE: ICD-10-CM

## 2021-05-14 DIAGNOSIS — H35.033 HYPERTENSIVE RETINOPATHY OF BOTH EYES: ICD-10-CM

## 2021-05-14 DIAGNOSIS — E11.9 TYPE 2 DIABETES MELLITUS WITHOUT COMPLICATION, WITHOUT LONG-TERM CURRENT USE OF INSULIN (HCC): ICD-10-CM

## 2021-05-14 DIAGNOSIS — I63.89 INFARCTION OF TEMPORAL LOBE (HCC): ICD-10-CM

## 2021-05-14 DIAGNOSIS — E11.49 TYPE 2 DIABETES MELLITUS WITH OTHER NEUROLOGIC COMPLICATION, WITHOUT LONG-TERM CURRENT USE OF INSULIN (HCC): ICD-10-CM

## 2021-05-14 DIAGNOSIS — F03.90 DEMENTIA WITHOUT BEHAVIORAL DISTURBANCE, UNSPECIFIED DEMENTIA TYPE: ICD-10-CM

## 2021-05-14 DIAGNOSIS — H53.461 RIGHT HOMONYMOUS HEMIANOPSIA: ICD-10-CM

## 2021-05-14 DIAGNOSIS — I63.9 OCCIPITAL CEREBRAL INFARCTION (HCC): ICD-10-CM

## 2021-05-14 DIAGNOSIS — I10 ESSENTIAL HYPERTENSION: ICD-10-CM

## 2021-05-14 PROCEDURE — 99214 OFFICE O/P EST MOD 30 MIN: CPT | Performed by: PSYCHIATRY & NEUROLOGY

## 2021-05-14 PROCEDURE — 99213 OFFICE O/P EST LOW 20 MIN: CPT | Performed by: PSYCHIATRY & NEUROLOGY

## 2021-05-14 PROCEDURE — G8427 DOCREV CUR MEDS BY ELIG CLIN: HCPCS | Performed by: PSYCHIATRY & NEUROLOGY

## 2021-05-14 PROCEDURE — 3017F COLORECTAL CA SCREEN DOC REV: CPT | Performed by: PSYCHIATRY & NEUROLOGY

## 2021-05-14 PROCEDURE — 3046F HEMOGLOBIN A1C LEVEL >9.0%: CPT | Performed by: PSYCHIATRY & NEUROLOGY

## 2021-05-14 PROCEDURE — 1036F TOBACCO NON-USER: CPT | Performed by: PSYCHIATRY & NEUROLOGY

## 2021-05-14 PROCEDURE — 2022F DILAT RTA XM EVC RTNOPTHY: CPT | Performed by: PSYCHIATRY & NEUROLOGY

## 2021-05-14 PROCEDURE — G8417 CALC BMI ABV UP PARAM F/U: HCPCS | Performed by: PSYCHIATRY & NEUROLOGY

## 2021-05-14 ASSESSMENT — ENCOUNTER SYMPTOMS
WHEEZING: 0
VOICE CHANGE: 0
VOMITING: 0
BACK PAIN: 1
CHOKING: 0
CONSTIPATION: 0
EYE REDNESS: 0
CHANGE IN BOWEL HABIT: 0
ABDOMINAL DISTENTION: 0
APNEA: 0
COUGH: 0
SWOLLEN GLANDS: 0
FACIAL SWELLING: 0
TROUBLE SWALLOWING: 0
EYE DISCHARGE: 0
CHEST TIGHTNESS: 0
SINUS PRESSURE: 0
BOWEL INCONTINENCE: 0
COLOR CHANGE: 0
VISUAL CHANGE: 1
BLOOD IN STOOL: 0
ABDOMINAL PAIN: 0
EYE ITCHING: 0
SHORTNESS OF BREATH: 0
DIARRHEA: 0
PHOTOPHOBIA: 0
EYE PAIN: 0
NAUSEA: 0
SORE THROAT: 0

## 2021-05-14 NOTE — PROGRESS NOTES
other  available   medical  records   were  Also  reviewed. The  Duration,  Quality,  Severity,  Location,  Timing,  Context,  Modifying  Factors   Of   The   Chief   Complaint       And  Present  Illness  Was   Reviewed   In   Chronological   [place]. PATIENT'S  MAIN  CONCERNS INCLUDE :                     1)     H/O    OLD    STROKE  IN     2016           WITH                                       HOSPITALIZATION  IN  [place]                             2)    PATIENT  WAS    REPORTED  TO    UNREPONSIVE                                       DUE  TO  CARDIAC    PROBLEMS                                 3 )    MRI  BRAIN   IN    SEPT. 2016     SHOWED   :                               A)   BILATERAL  TEMPORAL  INFARCTS                                 B)   OLD  LEFT  OCCIPITAL INFARCT                               4)      H/O   POST  STROKE     MEMORY  AND  COGNITIVE  PROBLEMS                                              SINCE     2016                                    -  ON  ARICEPT    5  MG  DAILY                             5)      RESIDUAL   RIGHT     VISUAL   FIELD  DEFECTS                                              DUE  TO  PREVIOUS  STROKE                               6)     CURRENTLY  ON   ASA  AND PLAVIX                                         PROPHYLACTIC ALLY           AND                                    TOLERATING  THE  SAME.                                7)     MULTIPLE  CO  MORBID  MEDICAL  CONDITIONS                                 BEING  FOLLOWED  BY  HIS  PCP                                8)     DIABETIC  PERIPHERAL  POLYNEUROPATHY                                               MOSTLY   BOTH  LOWER  EXTREMITIES                                 9)   H/O   CHRONIC  LUMBAR  PAIN                            PREVIOUS  H/O   LUMBAR  DISC   SURGERIES                                    10)     MILD    BALANCE PROBLEMS -     STABLE                              11)    H/O  CHRONIC    MILD  ANXIETY. PATIENT  DENIES  ANY  DEPRESSIVE  SYMPTOMS                              12)     PREVIOUS  H/O   SMOKING. PATIENT  QUIT  SMOKING  SINCE     SEPT. 2016                                                                 13)        HAD    NEURO  DIAGNOSTIC  EVALUATIONS  IN  July 2019                           A)   CT   HEAD  SHOWED   NO  ACUTE  PATHOLOGY                                           REMOTE      STROKES   -   REMAIN    UN  CHANGED                           B)    CAROTID  DOPPLER,  EEG  ,  LABS  SHOW   NO  SIGNIFICANT                                            ABNORMALITIES                                                             14)          AS   DISCUSSED    WITH  PATIENT   AND  HIS  WIFE                                PATIENT     STARTED    ON       ARICEPT    10  MG  PO   LAMB   WITH  SUPPER                                       IN   SEPT. 2019                                     PATIENT  TOLERATING  THE  SAME  AND  GETTING  BENEFIT. EXPECTATIONS,   GOALS   AND  SIDE  EFFECTS  MEDICATION    WERE                                 REVIEWED     AND   DISCUSSED    IN    DETAIL.                               15)      H / O     SYNCOPE     IN     FEB. 2021                              PATIENT   WOKE    UP    ON     FLOOR      AS   NOTICED  BY   HIS  WIFE                                   D /  D  INCLUDE                                              CARDIAC    ARRHYTHMIA,    VASOVAGAL                                                 SEIZURES,   MEDICATION    EFFECTS                                                     AND   OTHER    ETIOLOGIES                             16)     PATIENT  HAD     ER   VISIT      AND    EVALUATIONS                                  CT   HEAD  ON    02/01/2021       SHOWED A)      NO   ACUTE  PATHOLOGY                                    B)    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                                               AND  RIGHT   TEMPORAL  LOBES                                      C)    CHRONIC  CEREBRAL ISCHEMIA                       17)      CAROTID  DOPPLER  AND   EEG  IN  April 2021                                  SHOWED    NO    SIGNIFICANT  ABNORMALITIES                                       18)     PATIENT   HAD      CARDIOLOGY     EVALUATIONS                             PATIENT    SYMPTOMS      IMPROVED    AFTER    STOPPING                                 HYDRALAZINE                                     NO   FURTHER   RECURRENCE  OF  SYNCOPE.                                     NO  NEW   NEUROLOGICAL    CONCERNS. 19)     VARIOUS  RISK   FACTORS   WERE  REVIEWED   AND   DISCUSSED. PATIENT   HAS  MULTIPLE   MEDICAL, NEUROLOGICAL      PROBLEMS                     PATIENT'S   MANAGEMENT  IS  CHALLENGING.                                     PRECIPITATING  FACTORS: including  fever/infection, exertion/relaxation, position change,                        stress, weather change,                        medications/alcohol, time of day/darkness/light  Are  absent                                                                MODIFYING  FACTORS:  fever/infection, exertion/relaxation, position change, stress, weather change,                        medications/alcohol, time of day/darkness/light  Are  absent             Patient   Indicates   The  Presence   And  The  Absence  Of  The  Following    Associated  And          Additional  Neurological    Symptoms:                                Balance  And coordination   problems  present           Gait problems     absent            Headaches      absent              Migraines           absent           Memory problemspresent Confusion        absent            Paresthesia   numbness          present           Seizures  And  Starring  Episodes           absent           Syncope,  Near  syncopal episodes         absent           Speech   problems           absent             Swallowing   Problems      absent            Dizziness,  Light headedness           absent              Vertigo        absent             Generalized   Weakness    absent              focal  Weakness     absent             Tremors         absent              Sleep  Problems     absent             History  Of   Recent  Head  Injury     absent             History  Of   Recent  TIA     absent             History  Of   Recent    Stroke     absent             Neck  Pain   and   Neck muscle  Spasms  absent               Radiating  down   And   Weakness           absent            Lower back   Pain  And     Spasms  present              Radiating    Down   And   Weakness          present                H/O    FALLS        absent               History  Of   Visual  Symptoms   PRESENT                    Associated   Diplopia       absent                                               Also   Additional   Symptoms   Present    As  Documented    In   The   detailed                  Review  Of  Systems   And    Please   Refer   To    Them for   Additional    Information. Any components  That are either  Unobtainable  Or  Limited  In   HPI, ROS  And/or PFSH   Are                   Due   ToPatient's  Medical  Problems,  Clinical  Condition   and/or lack of                                other    Alternate   resources.           RECORDS   REVIEWED:    historical medical records       INFORMATION   REVIEWED:     MEDICAL   HISTORY,SURGICAL   HISTORY,     MEDICATIONS   LIST,   ALLERGIES AND  DRUG  INTOLERANCES,       FAMILY   HISTORY,  SOCIAL  HISTORY,      PROBLEM  LIST   FOR  PATIENT  CARE   COORDINATION      Past Medical History:   Diagnosis Date    [facility]     [facility]     History of back surgery     Measles     Osteoarthritis          Past Surgical History:   Procedure Laterality Date    BACK SURGERY      HERNIA REPAIR           Current Outpatient Medications   Medication Sig Dispense Refill    donepezil (ARICEPT) 10 MG tablet TAKE 1 TABLET DAILY WITH SUPPER 90 tablet 1    amLODIPine (NORVASC) 10 MG tablet TAKE 1 TABLET DAILY 90 tablet 3    metFORMIN (GLUCOPHAGE) 500 MG tablet TAKE 1 TABLET TWICE A  tablet 3    lisinopril (PRINIVIL;ZESTRIL) 20 MG tablet TAKE 1 TABLET DAILY 90 tablet 3    clopidogrel (PLAVIX) 75 MG tablet TAKE 1 TABLET DAILY 90 tablet 3    atorvastatin (LIPITOR) 40 MG tablet TAKE 1 TABLET DAILY 90 tablet 3    cyclobenzaprine (FLEXERIL) 10 MG tablet TAKE 1 TABLET THREE TIMES A  tablet 3    aspirin 81 MG tablet Take 81 mg by mouth daily      vitamin B-12 (CYANOCOBALAMIN) 1000 MCG tablet Take 1,000 mcg by mouth daily      Cholecalciferol (VITAMIN D3) 5000 units TABS Take 5,000 Units by mouth daily      vitamin E 400 UNIT capsule Take 400 Units by mouth daily      magnesium oxide (MAG-OX) 400 MG tablet Take 400 mg by mouth daily       Current Facility-Administered Medications   Medication Dose Route Frequency Provider Last Rate Last Admin    phenylephrine (MYDFRIN) 2.5 % ophthalmic solution 1 drop  1 drop Both Eyes Once Dixon Baxter MD        tropicamide (MYDRIACYL) 1 % ophthalmic solution 1 drop  1 drop Both Eyes Once Dixon Baxter MD             No Known Allergies      Family History   Problem Relation Age of Onset    High Blood Pressure Mother     Cataracts Mother     Diabetes Maternal Grandmother          Social History     Socioeconomic History    Marital status:      Spouse name: Not on file    Number of children: Not on file    Years of education: Not on file    Highest education level: Not on file   Occupational History    Not on file   Social Needs    Financial resource strain: Not on file    Food insecurity     Worry: Not on file     Inability: Not on file    Transportation needs     Medical: Not on file     Non-medical: Not on file   Tobacco Use    Smoking status: Former Smoker     Packs/day: 2.00     Years: 30.00     Pack years: 60.00     Types: Cigarettes     Start date: 14     Quit date: 2016     Years since quittin.9    Smokeless tobacco: Never Used   Substance and Sexual Activity    Alcohol use: Never     Frequency: Never     Binge frequency: Never    Drug use: Never    Sexual activity: Not on file   Lifestyle    Physical activity     Days per week: Not on file     Minutes per session: Not on file    Stress: Not on file   Relationships    Social connections     Talks on phone: Not on file     Gets together: Not on file     Attends Yazidism service: Not on file     Active member of club or organization: Not on file     Attends meetings of clubs or organizations: Not on file     Relationship status: Not on file    Intimate partner violence     Fear of current or ex partner: Not on file     Emotionally abused: Not on file     Physically abused: Not on file     Forced sexual activity: Not on file   Other Topics Concern    Not on file   Social History Narrative    Not on file       Vitals:    21 1537   BP: 120/82   Pulse: 94   SpO2: 96%         Wt Readings from Last 3 Encounters:   21 226 lb (102.5 kg)   21 225 lb (102.1 kg)   21 200 lb (90.7 kg)         BP Readings from Last 3 Encounters:   21 120/82   21 (!) 124/91   21 130/88       Chemistries  Lab Results   Component Value Date     2021    K 4.0 2021     2021    CO2 27 2021    BUN 18 2021    CREATININE 1.08 2021    CALCIUM 9.7 2021    PROT 7.0 2019    LABALBU 4.5 2019    BILITOT 0.47 2019    ALKPHOS 67 2019    AST 13 2019    ALT 19 2019     Lab Results   Component Value Date    ALKPHOS 67 07/02/2019    ALT 19 07/02/2019    AST 13 07/02/2019    PROT 7.0 07/02/2019    BILITOT 0.47 07/02/2019    LABALBU 4.5 07/02/2019     Lab Results   Component Value Date    BUN 18 02/01/2021    CREATININE 1.08 02/01/2021     Lab Results   Component Value Date    CALCIUM 9.7 02/01/2021    MG 1.9 07/02/2019     Lab Results   Component Value Date    AST 13 07/02/2019    ALT 19 07/02/2019     Lab Results   Component Value Date    [id number] 07/08/2019           Review of Systems   Constitutional: Negative for appetite change, chills, diaphoresis, fatigue, fever and unexpected weight change. HENT: Negative for congestion, dental problem, drooling, ear discharge, ear pain, facial swelling, hearing loss, mouth sores, nosebleeds, postnasal drip, sinus pressure, sore throat, tinnitus, trouble swallowing and voice change. Eyes: Positive for visual disturbance. Negative for photophobia, pain, discharge, redness and itching. Respiratory: Negative for apnea, cough, choking, chest tightness, shortness of breath and wheezing. Cardiovascular: Negative for chest pain, palpitations, leg swelling and near-syncope. Gastrointestinal: Negative for abdominal distention, abdominal pain, anorexia, blood in stool, bowel incontinence, change in bowel habit, constipation, diarrhea, nausea and vomiting. Endocrine: Negative for cold intolerance, heat intolerance, polydipsia, polyphagia and polyuria. Genitourinary: Negative for bladder incontinence. Musculoskeletal: Positive for back pain. Negative for arthralgias, gait problem, joint swelling, myalgias, neck pain and neck stiffness. Skin: Negative for color change, pallor, rash and wound. Allergic/Immunologic: Negative for environmental allergies, food allergies and immunocompromised state. Neurological: Positive for numbness and loss of balance.  Negative for dizziness, vertigo, tremors, focal weakness, seizures, syncope, facial asymmetry, speech difficulty, weakness, light-headedness and headaches. Hematological: Negative for adenopathy. Does not bruise/bleed easily. Psychiatric/Behavioral: Positive for decreased concentration and memory loss. Negative for agitation, behavioral problems, confusion, dysphoric mood, hallucinations, self-injury, sleep disturbance and suicidal ideas. The patient is nervous/anxious. The patient is not hyperactive. OBJECTIVE:    Physical Exam  Constitutional:       Appearance: He is well-developed. HENT:      Head: Normocephalic and atraumatic. No raccoon eyes or Adame's sign. Right Ear: External ear normal.      Left Ear: External ear normal.      Nose: Nose normal.   Eyes:      Conjunctiva/sclera: Conjunctivae normal.   Neck:      Musculoskeletal: Normal range of motion and neck supple. Normal range of motion. No neck rigidity or muscular tenderness. Thyroid: No thyroid mass or thyromegaly. Vascular: No carotid bruit. Trachea: No tracheal deviation. Meningeal: Brudzinski's sign and Kernig's sign absent. Cardiovascular:      Rate and Rhythm: Normal rate and regular rhythm. Pulmonary:      Effort: Pulmonary effort is normal.   Musculoskeletal:         General: No tenderness. Skin:     General: Skin is warm. Coloration: Skin is not pale. Findings: No erythema or rash. Nails: There is no clubbing. Psychiatric:         Attention and Perception: He is attentive. Mood and Affect: Mood is anxious. Mood is not depressed. Affect is not labile, blunt, angry or inappropriate. Speech: Speech is delayed. Behavior: Behavior is slowed. Behavior is not agitated, aggressive, withdrawn, hyperactive or combative. Behavior is cooperative. Thought Content: Thought content is not paranoid or delusional. Thought content does not include homicidal or suicidal ideation. Thought content does not include homicidal or suicidal plan.          Cognition and Memory: Cognition is impaired. Memory is impaired. He exhibits impaired recent memory. He does not exhibit impaired remote memory. Judgment: Judgment is not impulsive or inappropriate. Neurologic Exam      NEUROLOGICALEXAMINATION :       A) MENTAL STATUS:                   Alert and  oriented  To time, place  And  Person. No Aphasia. No  Dysarthria. Able   To  Follow   SIMPLE     commands   without   Any  Difficulty. No right  To left confusion. Normal  Speech  And language function. Insight and  Judgment ,Fund  Of  Knowledge   within normal limits                Recent  And  Remote memory   DECREASED                Attention &  Concentration are    DECREASED                                                B) CRANIAL NERVES :      CN : Visual  Acuity  And  Visual fields    RESIDUAL  RIGHT  VISUAL  FIELD  IMPAIREMENTS               Fundi  Could  Not  Be  Could  Not  Be  Evaluated. 3,4,6 CN : Both  Pupils are   Reactive and  Equal.  Movements  Are  Intact. No  Nystagmus. No  CRISTIAN. No  Afferent  Pupillary  Defect noted. 5 CN :  Normal  Facial sensations and Corneal  Reflexes           7 CN:  Normal  Facial  Symmetry  And  Strength. No facial  Weakness. 8 CN :  Hearing  Appears within normal limits          9, 10 CN: Normal   spontaneous, reflex   palate   movements         11 CN:   Normal  Shoulder  shrug and  strength         12 CN :   Normal  Tongue movements and  Tongue  In midline                        No tongue   Fasciculations or atrophy       C) MOTOR  EXAM:                 Strength  In upper  And  Lower   extremities   within   normal limits               No  Drift.      No  Atrophy               Rapid   alternating  And  repetitions  Movements  within   normal limits               Muscle  Tone  In upper  And  Lower  Extremities  normal No rigidity. No  Spasticity. Bradykinesia   absent               No  Asterixis. Sustention  Tremor , Resting   Tremor   absent                    No   other  Abnormal  Movements noted           D) SENSORY :               Light   touch, pinprick,   position  And  Vibration  DECREASED                             BELOW  BOTH  KNEE  LEVELS        E) REFLEXES:                   Deep  Tendon  Reflexes     DECREASED                    No  pathological  Reflexes  Bilaterally. F) COORDINATION  AND  GAIT :                                Station and  Gait    SLOW                               Romberg 's test   POSITIVE                            Ataxia negative        ASSESSMENT:        Patient Active Problem List   Diagnosis    Low back pain    Gastroesophageal reflux disease without esophagitis    Memory loss    Hyperlipidemia    Essential hypertension    Type 2 diabetes mellitus without complication, without long-term current use of insulin (HCC)    Right homonymous hemianopsia    Hypertensive retinopathy of both eyes    Combined forms of age-related cataract of both eyes    History of back surgery    Diabetes mellitus ([address]    Occipital cerebral infarction ([address]    Infarction of temporal lobe ([address]    Dementia without behavioral disturbance ([address]    Cerebrovascular accident (CVA) due to thrombosis of precerebral artery ([address]    Syncope         ULTRASOUND EVALUATION OF THE CAROTID ARTERIES       3/31/2021       COMPARISON:   July 12, 2019.       HISTORY:   ORDERING SYSTEM PROVIDED HISTORY: Dementia without behavioral disturbance,   unspecified dementia type ([address]       FINDINGS:   Gray scale and color and spectral doppler ultrasound examination of the   carotid and vertebral arteries was performed.       The bilateral common carotid, internal carotid, and external carotid arteries   are patent.  There is no appreciable carotid plaque formation or intimal   medial thickening.       The arterial waveforms and peak systolic velocity measurements within the   common carotid, internal carotid, and external carotid arteries are within   normal limits bilaterally.       The internal carotid to common carotid ratios are within normal limits   bilaterally, measuring 1.30 on the right and 0.50 on the left.       The vertebral arteries are patent and demonstrate appropriate antegrade flow   direction.               Impression   Normal carotid ultrasound study.                   CT OF THE HEAD WITHOUT CONTRAST  2/1/2021 1:24 pm       TECHNIQUE:   CT of the head was performed without the administration of intravenous   contrast. Dose modulation, iterative reconstruction, and/or weight based   adjustment of the mA/kV was utilized to reduce the radiation dose to as low   as reasonably achievable.       COMPARISON:   Head CT of 07/12/2019       HISTORY:   ORDERING SYSTEM PROVIDED HISTORY: syncope   TECHNOLOGIST PROVIDED HISTORY:       syncope   Reason for Exam: Syncope today with fall,abrasion to posterior skull. History   of old  stroke and memory loss. Acuity: Acute   Type of Exam: Initial       FINDINGS:   There is no acute  intracranial hemorrhage or intracranial mass lesion.  No   mass effect, midline shift or extra-axial collection is noted.       There are moderate nonspecific foci of periventricular and subcortical   cerebral white matter hypodensity, most likely representing chronic   microangiopathic disease in this age group.  There are chronic   encephalomalacia changes within the left occipital lobe and posterior aspect   of right temporal lobe.  The brain parenchyma is otherwise normal. The   cerebellar tonsils are in normal position.       The ventricles, sulci, and cisterns are mildly prominent suggestive of mild   generalized volume loss.       The globes and orbits are within normal limits.  Calcified sebaceous cyst   within the subcutaneous tissues of central occipital region.  The visualized   extracranial structures including paranasal sinuses and mastoid air cells are   unremarkable. No fracture is identified.           Impression       Stable study.  No significant change since prior head CT of 2019.       Chronic encephalomalacia changes within the left occipital lobe and posterior   aspect of right temporal lobe.       No evidence for acute intracranial hemorrhage or intracranial mass lesion.       Moderate chronic microangiopathic ischemic disease.       Mild generalized volume loss. CT OF THE HEAD WITHOUT CONTRAST  7/12/2019 4:17 pm       TECHNIQUE:   CT of the head was performed without the administration of intravenous   contrast. Dose modulation, iterative reconstruction, and/or weight based   adjustment of the mA/kV was utilized to reduce the radiation dose to as low   as reasonably achievable.       COMPARISON:   None.       HISTORY:   ORDERING SYSTEM PROVIDED HISTORY: Right homonymous hemianopsia   TECHNOLOGIST PROVIDED HISTORY:   history of CVA   Reason for Exam: Balance and memory loss with vision changes.  History of CVA   1 year ago   Acuity: Chronic   Type of Exam: Initial       FINDINGS:   BRAIN/VENTRICLES: There is no acute intracranial hemorrhage, mass effect or   midline shift.  No abnormal extra-axial fluid collection.  The gray-white   differentiation is maintained without evidence of an acute infarct.  There is   no evidence of hydrocephalus.  Old ischemic strokes with resultant   encephalomalacia in the left occipital lobe and posterior aspect of right   temporal lobe.       ORBITS: The visualized portion of the orbits demonstrate no acute abnormality.       SINUSES: The visualized paranasal sinuses and mastoid air cells demonstrate   no acute abnormality.       SOFT TISSUES/SKULL:  No acute abnormality of the visualized skull or soft   tissues.  Small sebaceous cyst with calcifications in the subcutaneous fat of   the posterior parietal scalp along the midline.           Impression   No acute intracranial abnormality.       Old ischemic strokes with resultant encephalomalacia in the left occipital   lobe and posterior aspect of right temporal lobe.             History:  Stroke    Exam/Technique:  Multiplanar multisequence images were obtained without the use of contrast.    Comparison:  Cerebral MRI from September 29, 2016    Fidel Vizcaino is no evidence of recent infarcts on diffusion-weighted images. There is an old left occipital cortical infarct medially, and  smaller areas of old infarction in the mesial temporal cortex bilaterally. These were recent time of the previous MRI There are also scattered punctate areas of increased T2 signal in the   deep supratentorial white matter that are consistent with chronic small vessel ischemic change. This also one 3 mm diameter fluid intensity area in the left cerebellar white matter that is consistent with an old lacunar infarct. Ventricles and CSF spaces are bilaterally symmetric and within normal limits. Gray-white matter differentiation is normal throughout. Incidental note is made of an apparent sedation cyst along the occipital portion of the scalp. There is some fluid content in mastoid air cells bilaterally. Otherwise, no gross abnormalities are displayed in the bones or soft tissues of the skull base. IMPRESSION:  Old left occipital and bilateral temporal infarcts, and additional chronic abnormalities. No evidence of recent infarcts or other additional intracranial abnormalities.      Finalized by Romi Barrios MD on 12/20/2016 2:23 PM      ULTRASOUND EVALUATION OF THE CAROTID ARTERIES       7/12/2019       COMPARISON:   None.       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 *  PATIENT   HAS  MULTIPLE   MEDICAL, NEUROLOGICAL      PROBLEMS         PATIENT'S   MANAGEMENT  IS  CHALLENGING. PLAN:                         Lizzeth Salcido  Of  The  Diagnoses,  The  Management & Treatment  Options            AND    Care  plan  Were          Reviewed and   Discussed   With  patient. * Goals  And  Expectations  Of  The  Therapy  Discussed   And  Reviewed. *   Benefits   And   Side  Effect  Profile  Of  Medication/s   Were   Discussed                * Need   For  Further   Follow up For  The  Various  Problems Were  discussed. * Results  Of  The  Previous  Diagnostic tests were reviewed and  discussed                 Medical  Decision  Making  Was  Made  Based on the   Complexity  Of  Above  Mentioned  Diagnoses,    Data reviewed             And    Risk  Of  Significant   Co morbidities and   complicating   Factors. Medical  Decision  Was   High    Complexity   Due   To  The  Patient's  Multiple  Symptoms,      Advancing   Disease,  Complex  Treatment  Regimen,  Multiple medications           and   Risk  Of   Side  Effects,  Difficulty  In  Medication  Management  And  Diagnostic  Challenges       In  View  Of  The  Associated   Co  Morbid  Conditions   And  Problems. * FALL   PRECAUTIONS.       THESE  REVIEWED   AND  DISCUSSED      *   ABSOLUTELY   NO  DRIVING        *   BE  CAREFUL  WITH  ACTIVITIES            *   AVOID   BACK  STRAINING  ACTIVITIES          *   ADEQUATE   FLUIDINTAKE   AND  ELECTROLYTE  BALANCE * KEEP  DAIRY  OF   THE  NEUROLOGICAL  SYMPTOMS        RECORDING THE    DURATION  AND  FREQUENCY. *  AVOID    CONDITIONS  AND  FACTORS   THAT  MAKE                  NEUROLOGICAL  SYMPTOMS  WORSE.           *TO  MAINTAIN  REGULAR  SLEEP  WAKE  CYCLES. *   TO  HAVE  ADEQUATE  REST  AND   SLEEP    HOURS.          *    AVOID  ANY USAGE OF    TOBACCO,              EXCESSIVE  ALCOHOL  AND   ILLEGAL   SUBSTANCES          *  CONTINUE   MEDICATIONS    PRESCRIBED   BY NEUROLOGIST    AS    RECOMMENDED       *   Compliance   With  Medications   And  Instructions          * CURRENTLY    TOLERATING  THE  PRESCRIBED   MEDICATIONS. WITHOUT  ANY  SIGNIFICANT  SIDE  EFFECTS   &  GETTING BENEFIT. *    Antiplatelet  therapy    As   Recommended  Was   Discussed          *    Prophylactic  Use   Of     Vitamin   B   Complex,  Folic  Acid,    Vitamin  B12    Multivitamin,       Calcium  With  magnesium  And  Vit D    Supplementations   Over  The  Counter  Discussed               *FOOT  CARE, DAILY  INSPECTION  OF  FEET   AND         PERIODIC  PODIATRY EVALUATIONS . *  PATIENT  IS  ALSO   COUNSELED   TO  KEEP    ACTIVITIES:         A)   SIMPLE      B)  ORGANIZED      C)  WRITEDOWN                   *  EVALUATIONS  AND  FOLLOW UP:                                      *CARDIOLOGY              *   OPTHALMOLOGY                                           *         CURRENTLY  ON   ASA  AND PLAVIX                                         PROPHYLACTIC ALLY           AND                                    TOLERATING  THE  SAME.                            *       H / O     SYNCOPE     IN     FEB. 2021                              PATIENT   WOKE    UP    ON     FLOOR      AS   NOTICED  BY   HIS  WIFE                     *     PATIENT  HAD     ER   VISIT      AND    EVALUATIONS                                  CT   HEAD  ON    02/01/2021       SHOWED                                   A)      NO   ACUTE PATHOLOGY                                    B)    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                                               AND  RIGHT   TEMPORAL  LOBES                                      C)    CHRONIC  CEREBRAL ISCHEMIA                              *    CAROTID  DOPPLER  AND   EEG  IN  April 2021                                  SHOWED    NO    SIGNIFICANT  ABNORMALITIES                                          *       PATIENT   HAD      CARDIOLOGY     EVALUATIONS                             PATIENT    SYMPTOMS      IMPROVED    AFTER    STOPPING                                 HYDRALAZINE                                     NO   FURTHER   RECURRENCE  OF  SYNCOPE.                                     NO  NEW   NEUROLOGICAL    CONCERNS. *PATIENT   TO  FOLLOW  UP  WITH   PRIMARY  CARE         OTHER  CONSULTANTS  AS  BEFORE. *  Maintain   Healthy  Life Style    With   Periodic  Monitoring  Of      Any  Medical  Conditions  Including   Elevated  Blood  Pressure,  Lipid  Profile,     Blood  Sugar levels  AndHeart  Disease. *   Period   Screening  For  Cancers  Involving  Breast,  Colon,    lungs  And  Other  Organs  As  Applicable,  In consultation   With  Your  Primary Care Providers. *Second  Neurological  Opinion  And  Evaluations  In  [facility]  Setting  If  Patient  Is  Interested. * Please   Contact   Neurology  Clinic   Early   If   Are  Any  New  Neurological   And  Any neurological  Concerns.                   *  If  The  Patient remains  Neurologically  Stable   Return   To  [facility] Neurology Department   IN      3 - 6           MONTHS  TIME   FOR  FURTHER              FOLLOW UP.                       *   The  Neurological   Findings,  Possible  Diagnosis,  Differential diagnoses                    And  Options  For    Further   Investigations                   And management   Are  Discussed  Comprehensively. Medications   And  Prescription   Risks  And  Side effects  Are   Also  Discussed. *  If   There is  Any  Significant  Worsening   Of  Current  Symptoms  And  Or                  If patient  Develops   Any additional  New  NeurologicalSymptoms                  Or  Significant  Concerns   Should  Call  911 or  Go  To  Emergency  Department  For  Further  Immediate  Evaluation. The   Above  Were  Reviewed  With  Patient     AND  HIS  WIFE      and                       questions  Answered  In  Detail. More   Than  50% of face  To face Time   Was  Spent  On  Counseling                    And   Coordination  Of  Care   Of   Patient's  multiple   Neurological  Problems                         And   Comorbid  Medical   Conditions. Electronically signed by Ellen Lopez MD.,  [facility]      Board Certified in  Neurology &  In  [facility] of [employer] and Neurology (ABPN)      DISCLAIMER:   Although every effort was made to ensure the accuracy of this  electronictranscription, some errors in transcription may have occurred. GENERAL PATIENT INSTRUCTIONS:      A Healthy Lifestyle: Care Instructions   Your Care Instructions   A healthy lifestyle can help you feel good, stay at ahealthy weight, and have plenty of energy for both work and play. A healthy lifestyle is something you can share with your whole family.  A healthy lifestyle also can lower your risk for serious health problems, such ashigh blood pressure, heart disease, and diabetes.  You can follow a few steps listed below to improve your health and the health of your family.  Follow-up careis a key part of your treatment and safety. Be sure to make and go to all appointments, and call your doctor if you are having problems.  Its also a good idea to know your test results and keep a list of the medicines you take.  How can you care for yourself at home?  Do not eat too much sugar, fat, or fast foods. You can still have dessert and treats nowand then. The goal is moderation.  Start small to improve your eating habits. Pay attention to portion sizes, drink less juice and soda pop, and eat more fruits and vegetables.  Eat a healthy amount of food. A 3-ounce serving of meat, for example, is about the size of a deck of cards. Fill the rest of your plate with vegetables and whole grains.  Limit theamount of soda and sports drinks you have every day. Drink more water when you are thirsty.  Eat at least 5 servings of fruits and vegetables every day. It may seem like a lot, but it is not hard to reach this goal. Aserving or helping is 1 piece of fruit, 1 cup of vegetables, or 2 cups of leafy, raw vegetables. Have an apple or some carrot sticks as an afternoon snack instead of a candy bar. Try to have fruits and/or vegetables at everymeal.   Make exercise part of your daily routine. You may want to start with simple activities, such as walking, bicycling, or slow swimming. Try cesar active 30 to 60 minutes every day. You do not need to do all 30 to 60 minutes all at once. For example, you can exercise 3 times a day for 10 or 20 minutes. Moderate exercise is safe for most people, but it is always agood idea to talk to your doctor before starting an exercise program.   Keep moving. [facility] the lawn, work in the garden, or [employer]. Take the stairs instead of the elevator at work.  If you smoke, quit. Peoplewho smoke have an increased risk for heart attack, stroke, cancer, and other lung illnesses. Quitting is hard, but there are ways to boost your chance of quitting tobacco for good.  Use nicotine gum, patches, or lozenges.  Ask your doctor about stop-smoking programs and medicines.  Keep trying.    In addition to reducing your risk of diseases in the future, you will notice some benefits soon after you stop using tobacco. If you have shortness of breath or asthma symptoms, they will likely getbetter within a few weeks after you quit.  Limit how much alcohol you drink. Moderate amounts of alcohol (up to 2 drinks a day for men, 1drink a day for women) are okay. But drinking too much can lead to liver problems, high blood pressure, and other health problems.  health   If you have a family, there are many things you can do together to improve your health.  Eat meals together as a family as often as possible.  Eat healthy foods. This includes fruits, vegetables, lean meats and dairy, and whole grains.  Include your family in your fitness plan. Most peoplethink of activities such as jogging or tennis as the way to fitness, but there are many ways you and your family can be more active. Anything that makes you breathe hard and gets your heart pumping is exercise. Here are sometips:   Walk to do errands or to take your child to school or the bus.  Go for a family bike ride after dinner instead of watching TV.  Where can you learn more?  Go [email or web address] and sign in to your [employer] account. Enter I423 in the Search HealthInformation box to learn more about \"A Healthy Lifestyle: Care Instructions. \"     If you do not have anaccount, please click on the \"Sign Up Now\" link.  Current as of: July 26, 2016   Content Version: 11.2   © 4043-4577 [employer]. Care instructions adapted under license by [facility]. If you have questions about a medical condition or this instruction, always ask your healthcare professional. [employer] disclaims any warranty or liability for your use of this information.

## 2021-05-26 DIAGNOSIS — M54.50 LOW BACK PAIN: ICD-10-CM

## 2021-05-27 RX ORDER — CYCLOBENZAPRINE HCL 10 MG
TABLET ORAL
Qty: 270 TABLET | Refills: 3 | Status: SHIPPED | OUTPATIENT
Start: 2021-05-27 | End: 2022-03-25 | Stop reason: SDUPTHER

## 2021-06-04 DIAGNOSIS — I10 ESSENTIAL HYPERTENSION: ICD-10-CM

## 2021-06-04 DIAGNOSIS — E11.9 TYPE 2 DIABETES MELLITUS WITHOUT COMPLICATION, WITHOUT LONG-TERM CURRENT USE OF INSULIN (HCC): ICD-10-CM

## 2021-06-04 DIAGNOSIS — E78.5 HYPERLIPIDEMIA, UNSPECIFIED HYPERLIPIDEMIA TYPE: ICD-10-CM

## 2021-06-04 DIAGNOSIS — I63.9 CEREBROVASCULAR ACCIDENT (CVA), UNSPECIFIED MECHANISM (HCC): ICD-10-CM

## 2021-06-07 RX ORDER — LISINOPRIL 20 MG/1
TABLET ORAL
Qty: 90 TABLET | Refills: 3 | Status: SHIPPED | OUTPATIENT
Start: 2021-06-07 | End: 2022-03-25 | Stop reason: SDUPTHER

## 2021-06-07 RX ORDER — AMLODIPINE BESYLATE 10 MG/1
TABLET ORAL
Qty: 90 TABLET | Refills: 3 | Status: SHIPPED | OUTPATIENT
Start: 2021-06-07 | End: 2022-03-25 | Stop reason: SDUPTHER

## 2021-06-07 RX ORDER — ATORVASTATIN CALCIUM 40 MG/1
TABLET, FILM COATED ORAL
Qty: 90 TABLET | Refills: 3 | Status: SHIPPED | OUTPATIENT
Start: 2021-06-07 | End: 2022-03-25 | Stop reason: SDUPTHER

## 2021-06-07 RX ORDER — CLOPIDOGREL BISULFATE 75 MG/1
TABLET ORAL
Qty: 90 TABLET | Refills: 3 | Status: SHIPPED | OUTPATIENT
Start: 2021-06-07 | End: 2022-03-25 | Stop reason: SDUPTHER

## 2021-06-22 ENCOUNTER — OFFICE VISIT (OUTPATIENT)
Dept: INTERNAL MEDICINE | Age: 66
End: 2021-06-22
Payer: MEDICARE

## 2021-06-22 VITALS
HEIGHT: 70 IN | DIASTOLIC BLOOD PRESSURE: 80 MMHG | HEART RATE: 100 BPM | WEIGHT: 226 LBS | SYSTOLIC BLOOD PRESSURE: 118 MMHG | BODY MASS INDEX: 32.35 KG/M2 | RESPIRATION RATE: 16 BRPM

## 2021-06-22 DIAGNOSIS — Z00.00 GENERAL MEDICAL EXAM: Primary | ICD-10-CM

## 2021-06-22 DIAGNOSIS — E55.9 VITAMIN D DEFICIENCY: ICD-10-CM

## 2021-06-22 DIAGNOSIS — Z00.00 MEDICARE ANNUAL WELLNESS VISIT, SUBSEQUENT: ICD-10-CM

## 2021-06-22 DIAGNOSIS — Z00.00 ROUTINE GENERAL MEDICAL EXAMINATION AT A HEALTH CARE FACILITY: ICD-10-CM

## 2021-06-22 DIAGNOSIS — E83.42 HYPOMAGNESEMIA: ICD-10-CM

## 2021-06-22 DIAGNOSIS — E53.8 VITAMIN B12 DEFICIENCY: ICD-10-CM

## 2021-06-22 DIAGNOSIS — Z87.891 PERSONAL HISTORY OF TOBACCO USE: ICD-10-CM

## 2021-06-22 DIAGNOSIS — E78.2 MIXED HYPERLIPIDEMIA: ICD-10-CM

## 2021-06-22 DIAGNOSIS — E11.9 TYPE 2 DIABETES MELLITUS WITHOUT COMPLICATION, WITHOUT LONG-TERM CURRENT USE OF INSULIN (HCC): ICD-10-CM

## 2021-06-22 DIAGNOSIS — I10 ESSENTIAL HYPERTENSION: ICD-10-CM

## 2021-06-22 PROCEDURE — G8417 CALC BMI ABV UP PARAM F/U: HCPCS | Performed by: INTERNAL MEDICINE

## 2021-06-22 PROCEDURE — 99214 OFFICE O/P EST MOD 30 MIN: CPT | Performed by: INTERNAL MEDICINE

## 2021-06-22 PROCEDURE — G0296 VISIT TO DETERM LDCT ELIG: HCPCS | Performed by: INTERNAL MEDICINE

## 2021-06-22 PROCEDURE — 3017F COLORECTAL CA SCREEN DOC REV: CPT | Performed by: INTERNAL MEDICINE

## 2021-06-22 PROCEDURE — 1036F TOBACCO NON-USER: CPT | Performed by: INTERNAL MEDICINE

## 2021-06-22 PROCEDURE — G8427 DOCREV CUR MEDS BY ELIG CLIN: HCPCS | Performed by: INTERNAL MEDICINE

## 2021-06-22 PROCEDURE — G0439 PPPS, SUBSEQ VISIT: HCPCS | Performed by: INTERNAL MEDICINE

## 2021-06-22 PROCEDURE — G0463 HOSPITAL OUTPT CLINIC VISIT: HCPCS | Performed by: INTERNAL MEDICINE

## 2021-06-22 PROCEDURE — 3046F HEMOGLOBIN A1C LEVEL >9.0%: CPT | Performed by: INTERNAL MEDICINE

## 2021-06-22 PROCEDURE — 99397 PER PM REEVAL EST PAT 65+ YR: CPT | Performed by: INTERNAL MEDICINE

## 2021-06-22 PROCEDURE — 2022F DILAT RTA XM EVC RTNOPTHY: CPT | Performed by: INTERNAL MEDICINE

## 2021-06-22 SDOH — ECONOMIC STABILITY: FOOD INSECURITY: WITHIN THE PAST 12 MONTHS, YOU WORRIED THAT YOUR FOOD WOULD RUN OUT BEFORE YOU GOT MONEY TO BUY MORE.: NEVER TRUE

## 2021-06-22 SDOH — ECONOMIC STABILITY: FOOD INSECURITY: WITHIN THE PAST 12 MONTHS, THE FOOD YOU BOUGHT JUST DIDN'T LAST AND YOU DIDN'T HAVE MONEY TO GET MORE.: NEVER TRUE

## 2021-06-22 ASSESSMENT — ENCOUNTER SYMPTOMS
VOMITING: 0
EYE PAIN: 0
NAUSEA: 0
ABDOMINAL PAIN: 0
COUGH: 0
CONSTIPATION: 0
DIARRHEA: 0
SHORTNESS OF BREATH: 0
BACK PAIN: 0
BLOOD IN STOOL: 0

## 2021-06-22 ASSESSMENT — PATIENT HEALTH QUESTIONNAIRE - PHQ9
SUM OF ALL RESPONSES TO PHQ QUESTIONS 1-9: 0
1. LITTLE INTEREST OR PLEASURE IN DOING THINGS: 0
SUM OF ALL RESPONSES TO PHQ9 QUESTIONS 1 & 2: 0
2. FEELING DOWN, DEPRESSED OR HOPELESS: 0
SUM OF ALL RESPONSES TO PHQ QUESTIONS 1-9: 0
SUM OF ALL RESPONSES TO PHQ QUESTIONS 1-9: 0

## 2021-06-22 ASSESSMENT — SOCIAL DETERMINANTS OF HEALTH (SDOH): HOW HARD IS IT FOR YOU TO PAY FOR THE VERY BASICS LIKE FOOD, HOUSING, MEDICAL CARE, AND HEATING?: NOT HARD AT ALL

## 2021-06-22 ASSESSMENT — LIFESTYLE VARIABLES: HOW OFTEN DO YOU HAVE A DRINK CONTAINING ALCOHOL: 0

## 2021-06-22 NOTE — PROGRESS NOTES
Medicare Annual Wellness Visit  Name: Telly Hoang Date: 2021   MRN: [id number] Sex: Male   Age: 61 y.o. Ethnicity: Non-/Non    : 1955 Race: Schuyler Thomas is here for Medicare AWV (6 month, ), Hypertension, Hyperlipidemia, and Diabetes    Screenings for behavioral, psychosocial and functional/safety risks, and cognitive dysfunction are all negative except as indicated below. These results, as well as other patient data from the [address] form, are documented in Flowsheets linked to this Encounter. No Known Allergies      Prior to Visit Medications    Medication Sig Taking?  Authorizing Provider   atorvastatin (LIPITOR) 40 MG tablet TAKE 1 TABLET DAILY Yes Deshawn Brown MD   clopidogrel (PLAVIX) 75 MG tablet TAKE 1 TABLET DAILY Yes Deshawn Brown MD   lisinopril (PRINIVIL;ZESTRIL) 20 MG tablet TAKE 1 TABLET DAILY Yes Deshawn Brown MD   metFORMIN (GLUCOPHAGE) 500 MG tablet TAKE 1 TABLET TWICE Toi Iverson MD   amLODIPine (NORVASC) 10 MG tablet TAKE 1 TABLET DAILY Yes Deshawn Brown MD   donepezil (ARICEPT) 10 MG tablet TAKE 1 TABLET DAILY WITH SUPPER Yes Ave Newton MD   cyclobenzaprine (FLEXERIL) 10 MG tablet TAKE 1 TABLET THREE TIMES A DAY Yes Deshawn Brown MD   aspirin 81 MG tablet Take 81 mg by mouth daily Yes Historical Provider, MD   vitamin B-12 (CYANOCOBALAMIN) 1000 MCG tablet Take 1,000 mcg by mouth daily Yes Historical Provider, MD   Cholecalciferol (VITAMIN D3) 5000 units TABS Take 5,000 Units by mouth daily Yes Historical Provider, MD   vitamin E 400 UNIT capsule Take 400 Units by mouth daily Yes Historical Provider, MD   magnesium oxide (MAG-OX) 400 MG tablet Take 400 mg by mouth daily Yes Historical Provider, MD         Past Medical History:   Diagnosis Date    [facility]     Diabetes mellitus [facility]     History of back surgery     Measles     Osteoarthritis Past Surgical History:   Procedure Laterality Date    BACK SURGERY      HERNIA REPAIR           Family History   Problem Relation Age of Onset    High Blood Pressure Mother     Cataracts Mother     Diabetes Maternal Grandmother        CareTeam (Including outside providers/suppliers regularly involved in providing care):   Patient Care Team:  Bruno Sawant MD as PCP - General (Internal Medicine)  Bruno Sawant MD as PCP - [facility] Empaneled Provider  Oksana Fonseca RN as Nurse Navigator    Wt Readings from Last 3 Encounters:   06/22/21 226 lb (102.5 kg)   05/14/21 226 lb (102.5 kg)   04/19/21 225 lb (102.1 kg)     Vitals:    06/22/21 1539   BP: 118/80   Site: Left Upper Arm   Position: Sitting   Cuff Size: Medium Adult   Pulse: 100   Resp: 16   Weight: 226 lb (102.5 kg)   Height: 5' 10\" (1.778 m)     Body mass index is 32.43 kg/m². Based upon direct observation of the patient, evaluation of cognition reveals none. Patient's complete Health Risk Assessment and screening values have been reviewed and are found in Flowsheets. The following problems were reviewed today and where indicated follow up appointments were made and/or referrals ordered. Positive Risk Factor Screenings with Interventions:            General Health and ACP:  General  In general, how would you say your health is?: Good  In the past 7 days, have you experienced any of the following?  New or Increased Pain, New or Increased Fatigue, Loneliness, Social Isolation, Stress or Anger?: None of These  Do you get the social and emotional support that you need?: Yes  Do you have a Living Will?: Yes  Advance Directives     Power of [address] [place] [address] Will ACP-Advance Directive ACP-Power of     Not on File Not on File Not on File Not on File      General Health Risk Interventions:  · na    Health Habits/Nutrition:  Health Habits/Nutrition  Do you exercise for at least 20 minutes 2-3 times per week?: Yes  Have you lost any weight without trying in the past 3 months?: No  Do you eat only one meal per day?: No  Have you seen the dentist within the past year?: Yes  Body mass index: (!) 32.42  Health Habits/Nutrition Interventions:  · na    Hearing/Vision:  No exam data present  Hearing/Vision  Do you or your family notice any trouble with your hearing that hasn't been managed with hearing aids?: No  Do you have difficulty driving, watching TV, or doing any of your daily activities because of your eyesight?: (!) Yes  Have you had an eye exam within the past year?: Yes  Hearing/Vision Interventions:  · Vision concerns:  has issues with cataracts  · . ADL:  ADLs  In the past 7 days, did you need help from others to take care of any of the following? Laundry, housekeeping, banking/finances, shopping, telephone use, food preparation, transportation, or taking medications?: [employer], Housekeeping, [employer], Shopping, Food Preparation, Transportation, Taking Medications  ADL Interventions:  · wife helps out with his ADL's    Personalized Preventive Plan   Current Health Maintenance Status    There is no immunization history on file for this patient.      Health Maintenance   Topic Date Due    Hepatitis C screen  Never done    Pneumococcal 0-64 years Vaccine (1 of 2 - PPSV23) Never done    COVID-19 Vaccine (1) Never done    HIV screen  Never done    DTaP/Tdap/Td vaccine (1 - Tdap) Never done    Shingles Vaccine (1 of 2) Never done    Colon cancer screen colonoscopy  Never done    Low dose CT lung screening  06/25/2020    Diabetic retinal exam  06/26/2020    Diabetic microalbuminuria test  07/02/2020    Lipid screen  07/02/2020    Annual Wellness Visit (AWV)  Never done    A1C test (Diabetic or Prediabetic)  06/12/2021    Flu vaccine (Season Ended) 09/01/2021    Potassium monitoring  02/01/2022    Creatinine monitoring  02/01/2022    Diabetic foot exam  06/22/2022    Hepatitis A vaccine  Aged Out    Hib vaccine  Aged Out  Meningococcal (ACWY) vaccine  Aged Out     Recommendations for Preventive Services Due: see orders and patient instructions/AVS.  . Recommended screening schedule for the next 5-10 years is provided to the patient in written form: see Patient Instructions/AVS.    IThomas LPN, 3/68/4284, performed the documented evaluation under the direct supervision of the attending physician. I, Dr. Greta Dancer, directly supervised the performance of this Medicare and wellness visit.

## 2021-06-22 NOTE — PROGRESS NOTES
Date Value   2017 52         AST (U/L)   Date Value   2019 13     ALT (U/L)   Date Value   2019 19     BUN (mg/dL)   Date Value   2021 18     BP Readings from Last 3 Encounters:   21 118/80   21 120/82   21 (!) 124/91              Past Medical History:   Diagnosis Date    [facility]     Diabetes mellitus ([facility]     History of back surgery     Measles     Osteoarthritis       Past Surgical History:   Procedure Laterality Date    BACK SURGERY      HERNIA REPAIR         Family History   Problem Relation Age of Onset    High Blood Pressure Mother     Cataracts Mother     Diabetes Maternal Grandmother           Social History     Tobacco Use    Smoking status: Former Smoker     Packs/day: 2.00     Years: 30.00     Pack years: 60.00     Types: Cigarettes     Start date:      Quit date: 2016     Years since quittin.0    Smokeless tobacco: Never Used   Substance Use Topics    Alcohol use: Never         Current Outpatient Medications   Medication Sig Dispense Refill    atorvastatin (LIPITOR) 40 MG tablet TAKE 1 TABLET DAILY 90 tablet 3    clopidogrel (PLAVIX) 75 MG tablet TAKE 1 TABLET DAILY 90 tablet 3    lisinopril (PRINIVIL;ZESTRIL) 20 MG tablet TAKE 1 TABLET DAILY 90 tablet 3    metFORMIN (GLUCOPHAGE) 500 MG tablet TAKE 1 TABLET TWICE A  tablet 3    amLODIPine (NORVASC) 10 MG tablet TAKE 1 TABLET DAILY 90 tablet 3    donepezil (ARICEPT) 10 MG tablet TAKE 1 TABLET DAILY WITH SUPPER 90 tablet 1    cyclobenzaprine (FLEXERIL) 10 MG tablet TAKE 1 TABLET THREE TIMES A  tablet 3    aspirin 81 MG tablet Take 81 mg by mouth daily      vitamin B-12 (CYANOCOBALAMIN) 1000 MCG tablet Take 1,000 mcg by mouth daily      Cholecalciferol (VITAMIN D3) 5000 units TABS Take 5,000 Units by mouth daily      vitamin E 400 UNIT capsule Take 400 Units by mouth daily      magnesium oxide (MAG-OX) 400 MG tablet Take 400 mg by mouth daily       Current Facility-Administered Medications   Medication Dose Route Frequency Provider Last Rate Last Admin    phenylephrine (MYDFRIN) 2.5 % ophthalmic solution 1 drop  1 drop Both Eyes Once Mark Rob MD        tropicamide (MYDRIACYL) 1 % ophthalmic solution 1 drop  1 drop Both Eyes Once Mark Rob MD         No Known Allergies    Health Maintenance   Topic Date Due    Hepatitis C screen  Never done    Pneumococcal 0-64 years Vaccine (1 of 2 - PPSV23) Never done    COVID-19 Vaccine (1) Never done    HIV screen  Never done    DTaP/Tdap/Td vaccine (1 - Tdap) Never done    Shingles Vaccine (1 of 2) Never done    Colon cancer screen colonoscopy  Never done    Low dose CT lung screening  06/25/2020    Diabetic retinal exam  06/26/2020    Diabetic microalbuminuria test  07/02/2020    Lipid screen  07/02/2020    Annual Wellness Visit (AWV)  Never done    A1C test (Diabetic or Prediabetic)  06/12/2021    Flu vaccine (Season Ended) 09/01/2021    Potassium monitoring  02/01/2022    Creatinine monitoring  02/01/2022    Diabetic foot exam  06/22/2022    Hepatitis A vaccine  Aged Out    Hib vaccine  Aged Out    Meningococcal (ACWY) vaccine  Aged Out       Subjective:      Review of Systems   Constitutional: Negative for chills and fever. HENT: Negative for hearing loss. Eyes: Negative for pain and visual disturbance. Respiratory: Negative for cough and shortness of breath. Cardiovascular: Negative for chest pain, palpitations and leg swelling. Gastrointestinal: Negative for abdominal pain, blood in stool, constipation, diarrhea, nausea and vomiting. Endocrine: Negative for cold intolerance, polydipsia and polyuria. Genitourinary: Negative for difficulty urinating, dysuria and hematuria. Musculoskeletal: Negative for arthralgias, back pain, gait problem and neck pain. Skin: Negative for pallor and rash.    Neurological: Negative for dizziness, weakness, numbness and headaches. Hematological: Negative for adenopathy. Does not bruise/bleed easily. Psychiatric/Behavioral: Negative for confusion. The patient is not nervous/anxious. Objective:     Physical Exam  Vitals reviewed. Constitutional:       Appearance: He is well-developed. HENT:      Head: Normocephalic and atraumatic. Eyes:      Pupils: Pupils are equal, round, and reactive to light. Cardiovascular:      Rate and Rhythm: Normal rate and regular rhythm. Heart sounds: No murmur heard. No friction rub. No gallop. Pulmonary:      Effort: Pulmonary effort is normal.      Breath sounds: Normal breath sounds. No wheezing or rales. Abdominal:      General: There is no distension. Palpations: Abdomen is soft. There is no mass. Tenderness: There is no abdominal tenderness. There is no rebound. Musculoskeletal:         General: Normal range of motion. Cervical back: Neck supple. Lymphadenopathy:      Cervical: No cervical adenopathy. Skin:     General: Skin is warm and dry. Findings: No rash. Neurological:      Mental Status: He is alert and oriented to person, place, and time. Cranial Nerves: No cranial nerve deficit (grossly). Comments: Diabetic foot exam is normal including normal monofilament sensory test, normal vibration sense and normal pulses bilaterally. Psychiatric:         Thought Content: Thought content normal.        /80 (Site: Left Upper Arm, Position: Sitting, Cuff Size: Medium Adult)   Pulse 100   Resp 16   Ht 5' 10\" (1.778 m)   Wt 226 lb (102.5 kg)   BMI 32.43 kg/m²     Assessment:       Diagnosis Orders   1. General medical exam  Comprehensive Metabolic Panel, Fasting   2. Essential hypertension  Comprehensive Metabolic Panel, Fasting   3.  Type 2 diabetes mellitus without complication, without long-term current use of insulin (HCC)  Hemoglobin A1C    Microalbumin, Ur    HM DIABETES FOOT EXAM   4. Mixed hyperlipidemia  Lipid Panel 5. Personal history of tobacco use  DE VISIT TO DISCUSS LUNG CA SCREEN W LDCT    CT Lung Screen (Annual)   6. Routine general medical examination at a health care facility     7. Medicare annual wellness visit, subsequent     8. Hypomagnesemia  Magnesium   9. Vitamin D deficiency  Vitamin D 25 Hydroxy   10. Vitamin B12 deficiency  Vitamin B12 & Folate      I reviewed multiple tests for this appointment. 2021 normal hemoglobin 15.9.    2021 normal creatinine 1.08.    2020 okay hemoglobin A1c at 6.2. Patient told to continue Plavix, Lipitor, Metformin and aspirin. Plan:       Return in about 6 months (around 2021) for Hypertension, Hyperlipidemia, Diabetes. Orders Placed This Encounter   Procedures    CT Lung Screen (Annual)     Age: Patient is 61 y.o. Smoking History: Social History    Tobacco Use      Smoking status: Former Smoker        Packs/day: 2.00        Years: 30.00        Pack years: 61        Types: Cigarettes        Start date: 14        Quit date: 2016        Years since quittin.0      Smokeless tobacco: Never Used    Alcohol use: Never    Drug use: Never   Pack years: 61    Date of last lung cancer screenin2019     Standing Status:   Future     Standing Expiration Date:   2022     Order Specific Question:   Is there documentation of shared decision making? Answer:   Yes     Order Specific Question:   Is this a low dose CT or a routine CT? Answer:   Low Dose CT [1]     Order Specific Question:   Is this the first (baseline) CT or an annual exam?     Answer: Annual [2]     Order Specific Question:   Does the patient show any signs or symptoms of lung cancer? Answer:   No     Order Specific Question:   Smoking Status? Answer: Former Smoker [4]     Order Specific Question:   Date quit smoking? (must be within 15 years)     Answer:   2016     Order Specific Question:   Smoking packs per day?      Answer:   2     Order Specific Question:   Years smoking? Answer:   30    Magnesium     Standing Status:   Future     Standing Expiration Date:   6/22/2022    Vitamin D 25 Hydroxy     Standing Status:   Future     Standing Expiration Date:   6/22/2022    Vitamin B12 & Folate     Standing Status:   Future     Standing Expiration Date:   6/22/2022    Comprehensive Metabolic Panel, Fasting     Standing Status:   Future     Standing Expiration Date:   6/22/2022    Hemoglobin A1C     Standing Status:   Future     Standing Expiration Date:   6/22/2022   Garrick Stiven, Ur     Standing Status:   Future     Standing Expiration Date:   6/22/2022    Lipid Panel     Standing Status:   Future     Standing Expiration Date:   6/22/2022     Order Specific Question:   Is Patient Fasting?/# of Hours     Answer:   yes    HM DIABETES FOOT EXAM    UT VISIT TO DISCUSS LUNG CA SCREEN W LDCT     No orders of the defined types were placed in this encounter. Patientgiven educational materials - see patient instructions. Discussed use, benefit,and side effects of prescribed medications. All patient questions answered. Ptvoiced understanding. Reviewed health maintenance. Instructed to continue currentmedications, diet and exercise. Patient agreed with treatment plan. Follow up asdirected.      Electronically signed by Laverne Diaz MD on 6/22/2021 at 3:57 PM

## 2021-06-22 NOTE — PROGRESS NOTES
Low Dose CT (LDCT) Lung Screening criteria met   Age 50-69   Pack year smoking >30   Still smoking or less than 15 year since quit   No sign or symptoms of lung cancer   > 11 months since last LDCT     Risks and benefits of lung cancer screening with LDCT scans discussed:    Significance of positive screen - False-positive LDCT results often occur. 95% of all positive results do not lead to a diagnosis of cancer. Usually further imaging can resolve most false-positive results; however, some patients may require invasive procedures. Over diagnosis risk - 10% to 12% of screen-detected lung cancer cases are over diagnosedthat is, the cancer would not have been detected in the patient's lifetime without the screening. Need for follow up screens annually to continue lung cancer screening effectiveness     Risks associated with radiation from annual LDCT- Radiation exposure is about the same as for a mammogram, which is about 1/3 of the annual background radiation exposure from everyday life. Starting screening at age 54 is not likely to increase cancer risk from radiation exposure. Patients with comorbidities resulting in life expectancy of < 10 years, or that would preclude treatment of an abnormality identified on CT, should not be screened due to lack of benefit. To obtain maximal benefit from this screening, smoking cessation and long-term abstinence from smoking is critical          Medicare Annual Wellness Visit  Name: Shelia Edwards Date: 2021   MRN: [id number] Sex: Male   Age: 61 y.o. Ethnicity: Non-/Non    : 1955 Race: Davene Nissen is here for Medicare AWV (6 month, ), Hypertension, Hyperlipidemia, and Diabetes    Screenings for behavioral, psychosocial and functional/safety risks, and cognitive dysfunction are all negative except as indicated below.  These results, as well as other patient data from the Health Risk Assessment form, are Last 3 Encounters:   06/22/21 226 lb (102.5 kg)   05/14/21 226 lb (102.5 kg)   04/19/21 225 lb (102.1 kg)     Vitals:    06/22/21 1539   BP: 118/80   Site: Left Upper Arm   Position: Sitting   Cuff Size: Medium Adult   Pulse: 100   Resp: 16   Weight: 226 lb (102.5 kg)   Height: 5' 10\" (1.778 m)     Body mass index is 32.43 kg/m². Based upon direct observation of the patient, evaluation of cognition reveals recent and remote memory intact. Patient's complete Health Risk Assessment and screening values have been reviewed and are found in Flowsheets. The following problems were reviewed today and where indicated follow up appointments were made and/or referrals ordered. Positive Risk Factor Screenings with Interventions:          General Health and ACP:  General  In general, how would you say your health is?: Good  In the past 7 days, have you experienced any of the following?  New or Increased Pain, New or Increased Fatigue, Loneliness, Social Isolation, Stress or Anger?: None of These  Do you get the social and emotional support that you need?: Yes  Do you have a Living Will?: Yes  Advance Directives     Power of [address] Will ACP-Advance Directive ACP-Power of     Not on File Not on File Not on File Not on File      General Health Risk Interventions:  · No Living Will: Patient declines ACP discussion/assistance    Health Habits/Nutrition:  Health Habits/Nutrition  Do you exercise for at least 20 minutes 2-3 times per week?: Yes  Have you lost any weight without trying in the past 3 months?: No  Do you eat only one meal per day?: No  Have you seen the dentist within the past year?: Yes  Body mass index: (!) 32.42  Health Habits/Nutrition Interventions:  · Inadequate physical activity:  patient is not ready to increase his/her physical activity level at this time    Hearing/Vision:  No exam data present  Hearing/Vision  Do you or your family notice any trouble with your hearing that hasn't been next 5-10 years is provided to the patient in written form: see Patient Instructions/AVS.    Rojelio WANG LPN, 9/14/6313, performed the documented evaluation under the direct supervision of the attending physician.

## 2021-06-22 NOTE — PATIENT INSTRUCTIONS
for Denise Chambers - 6/22/2021  Medicare offers a range of preventive health benefits. Some of the tests and screenings are paid in full while other may be subject to a deductible, co-insurance, and/or copay. Some of these benefits include a comprehensive review of your medical history including lifestyle, illnesses that may run in your family, and various assessments and screenings as appropriate. After reviewing your medical record and screening and assessments performed today your provider may have ordered immunizations, labs, imaging, and/or referrals for you. A list of these orders (if applicable) as well as your Preventive Care list are included within your After Visit Summary for your review. Other Preventive Recommendations:    · A preventive eye exam performed by an eye specialist is recommended every 1-2 years to screen for glaucoma; cataracts, macular degeneration, and other eye disorders. · A preventive dental visit is recommended every 6 months. · Try to get at least 150 minutes of exercise per week or 10,000 steps per day on a pedometer . · Order or download the FREE \"Exercise & Physical Activity: Your Everyday Guide\" from [employer]. Call 1-867.812.2979 or search [employer] online. · You need 0205-9305 mg of calcium and 4877-2061 IU of vitamin D per day. It is possible to meet your calcium requirement with diet alone, but a vitamin D supplement is usually necessary to meet this goal.  · When exposed to the sun, use a sunscreen that protects against both UVA and UVB radiation with an SPF of 30 or greater. Reapply every 2 to 3 hours or after sweating, drying off with a towel, or swimming. · Always wear a seat belt when traveling in a car. Always wear a helmet when riding a bicycle or motorcycle. Personalized Preventive Plan for Denise Chambers - 6/22/2021  Medicare offers a range of preventive health benefits.  Some of the tests and screenings are paid in full while other may be subject to a deductible, co-insurance, and/or copay. Some of these benefits include a comprehensive review of your medical history including lifestyle, illnesses that may run in your family, and various assessments and screenings as appropriate. After reviewing your medical record and screening and assessments performed today your provider may have ordered immunizations, labs, imaging, and/or referrals for you. A list of these orders (if applicable) as well as your Preventive Care list are included within your After Visit Summary for your review. Other Preventive Recommendations:    A preventive eye exam performed by an eye specialist is recommended every 1-2 years to screen for glaucoma; cataracts, macular degeneration, and other eye disorders. A preventive dental visit is recommended every 6 months. Try to get at least 150 minutes of exercise per week or 10,000 steps per day on a pedometer . Order or download the FREE \"Exercise & Physical Activity: Your Everyday Guide\" from The [employer]. Call 1-879.571.2067 or search The [employer] online. You need 6397-4093 mg of calcium and 0617-6655 IU of vitamin D per day. It is possible to meet your calcium requirement with diet alone, but a vitamin D supplement is usually necessary to meet this goal.  When exposed to the sun, use a sunscreen that protects against both UVA and UVB radiation with an SPF of 30 or greater. Reapply every 2 to 3 hours or after sweating, drying off with a towel, or swimming. Always wear a seat belt when traveling in a car. Always wear a helmet when riding a bicycle or motorcycle.

## 2021-06-28 ENCOUNTER — TELEPHONE (OUTPATIENT)
Dept: ONCOLOGY | Age: 66
End: 2021-06-28

## 2021-06-28 NOTE — LETTER
6/28/2021        Yamilet Molina  [address] [place]  [address] [facility] [address] [place] [address]    Dear Yamilet Molina: Your healthcare provider has ordered a low dose CT scan of the chest for lung cancer screening. You will find enclosed, information about CT lung screening. Please review the statement of understanding, you will be asked to sign a copy of this at the time of your CT scan    If you have not already been contacted to make the appointment for your scan, please call our scheduling department at [phone]    Keep in mind that CT lung screening does not take the place of smoking cessation. If you are a current smoker, you will find enclosed smoking cessation resources. Please do not hesitate to contact me if you have any questions or concerns.     [address] [facility],      [facility]  [phone]

## 2021-07-01 ENCOUNTER — HOSPITAL ENCOUNTER (OUTPATIENT)
Dept: CT IMAGING | Age: 66
Discharge: HOME | End: 2021-07-03
Payer: MEDICARE

## 2021-07-01 DIAGNOSIS — Z87.891 PERSONAL HISTORY OF TOBACCO USE: ICD-10-CM

## 2021-07-01 PROCEDURE — 71271 CT THORAX LUNG CANCER SCR C-: CPT

## 2021-07-06 ENCOUNTER — OFFICE VISIT (OUTPATIENT)
Dept: OPTOMETRY | Age: 66
End: 2021-07-06
Payer: MEDICARE

## 2021-07-06 DIAGNOSIS — E11.9 NON-INSULIN DEPENDENT TYPE 2 DIABETES MELLITUS (HCC): Primary | ICD-10-CM

## 2021-07-06 DIAGNOSIS — H52.13 MYOPIA OF BOTH EYES WITH ASTIGMATISM AND PRESBYOPIA: ICD-10-CM

## 2021-07-06 DIAGNOSIS — H53.40 VISUAL FIELD DEFECT: ICD-10-CM

## 2021-07-06 DIAGNOSIS — H25.13 NUCLEAR SCLEROSIS OF BOTH EYES: ICD-10-CM

## 2021-07-06 DIAGNOSIS — H52.203 MYOPIA OF BOTH EYES WITH ASTIGMATISM AND PRESBYOPIA: ICD-10-CM

## 2021-07-06 DIAGNOSIS — H52.4 MYOPIA OF BOTH EYES WITH ASTIGMATISM AND PRESBYOPIA: ICD-10-CM

## 2021-07-06 PROCEDURE — 3017F COLORECTAL CA SCREEN DOC REV: CPT | Performed by: OPTOMETRIST

## 2021-07-06 PROCEDURE — 2022F DILAT RTA XM EVC RTNOPTHY: CPT | Performed by: OPTOMETRIST

## 2021-07-06 PROCEDURE — G8417 CALC BMI ABV UP PARAM F/U: HCPCS | Performed by: OPTOMETRIST

## 2021-07-06 PROCEDURE — G8427 DOCREV CUR MEDS BY ELIG CLIN: HCPCS | Performed by: OPTOMETRIST

## 2021-07-06 PROCEDURE — 1036F TOBACCO NON-USER: CPT | Performed by: OPTOMETRIST

## 2021-07-06 PROCEDURE — 92250 FUNDUS PHOTOGRAPHY W/I&R: CPT | Performed by: OPTOMETRIST

## 2021-07-06 PROCEDURE — 3046F HEMOGLOBIN A1C LEVEL >9.0%: CPT | Performed by: OPTOMETRIST

## 2021-07-06 PROCEDURE — 99204 OFFICE O/P NEW MOD 45 MIN: CPT | Performed by: OPTOMETRIST

## 2021-07-06 RX ORDER — TROPICAMIDE 10 MG/ML
1 SOLUTION/ DROPS OPHTHALMIC ONCE
Status: COMPLETED | OUTPATIENT
Start: 2021-07-06 | End: 2021-07-06

## 2021-07-06 RX ADMIN — TROPICAMIDE 1 DROP: 10 SOLUTION/ DROPS OPHTHALMIC at 15:48

## 2021-07-06 ASSESSMENT — ENCOUNTER SYMPTOMS
EYES NEGATIVE: 0
RESPIRATORY NEGATIVE: 0
GASTROINTESTINAL NEGATIVE: 0
ALLERGIC/IMMUNOLOGIC NEGATIVE: 0

## 2021-07-06 ASSESSMENT — TONOMETRY
OD_IOP_MMHG: 17
OS_IOP_MMHG: 20
IOP_METHOD: NON-CONTACT AIR PUFF

## 2021-07-06 ASSESSMENT — REFRACTION_MANIFEST
OD_AXIS: 088
OS_CYLINDER: -0.50
OD_ADD: +2.50
OD_CYLINDER: -0.50
OD_SPHERE: -3.25
OS_ADD: +2.50
OS_AXIS: 175
OS_SPHERE: -3.00

## 2021-07-06 ASSESSMENT — REFRACTION_WEARINGRX
OD_CYLINDER: -0.50
OS_CYLINDER: -0.50
SPECS_TYPE: SVL
OD_SPHERE: -3.75
OD_AXIS: 087
OS_AXIS: 024
OS_SPHERE: -3.50

## 2021-07-06 ASSESSMENT — VISUAL ACUITY
OD_CC+: -2
CORRECTION_TYPE: GLASSES
OS_CC: 20/50
METHOD: SNELLEN - LINEAR

## 2021-07-06 ASSESSMENT — KERATOMETRY: METHOD_AUTO_MANUAL: AUTOMATED

## 2021-07-06 ASSESSMENT — SLIT LAMP EXAM - LIDS
COMMENTS: NORMAL
COMMENTS: NORMAL

## 2021-07-06 NOTE — PATIENT INSTRUCTIONS
New glasses recommended  May do distance only and take the glasses off to read if desires  Keep yearly appointments because of diabetes

## 2021-07-06 NOTE — PROGRESS NOTES
Kamini Lowry presents today for   Chief Complaint   Patient presents with    Blurred Vision    Ophth Diabetic Exam    Vision Exam   .    HPI     Blurred Vision     In both eyes. Vision is blurred. Context:  distance vision and reading. Comments     Last Vision Exam: 6/26/19 Duane Rung  Last Ophthalmology Exam: Анна  Last Filled Glasses Rx: 2015  Insurance: Medicare   Update: DM Exam, update glasses   Trouble reading ; blinks a lot   Has not had new glasses since his stroke 5 yrs ago  Diabetic:  Sugars: 115-125 pretty consistent. HmgA1C: 6.2  June 2020                   Current Outpatient Medications   Medication Sig Dispense Refill    atorvastatin (LIPITOR) 40 MG tablet TAKE 1 TABLET DAILY 90 tablet 3    clopidogrel (PLAVIX) 75 MG tablet TAKE 1 TABLET DAILY 90 tablet 3    lisinopril (PRINIVIL;ZESTRIL) 20 MG tablet TAKE 1 TABLET DAILY 90 tablet 3    metFORMIN (GLUCOPHAGE) 500 MG tablet TAKE 1 TABLET TWICE A  tablet 3    amLODIPine (NORVASC) 10 MG tablet TAKE 1 TABLET DAILY 90 tablet 3    donepezil (ARICEPT) 10 MG tablet TAKE 1 TABLET DAILY WITH SUPPER 90 tablet 1    cyclobenzaprine (FLEXERIL) 10 MG tablet TAKE 1 TABLET THREE TIMES A  tablet 3    aspirin 81 MG tablet Take 81 mg by mouth daily      vitamin B-12 (CYANOCOBALAMIN) 1000 MCG tablet Take 1,000 mcg by mouth daily      Cholecalciferol (VITAMIN D3) 5000 units TABS Take 5,000 Units by mouth daily      vitamin E 400 UNIT capsule Take 400 Units by mouth daily      magnesium oxide (MAG-OX) 400 MG tablet Take 400 mg by mouth daily       No current facility-administered medications for this visit.      ROS     Positive for: Endocrine    Negative for: Constitutional, Gastrointestinal, Neurological, Skin, Genitourinary, Musculoskeletal, HENT, Cardiovascular, Eyes, Respiratory, Psychiatric, Allergic/Imm, Heme/Lymph          Family History   Problem Relation Age of Onset    High Blood Pressure Mother     Cataracts Mother Conjunctiva/Sclera White and quiet White and quiet    Cornea Clear Clear    Anterior Chamber Deep and quiet Deep and quiet    Iris Round and reactive Round and reactive    Lens 2+ Nuclear sclerosis-1 2+ Nuclear sclerosis-1    Vitreous Normal Normal          Fundus Exam       Right Left    Disc Normal Normal    C/D Ratio 0.35 0.35    Macula Normal Normal    Vessels Normal Normal    Periphery Normal Normal                  Tonometry     Tonometry (Non-contact air puff, 3:16 PM)       Right Left    Pressure 17 20   IOP.2             -  CH:  11.0          -  WS: 5.1          -                   Visual Acuity (Snellen - Linear)       Right Left    Dist cc 20/50 -2 20/50    Correction: Glasses        Keratometry     Keratometry     Automated              Pupils     Pupils       Pupils    Right PERRL    Left PERRL               Not recorded         Not recorded          Ophthalmology Exam     Wearing Rx       Sphere Cylinder Axis    Right -3.75 -0.50 087    Left -3.50 -0.50 024    Age: 7yrs    Type: SVL                Manifest Refraction     Manifest Refraction       Sphere Cylinder Axis Dist VA Add    Right -3.25 -0.50 088 20/50 +2.50    Left -3.00 -0.50 175 20/50 +2.50          Manifest Refraction #2 (Auto)       Sphere Cylinder Axis Dist VA Add    Right -3.25 -0.50 104      Left -2.75 -0.50 176                 Final Rx       Sphere Cylinder Axis Add    Right -3.50 -0.50 088 +2.50    Left -3.25 -0.50 175 +2.50    Type: patient may prefer distance only sv           Neuro/Psych     Neuro/Psych     Oriented x3: Yes    Mood/Affect: Normal                Orders Placed This Encounter   Procedures    HM DIABETES EYE EXAM    Color Fundus Photography-OU-Both Eyes   No diabetic retinopathy     IMPRESSION:  1. Non-insulin dependent type 2 diabetes mellitus ([address]    2. Myopia of both eyes with astigmatism and presbyopia    3. Visual field defect    4. Nuclear sclerosis of both eyes        PLAN:    1.  Discussed the patient's diagnosis of diabetes and the impact this can have on their ocular health, potentially even leading to permanent blindness. I discussed with the patient the importance of continued follow-up and management with their primary care physician to control their glycemic, blood pressure, and lipid levels. The patient verbalized understanding. 2. New glasses if desired   3 . No treatment as some visual field defects are resulting from previous stroke   4. Monitor for future progression and visual significance. Counseled patient that more glare may be noticed and more light may be needed for reading.     Glycemic control as per PCP   Patient Instructions   New glasses recommended  May do distance only and take the glasses off to read if desires  Keep yearly appointments because of diabetes        Return in about 1 year (around 7/6/2022) for complete eye exam.

## 2021-07-13 ENCOUNTER — HOSPITAL ENCOUNTER (OUTPATIENT)
Dept: LAB | Age: 66
Discharge: HOME | End: 2021-07-13
Payer: MEDICARE

## 2021-07-13 DIAGNOSIS — E53.8 VITAMIN B12 DEFICIENCY: ICD-10-CM

## 2021-07-13 DIAGNOSIS — E83.42 HYPOMAGNESEMIA: ICD-10-CM

## 2021-07-13 DIAGNOSIS — E11.9 TYPE 2 DIABETES MELLITUS WITHOUT COMPLICATION, WITHOUT LONG-TERM CURRENT USE OF INSULIN (HCC): ICD-10-CM

## 2021-07-13 DIAGNOSIS — I10 ESSENTIAL HYPERTENSION: ICD-10-CM

## 2021-07-13 DIAGNOSIS — Z00.00 GENERAL MEDICAL EXAM: ICD-10-CM

## 2021-07-13 DIAGNOSIS — E55.9 VITAMIN D DEFICIENCY: ICD-10-CM

## 2021-07-13 DIAGNOSIS — E78.2 MIXED HYPERLIPIDEMIA: ICD-10-CM

## 2021-07-13 LAB
ALBUMIN SERPL-MCNC: 4.7 G/DL (ref 3.5–5.2)
ALBUMIN/GLOBULIN RATIO: 2 (ref 1–2.5)
ALP BLD-CCNC: 80 U/L (ref 40–129)
ALT SERPL-CCNC: 26 U/L (ref 5–41)
ANION GAP SERPL CALCULATED.3IONS-SCNC: 10 MMOL/L (ref 9–17)
AST SERPL-CCNC: 17 U/L
BILIRUB SERPL-MCNC: 0.36 MG/DL (ref 0.3–1.2)
BUN BLDV-MCNC: 15 MG/DL (ref 8–23)
BUN/CREAT BLD: 14 (ref 9–20)
CALCIUM SERPL-MCNC: 9.3 MG/DL (ref 8.6–10.4)
CHLORIDE BLD-SCNC: 101 MMOL/L (ref 98–107)
CHOLESTEROL/HDL RATIO: 5
CHOLESTEROL: 185 MG/DL
CO2: 28 MMOL/L (ref 20–31)
CREAT SERPL-MCNC: 1.09 MG/DL (ref 0.7–1.2)
CREATININE URINE: 90.8 MG/DL (ref 39–259)
ESTIMATED AVERAGE GLUCOSE: 137 MG/DL
FOLATE: 15.1 NG/ML
GFR AFRICAN AMERICAN: >60 ML/MIN
GFR NON-AFRICAN AMERICAN: >60 ML/MIN
GFR SERPL CREATININE-BSD FRML MDRD: ABNORMAL ML/MIN/{1.73_M2}
GFR SERPL CREATININE-BSD FRML MDRD: ABNORMAL ML/MIN/{1.73_M2}
GLUCOSE FASTING: 124 MG/DL (ref 70–99)
HBA1C MFR BLD: 6.4 % (ref 4–6)
HDLC SERPL-MCNC: 37 MG/DL
LDL CHOLESTEROL DIRECT: 88 MG/DL
LDL CHOLESTEROL: ABNORMAL MG/DL (ref 0–130)
MAGNESIUM: 2 MG/DL (ref 1.6–2.6)
MICROALBUMIN/CREAT 24H UR: <12 MG/L
MICROALBUMIN/CREAT UR-RTO: NORMAL MCG/MG CREAT
POTASSIUM SERPL-SCNC: 4.5 MMOL/L (ref 3.7–5.3)
SODIUM BLD-SCNC: 139 MMOL/L (ref 135–144)
TOTAL PROTEIN: 7 G/DL (ref 6.4–8.3)
TRIGL SERPL-MCNC: 421 MG/DL
VITAMIN B-12: 634 PG/ML (ref 232–1245)
VITAMIN D 25-HYDROXY: 72.6 NG/ML (ref 30–100)
VLDLC SERPL CALC-MCNC: ABNORMAL MG/DL (ref 1–30)

## 2021-07-13 PROCEDURE — 82746 ASSAY OF FOLIC ACID SERUM: CPT

## 2021-07-13 PROCEDURE — 80061 LIPID PANEL: CPT

## 2021-07-13 PROCEDURE — 36415 COLL VENOUS BLD VENIPUNCTURE: CPT

## 2021-07-13 PROCEDURE — 82570 ASSAY OF URINE CREATININE: CPT

## 2021-07-13 PROCEDURE — 82607 VITAMIN B-12: CPT

## 2021-07-13 PROCEDURE — 83735 ASSAY OF MAGNESIUM: CPT

## 2021-07-13 PROCEDURE — 82306 VITAMIN D 25 HYDROXY: CPT

## 2021-07-13 PROCEDURE — 83036 HEMOGLOBIN GLYCOSYLATED A1C: CPT

## 2021-07-13 PROCEDURE — 82043 UR ALBUMIN QUANTITATIVE: CPT

## 2021-07-13 PROCEDURE — 83721 ASSAY OF BLOOD LIPOPROTEIN: CPT

## 2021-07-13 PROCEDURE — 80053 COMPREHEN METABOLIC PANEL: CPT

## 2021-09-20 ENCOUNTER — HOSPITAL ENCOUNTER (OUTPATIENT)
Dept: LAB | Age: 66
Discharge: HOME | End: 2021-09-20
Payer: MEDICARE

## 2021-09-20 DIAGNOSIS — R97.20 ELEVATED PSA: ICD-10-CM

## 2021-09-20 LAB — PROSTATE SPECIFIC ANTIGEN: 1.64 UG/L

## 2021-09-20 PROCEDURE — 36415 COLL VENOUS BLD VENIPUNCTURE: CPT

## 2021-09-20 PROCEDURE — 84153 ASSAY OF PSA TOTAL: CPT

## 2021-10-04 ENCOUNTER — OFFICE VISIT (OUTPATIENT)
Dept: UROLOGY | Age: 66
End: 2021-10-04
Payer: MEDICARE

## 2021-10-04 VITALS
HEART RATE: 102 BPM | HEIGHT: 70 IN | OXYGEN SATURATION: 96 % | SYSTOLIC BLOOD PRESSURE: 134 MMHG | DIASTOLIC BLOOD PRESSURE: 82 MMHG | WEIGHT: 227 LBS | BODY MASS INDEX: 32.5 KG/M2

## 2021-10-04 DIAGNOSIS — N52.9 ERECTILE DYSFUNCTION, UNSPECIFIED ERECTILE DYSFUNCTION TYPE: ICD-10-CM

## 2021-10-04 DIAGNOSIS — R97.20 ELEVATED PSA: Primary | ICD-10-CM

## 2021-10-04 PROCEDURE — 3017F COLORECTAL CA SCREEN DOC REV: CPT | Performed by: UROLOGY

## 2021-10-04 PROCEDURE — G8417 CALC BMI ABV UP PARAM F/U: HCPCS | Performed by: UROLOGY

## 2021-10-04 PROCEDURE — G8484 FLU IMMUNIZE NO ADMIN: HCPCS | Performed by: UROLOGY

## 2021-10-04 PROCEDURE — 99213 OFFICE O/P EST LOW 20 MIN: CPT | Performed by: UROLOGY

## 2021-10-04 PROCEDURE — 99214 OFFICE O/P EST MOD 30 MIN: CPT | Performed by: UROLOGY

## 2021-10-04 PROCEDURE — 1036F TOBACCO NON-USER: CPT | Performed by: UROLOGY

## 2021-10-04 PROCEDURE — G8427 DOCREV CUR MEDS BY ELIG CLIN: HCPCS | Performed by: UROLOGY

## 2021-10-04 RX ORDER — SILDENAFIL 100 MG/1
100 TABLET, FILM COATED ORAL DAILY PRN
Qty: 30 TABLET | Refills: 3 | Status: SHIPPED | OUTPATIENT
Start: 2021-10-04 | End: 2022-10-24

## 2021-10-04 NOTE — PROGRESS NOTES
Amanda Alston MD        [address] [facility]  [address]  [place] [address] [place]  Dept: [phone]  Dept Fax: [phone]  Loc: [phone]      [facility] Urology Office Note - Follow up patient    Patient:  Chaya Suarez  YOB: 1955  Date: 10/4/2021    The patient is a 61 y.o. male who presents today for evaluation of the following problems:   Chief Complaint   Patient presents with    Other     1 year PSA    referred/consultation requested by Tejas Prieto MD.    HISTORY OF PRESENT ILLNESS:     Elevated PSA  Doing well, no complaints  PSA has been stable    ED- worsening. Interested in starting meds. Summary of Previous Records:  Memory issues from previous stroke in 2016. Former smoker  No problems with voiding  No family hx of prostate cancer      Requested/reviewed records from Tejas Prieto MD office and/or outside physician/EMR    (Patient's old records have been requested, reviewed and pertinent findings summarized in today's note.)    Procedures Today: N/A      Last several PSA's:  Lab Results   Component Value Date    PSA 1.64 09/20/2021    PSA 1.66 09/21/2020    PSA 1.73 08/16/2019       Last total testosterone:  No results found for: TESTOSTERONE    Urinalysis today:  No results found for this visit on 10/04/21.     Last BUN and creatinine:  Lab Results   Component Value Date    BUN 15 07/13/2021     Lab Results   Component Value Date    CREATININE 1.09 07/13/2021       Additional Lab/Culture results: none    Imaging Reviewed during this Office Visit:   Amanda Alston MD independently reviewed the images and verified the radiology reports from:    Ct Head Wo Contrast    Result Date: 7/12/2019  EXAMINATION: CT OF THE HEAD WITHOUT CONTRAST  7/12/2019 4:17 pm TECHNIQUE: CT of the head was performed without the administration of intravenous contrast. Dose modulation, iterative reconstruction, and/or weight based adjustment of the mA/kV was utilized to reduce the radiation dose to as low as reasonably achievable. COMPARISON: None. HISTORY: ORDERING SYSTEM PROVIDED HISTORY: Right homonymous hemianopsia TECHNOLOGIST PROVIDED HISTORY: history of CVA Reason for Exam: Balance and memory loss with vision changes. History of CVA 1 year ago Acuity: Chronic Type of Exam: Initial FINDINGS: BRAIN/VENTRICLES: There is no acute intracranial hemorrhage, mass effect or midline shift. No abnormal extra-axial fluid collection. The gray-white differentiation is maintained without evidence of an acute infarct. There is no evidence of hydrocephalus. Old ischemic strokes with resultant encephalomalacia in the left occipital lobe and posterior aspect of right temporal lobe. ORBITS: The visualized portion of the orbits demonstrate no acute abnormality. SINUSES: The visualized paranasal sinuses and mastoid air cells demonstrate no acute abnormality. SOFT TISSUES/SKULL:  No acute abnormality of the visualized skull or soft tissues. Small sebaceous cyst with calcifications in the subcutaneous fat of the posterior parietal scalp along the midline. No acute intracranial abnormality. Old ischemic strokes with resultant encephalomalacia in the left occipital lobe and posterior aspect of right temporal lobe. Vl Dup Carotid Bilateral    Result Date: 7/12/2019  EXAMINATION: ULTRASOUND EVALUATION OF THE CAROTID ARTERIES 7/12/2019 COMPARISON: None. HISTORY: ORDERING SYSTEM PROVIDED HISTORY: Right homonymous hemianopsia TECHNOLOGIST PROVIDED HISTORY: history of CVA Reason for Exam: right homonymous hemianopsia Acuity: Chronic Type of Exam: Subsequent/Follow-up FINDINGS: RIGHT: The right common carotid artery demonstrates peak systolic velocities of 65, 61 cm/sec in the proximal and distal segments respectively. The right internal carotid artery demonstrates the systolic velocities of 74, 60, 38 cm/sec in the proximal, mid and distal segments respectively.  The external carotid artery is patent. The vertebral artery demonstrates normal antegrade flow. No evidence of focal atherosclerotic plaque. ICA/CCA ratio of 1.1. LEFT: The left common carotid artery demonstrates peak systolic velocities of 67, 70 cm/sec in the proximal and distal segments respectively. The left internal carotid artery demonstrates the systolic velocities of 43, 50, 50 cm/sec in the proximal, mid and distal segments respectively. The external carotid artery is patent. The vertebral artery demonstrates normal antegrade flow. A focus of mild smooth plaque is noted at the origin of the internal carotid artery. ICA/CCA ratio of 0.7. The right internal carotid artery demonstrates 0-50% stenosis. The left internal carotid artery demonstrates 0-50% stenosis. Bilateral vertebral arteries are patent with flow in the normal direction.        PAST MEDICAL, FAMILY AND SOCIAL HISTORY:  Past Medical History:   Diagnosis Date    [facility] [address]     Diabetes mellitus ([facility] [address]     History of back surgery     Measles     Osteoarthritis      Past Surgical History:   Procedure Laterality Date    BACK SURGERY      HERNIA REPAIR       Family History   Problem Relation Age of Onset    High Blood Pressure Mother     Cataracts Mother     Diabetes Maternal Grandmother      Outpatient Medications Marked as Taking for the 10/4/21 encounter (Office Visit) with Keith Mcclellan MD   Medication Sig Dispense Refill    atorvastatin (LIPITOR) 40 MG tablet TAKE 1 TABLET DAILY 90 tablet 3    clopidogrel (PLAVIX) 75 MG tablet TAKE 1 TABLET DAILY 90 tablet 3    lisinopril (PRINIVIL;ZESTRIL) 20 MG tablet TAKE 1 TABLET DAILY 90 tablet 3    metFORMIN (GLUCOPHAGE) 500 MG tablet TAKE 1 TABLET TWICE A  tablet 3    amLODIPine (NORVASC) 10 MG tablet TAKE 1 TABLET DAILY 90 tablet 3    donepezil (ARICEPT) 10 MG tablet TAKE 1 TABLET DAILY WITH SUPPER 90 tablet 1    cyclobenzaprine (FLEXERIL) 10 MG tablet TAKE 1 TABLET THREE TIMES A  tablet 3    aspirin 81 MG tablet Take 81 mg by mouth daily      vitamin B-12 (CYANOCOBALAMIN) 1000 MCG tablet Take 1,000 mcg by mouth daily      Cholecalciferol (VITAMIN D3) 5000 units TABS Take 5,000 Units by mouth daily      vitamin E 400 UNIT capsule Take 400 Units by mouth daily      magnesium oxide (MAG-OX) 400 MG tablet Take 400 mg by mouth daily         Patient has no known allergies. Social History     Tobacco Use   Smoking Status Former Smoker    Packs/day: 2.00    Years: 30.00    Pack years: 60.00    Types: Cigarettes    Start date:     Quit date: 2016    Years since quittin.3   Smokeless Tobacco Never Used      (If patient a smoker, smoking cessation counseling offered)   Social History     Substance and Sexual Activity   Alcohol Use Never       REVIEW OF SYSTEMS:  Constitutional: negative  Eyes: negative  Respiratory: negative  Cardiovascular: negative  Gastrointestinal: negative  Genitourinary: see HPI  Musculoskeletal: negative  Skin: negative   Neurological: negative  Hematological/Lymphatic: negative  Psychological: negative        Physical Exam:    This a 61 y.o. male  Vitals:    10/04/21 1159   BP: 134/82   Pulse: 102   SpO2: 96%     Body mass index is 32.57 kg/m². Constitutional: Patient in no acute distress;     Assessment and Plan        1. Elevated PSA    2. Erectile dysfunction, unspecified erectile dysfunction type               Plan:       ED bothered and wants to start meds. Sildenafil PRN    Doing well. Needs PSA annually    No voiding issues. One year for med check and psa    Prescriptions Ordered:  No orders of the defined types were placed in this encounter.      Orders Placed:  Orders Placed This Encounter   Procedures    PSA, Diagnostic     Standing Status:   Future     Standing Expiration Date:   10/4/2023            Leesa Enriquez MD

## 2021-10-18 ENCOUNTER — OFFICE VISIT (OUTPATIENT)
Dept: CARDIOLOGY | Age: 66
End: 2021-10-18
Payer: MEDICARE

## 2021-10-18 VITALS
OXYGEN SATURATION: 96 % | DIASTOLIC BLOOD PRESSURE: 89 MMHG | BODY MASS INDEX: 32.44 KG/M2 | HEART RATE: 98 BPM | SYSTOLIC BLOOD PRESSURE: 135 MMHG | HEIGHT: 70 IN | WEIGHT: 226.6 LBS

## 2021-10-18 DIAGNOSIS — E78.2 MIXED HYPERLIPIDEMIA: ICD-10-CM

## 2021-10-18 DIAGNOSIS — R55 SYNCOPE, UNSPECIFIED SYNCOPE TYPE: ICD-10-CM

## 2021-10-18 DIAGNOSIS — I10 ESSENTIAL HYPERTENSION: Primary | ICD-10-CM

## 2021-10-18 DIAGNOSIS — I63.00 CEREBROVASCULAR ACCIDENT (CVA) DUE TO THROMBOSIS OF PRECEREBRAL ARTERY (HCC): ICD-10-CM

## 2021-10-18 PROCEDURE — 99213 OFFICE O/P EST LOW 20 MIN: CPT | Performed by: INTERNAL MEDICINE

## 2021-10-18 PROCEDURE — 93005 ELECTROCARDIOGRAM TRACING: CPT | Performed by: INTERNAL MEDICINE

## 2021-10-18 PROCEDURE — 99214 OFFICE O/P EST MOD 30 MIN: CPT | Performed by: INTERNAL MEDICINE

## 2021-10-18 PROCEDURE — 93010 ELECTROCARDIOGRAM REPORT: CPT | Performed by: INTERNAL MEDICINE

## 2021-10-18 PROCEDURE — G8417 CALC BMI ABV UP PARAM F/U: HCPCS | Performed by: INTERNAL MEDICINE

## 2021-10-18 PROCEDURE — G8427 DOCREV CUR MEDS BY ELIG CLIN: HCPCS | Performed by: INTERNAL MEDICINE

## 2021-10-18 PROCEDURE — 1036F TOBACCO NON-USER: CPT | Performed by: INTERNAL MEDICINE

## 2021-10-18 PROCEDURE — 3017F COLORECTAL CA SCREEN DOC REV: CPT | Performed by: INTERNAL MEDICINE

## 2021-10-18 PROCEDURE — G8484 FLU IMMUNIZE NO ADMIN: HCPCS | Performed by: INTERNAL MEDICINE

## 2021-10-18 NOTE — PROGRESS NOTES
Cardiology Consultation/Follow Up. [address]  1955  [id number]    CC: Patient is here for follow up Re: syncope    HPI  Debra Guerra is here for follow up Re: syncope  At last office visit we stopped hydralazine. Since then has felt well. He has not had a single episode of dizziness since then. No cp, sob, orthopnea, pnd, le edema, palpitations. Past Medical:  Past Medical History:   Diagnosis Date    [facility] [address]     Diabetes mellitus ([facility] [address]     History of back surgery     Measles     Osteoarthritis        Past Surgical:  Past Surgical History:   Procedure Laterality Date    BACK SURGERY      HERNIA REPAIR         Family History:  Family History   Problem Relation Age of Onset    High Blood Pressure Mother     Cataracts Mother     Diabetes Maternal Grandmother        Social History:  Social History     Tobacco Use    Smoking status: Former Smoker     Packs/day: 2.00     Years: 30.00     Pack years: 60.00     Types: Cigarettes     Start date:      Quit date: 2016     Years since quittin.3    Smokeless tobacco: Never Used   Vaping Use    Vaping Use: Never used   Substance Use Topics    Alcohol use: Never    Drug use: Never        REVIEW OF SYSTEMS:    · Constitutional: there has been no unanticipated weight loss. There's been No change in energy level, No change in activity level. · Eyes: No visual changes or diplopia. No scleral icterus. · ENT: No Headaches, hearing loss or vertigo. No mouth sores or sore throat. · Cardiovascular: AS HPI  · Respiratory: AS HPI  · Gastrointestinal: No abdominal pain, appetite loss, blood in stools. No change in bowel or bladder habits. · Genitourinary: No dysuria, trouble voiding, or hematuria. · Musculoskeletal:  No gait disturbance, No weakness or joint complaints. · Integumentary: No rash or pruritis.   · Neurological: No headache, diplopia, change in muscle strength, numbness or tingling. No change in gait, balance, coordination, mood, affect, memory, mentation, behavior. · Psychiatric: No new anxiety or depression. · Endocrine: No temperature intolerance. No excessive thirst, fluid intake, or urination. No tremor. · Hematologic/Lymphatic: No abnormal bruising or bleeding, blood clots or swollen lymph nodes. · Allergic/Immunologic: No nasal congestion or hives. Medications:  Current Outpatient Medications   Medication Sig Dispense Refill    atorvastatin (LIPITOR) 40 MG tablet TAKE 1 TABLET DAILY 90 tablet 3    clopidogrel (PLAVIX) 75 MG tablet TAKE 1 TABLET DAILY 90 tablet 3    lisinopril (PRINIVIL;ZESTRIL) 20 MG tablet TAKE 1 TABLET DAILY 90 tablet 3    metFORMIN (GLUCOPHAGE) 500 MG tablet TAKE 1 TABLET TWICE A  tablet 3    amLODIPine (NORVASC) 10 MG tablet TAKE 1 TABLET DAILY 90 tablet 3    donepezil (ARICEPT) 10 MG tablet TAKE 1 TABLET DAILY WITH SUPPER 90 tablet 1    cyclobenzaprine (FLEXERIL) 10 MG tablet TAKE 1 TABLET THREE TIMES A  tablet 3    aspirin 81 MG tablet Take 81 mg by mouth daily      vitamin B-12 (CYANOCOBALAMIN) 1000 MCG tablet Take 1,000 mcg by mouth daily      Cholecalciferol (VITAMIN D3) 5000 units TABS Take 5,000 Units by mouth daily      vitamin E 400 UNIT capsule Take 400 Units by mouth daily      magnesium oxide (MAG-OX) 400 MG tablet Take 400 mg by mouth daily      sildenafil (VIAGRA) 100 MG tablet Take 1 tablet by mouth daily as needed for Erectile Dysfunction (Patient not taking: Reported on 10/18/2021) 30 tablet 3     No current facility-administered medications for this visit. Physical Exam:   Vitals: /89   Pulse 98   Ht 5' 10\" (1.778 m)   Wt 226 lb 9.6 oz (102.8 kg)   SpO2 96%   BMI 32.51 kg/m²   General appearance: alert and cooperative with exam  HEENT: Head: Normocephalic, no lesions, without obvious abnormality.   Neck: no carotid bruit, no JVD  Lungs: clear to auscultation bilaterally  Heart: regular rate and rhythm, S1, S2 normal, no murmur, click, rub or gallop  Abdomen: soft, non-tender; bowel sounds normal; no masses,  no organomegaly  Extremities: extremities normal, atraumatic, no cyanosis or edema  Neurologic: Mental status: Alert, oriented, thought content appropriate    Labs:  Lab Results   Component Value Date    CHOL 185 07/13/2021    TRIG 421 (H) 07/13/2021    HDL 37 (L) 07/13/2021    LDLCHOLESTEROL      07/13/2021    LDLCALC 50 11/02/2017    VLDL NOT REPORTED 07/13/2021    CHOLHDLRATIO 5.0 (H) 07/13/2021       Lab Results   Component Value Date     07/13/2021    K 4.5 07/13/2021     07/13/2021    CO2 28 07/13/2021    BUN 15 07/13/2021    CREATININE 1.09 07/13/2021    GLUCOSE 160 (H) 02/01/2021    CALCIUM 9.3 07/13/2021    PROT 7.0 07/13/2021    LABALBU 4.7 07/13/2021    BILITOT 0.36 07/13/2021    ALKPHOS 80 07/13/2021    AST 17 07/13/2021    ALT 26 07/13/2021    LABGLOM >60 07/13/2021    GFRAA >60 07/13/2021     EKG:   Sinus  Rhythm   Low voltage in limb leads.    -Old inferior infarct.    -Anterolateral ST-elevation -repolarization variant. ECHO:  Results for orders placed or performed during the hospital encounter of 03/31/21   ECHO Complete 2D W Doppler W Color  Summary  Normal left ventricular diameter. Borderline left ventricular hypertrophy. Left ventricular systolic function is normal.  Left ventricular ejection fraction 65 %. No doppler evidence of diastolic dysfunction. Normal structure and function of valves. No pericardial effusion. Aortic root is mildly dilated at 3.9 cm. Carotid on 3/21:  Normal carotid ultrasound study. 48 hour holter 4/21:      Past Medical and Surgical History, Problem List, Allergies, Medications, Labs, Imaging, all reviewed extensively in EMR and with the patient.     Assessment:  - Syncope due to hypotension- resolved after keeping off hydralazine  - Reviewed Holter 4/21- low risk, rare PAC, PVC, Short PAT- 3 beats  - HTN  - DL- per pcp- TG higher in 400s  - CVA in 2016  - Preserved LVEF Echo 3/21  - Normal Carotid Doppler on 3/21  - DM- per pcp    Plan:  - keep off hydralazine  - continue other medications. The patient is to continue heart healthy diet, weight loss and exercise as tolerated. Patient's medications and side effects were discussed. Medication refills were provided if needed. Follow up appointment timing was discussed. All questions and concerns were addressed to patient's satisfaction. The patient is to follow up in 6 months or sooner if necessary. Thank you for allowing me to participate in the care of this patient, please do not hesitate to call if you have any questions. Kelly Montoya DO, [facility], [address], [address] [facility]  [facility]  [phone]

## 2021-11-16 ENCOUNTER — TELEPHONE (OUTPATIENT)
Dept: INTERNAL MEDICINE | Age: 66
End: 2021-11-16

## 2021-11-16 NOTE — TELEPHONE ENCOUNTER
Attempted to call patient but no answer and unable to leave vm, need to reschedule his appt with Dr Yaya Issa on 12/28 @3pm

## 2022-03-02 DIAGNOSIS — R41.3 MEMORY LOSS: ICD-10-CM

## 2022-03-02 RX ORDER — DONEPEZIL HYDROCHLORIDE 10 MG/1
TABLET, FILM COATED ORAL
Qty: 90 TABLET | Refills: 0 | Status: SHIPPED | OUTPATIENT
Start: 2022-03-02 | End: 2022-03-23 | Stop reason: SDUPTHER

## 2022-03-02 NOTE — TELEPHONE ENCOUNTER
Last Appt:  5/14/2021  Next Appt:   3/23/2022  Med verified in [facility] [address]

## 2022-03-23 ENCOUNTER — OFFICE VISIT (OUTPATIENT)
Dept: NEUROLOGY | Age: 67
End: 2022-03-23
Payer: MEDICARE

## 2022-03-23 VITALS
DIASTOLIC BLOOD PRESSURE: 82 MMHG | RESPIRATION RATE: 16 BRPM | BODY MASS INDEX: 33 KG/M2 | HEART RATE: 96 BPM | WEIGHT: 230 LBS | OXYGEN SATURATION: 96 % | SYSTOLIC BLOOD PRESSURE: 138 MMHG

## 2022-03-23 DIAGNOSIS — Z98.890 HISTORY OF BACK SURGERY: ICD-10-CM

## 2022-03-23 DIAGNOSIS — I63.89 INFARCTION OF TEMPORAL LOBE (HCC): ICD-10-CM

## 2022-03-23 DIAGNOSIS — Z87.898 H/O SYNCOPE: ICD-10-CM

## 2022-03-23 DIAGNOSIS — I63.00 CEREBROVASCULAR ACCIDENT (CVA) DUE TO THROMBOSIS OF PRECEREBRAL ARTERY (HCC): ICD-10-CM

## 2022-03-23 DIAGNOSIS — R41.3 MEMORY LOSS: ICD-10-CM

## 2022-03-23 DIAGNOSIS — F03.90 DEMENTIA WITHOUT BEHAVIORAL DISTURBANCE, UNSPECIFIED DEMENTIA TYPE: Primary | ICD-10-CM

## 2022-03-23 DIAGNOSIS — H53.461 RIGHT HOMONYMOUS HEMIANOPSIA: ICD-10-CM

## 2022-03-23 DIAGNOSIS — I10 ESSENTIAL HYPERTENSION: ICD-10-CM

## 2022-03-23 DIAGNOSIS — E11.9 TYPE 2 DIABETES MELLITUS WITHOUT COMPLICATION, WITHOUT LONG-TERM CURRENT USE OF INSULIN (HCC): ICD-10-CM

## 2022-03-23 DIAGNOSIS — I63.9 OCCIPITAL CEREBRAL INFARCTION (HCC): ICD-10-CM

## 2022-03-23 PROCEDURE — G8484 FLU IMMUNIZE NO ADMIN: HCPCS | Performed by: PSYCHIATRY & NEUROLOGY

## 2022-03-23 PROCEDURE — 1036F TOBACCO NON-USER: CPT | Performed by: PSYCHIATRY & NEUROLOGY

## 2022-03-23 PROCEDURE — G8427 DOCREV CUR MEDS BY ELIG CLIN: HCPCS | Performed by: PSYCHIATRY & NEUROLOGY

## 2022-03-23 PROCEDURE — 99213 OFFICE O/P EST LOW 20 MIN: CPT | Performed by: PSYCHIATRY & NEUROLOGY

## 2022-03-23 PROCEDURE — 99214 OFFICE O/P EST MOD 30 MIN: CPT | Performed by: PSYCHIATRY & NEUROLOGY

## 2022-03-23 PROCEDURE — 3046F HEMOGLOBIN A1C LEVEL >9.0%: CPT | Performed by: PSYCHIATRY & NEUROLOGY

## 2022-03-23 PROCEDURE — 2022F DILAT RTA XM EVC RTNOPTHY: CPT | Performed by: PSYCHIATRY & NEUROLOGY

## 2022-03-23 PROCEDURE — 3017F COLORECTAL CA SCREEN DOC REV: CPT | Performed by: PSYCHIATRY & NEUROLOGY

## 2022-03-23 PROCEDURE — G8417 CALC BMI ABV UP PARAM F/U: HCPCS | Performed by: PSYCHIATRY & NEUROLOGY

## 2022-03-23 RX ORDER — DONEPEZIL HYDROCHLORIDE 10 MG/1
TABLET, FILM COATED ORAL
Qty: 90 TABLET | Refills: 1 | Status: SHIPPED | OUTPATIENT
Start: 2022-03-23 | End: 2022-08-22 | Stop reason: SDUPTHER

## 2022-03-23 ASSESSMENT — ENCOUNTER SYMPTOMS
EYE ITCHING: 0
EYE REDNESS: 0
ABDOMINAL DISTENTION: 0
EYE PAIN: 0
FACIAL SWELLING: 0
VISUAL CHANGE: 1
BOWEL INCONTINENCE: 0
SINUS PRESSURE: 0
PHOTOPHOBIA: 0
NAUSEA: 0
SHORTNESS OF BREATH: 0
WHEEZING: 0
TROUBLE SWALLOWING: 0
ABDOMINAL PAIN: 0
VOMITING: 0
CONSTIPATION: 0
VOICE CHANGE: 0
DIARRHEA: 0
EYE DISCHARGE: 0
COUGH: 0
CHOKING: 0
BACK PAIN: 1
COLOR CHANGE: 0
APNEA: 0
SORE THROAT: 0
BLOOD IN STOOL: 0
CHEST TIGHTNESS: 0
SWOLLEN GLANDS: 0
CHANGE IN BOWEL HABIT: 0

## 2022-03-23 NOTE — PATIENT INSTRUCTIONS
* FALL   PRECAUTIONS. *   ADEQUATE   FLUID  INTAKE   AND  ELECTROLYTE  BALANCE             * KEEP  DAIRY  OF   THE  NEUROLOGICAL  SYMPTOMS          *  TO  MAINTAIN  REGULAR  SLEEP  WAKE  CYCLES. *   TO  HAVE  ADEQUATE  REST  AND   SLEEP    HOURS.          *    AVOID  USAGE OF   TOBACCO,  EXCESSIVE  ALCOHOL                AND   ILLEGAL   SUBSTANCES,  IF  ANY          *  Maintain   Healthy  Life Style    With   Periodic  Monitoring  Of         Any  Medical  Conditions  Including   Elevated  Blood  Pressure,  Lipid  Profile,       Blood  Sugar levels  And   Heart  Disease. *   Period   Screening  For  Cancers  Involving  Breast,  Colon,         Lungs  And  Other  Organs  As  Applicable,           In consultation   With  Your  Primary Care Providers. *  If   There is  Any  Significant  Worsening   Of  Current  Symptoms  And             Or  If    Any additional  New  Neurological  Symptoms  and          Significant  Concerns   Should  Call  911 or  Go  To  Emergency  Department            For  Further  Immediate  Evaluation.

## 2022-03-23 NOTE — PROGRESS NOTES
[facility]  Neurology  [address] [address]  ZYF:[phone]   Fax: [phone]        SUBJECTIVE:     PATIENT ID:  Bonifacio Butler is a   LEFT     HANDED 61 y.o. male. Cerebrovascular Accident  This is a chronic problem. Episode onset: SEPT. 2016   The problem has been unchanged. Associated symptoms include numbness and a visual change. Pertinent negatives include no abdominal pain, anorexia, arthralgias, change in bowel habit, chest pain, chills, congestion, coughing, diaphoresis, fatigue, fever, headaches, joint swelling, myalgias, nausea, neck pain, rash, sore throat, swollen glands, urinary symptoms, vertigo, vomiting or weakness. Nothing aggravates the symptoms. Treatments tried: ASA   AND PLAVIX   The treatment provided moderate relief. Neurologic Problem  The patient's primary symptoms include clumsiness, focal sensory loss, a loss of balance, memory loss and a visual change. The patient's pertinent negatives include no altered mental status, focal weakness, near-syncope, slurred speech, syncope or weakness. This is a chronic problem. Episode onset: MORE  THAN   2-3  YEARS. The neurological problem developed insidiously. There was lower extremity, left-sided and right-sided focality noted. Associated symptoms include back pain. Pertinent negatives include no abdominal pain, auditory change, aura, bladder incontinence, bowel incontinence, chest pain, confusion, diaphoresis, dizziness, fatigue, fever, headaches, light-headedness, nausea, neck pain, palpitations, shortness of breath, vertigo or vomiting. Past treatments include medication, bed rest and sleep. The treatment provided mild relief. His past medical history is significant for a CVA and dementia. There is no history of a bleeding disorder, a clotting disorder, head trauma, liver disease, mood changes or seizures.            History obtained from  The patient     And   HIS  WIFE       and other  available   medical  records   were  Also  reviewed. The  Duration,  Quality,  Severity,  Location,  Timing,  Context,  Modifying  Factors   Of   The   Chief   Complaint       And  Present  Illness  Was   Reviewed   In   Chronological   [place]. PATIENT'S  MAIN  CONCERNS INCLUDE :                     1)     H/O    OLD    STROKE  IN     2016           WITH                                       HOSPITALIZATION  IN  [place]                             2)    PATIENT  WAS    REPORTED  TO    UNREPONSIVE                                       DUE  TO  CARDIAC    PROBLEMS                                 3 )    MRI  BRAIN   IN    SEPT. 2016     SHOWED   :                               A)   BILATERAL  TEMPORAL  INFARCTS                                 B)   OLD  LEFT  OCCIPITAL INFARCT                               4)      H/O   POST  STROKE     MEMORY  AND  COGNITIVE  PROBLEMS                                              SINCE     2016                                    -      WAS      ON  ARICEPT    5  MG  DAILY                             5)      RESIDUAL   RIGHT     VISUAL   FIELD  DEFECTS                                              DUE  TO  PREVIOUS  STROKE                               6)     CURRENTLY  ON   ASA  AND PLAVIX                                         PROPHYLACTIC ALLY           AND                                    TOLERATING  THE  SAME.                                7)     MULTIPLE  CO  MORBID  MEDICAL  CONDITIONS                                 BEING  FOLLOWED  BY  HIS  PCP                                8)     DIABETIC  PERIPHERAL  POLYNEUROPATHY                                               MOSTLY   BOTH  LOWER  EXTREMITIES                                 9)   H/O   CHRONIC  LUMBAR  PAIN                            PREVIOUS  H/O   LUMBAR  DISC   SURGERIES                                    10)     MILD    BALANCE PROBLEMS -     STABLE                              11)    H/O  CHRONIC    MILD  ANXIETY. PATIENT  DENIES  ANY  DEPRESSIVE  SYMPTOMS                              12)     PREVIOUS  H/O   SMOKING. PATIENT  QUIT  SMOKING  SINCE     SEPT. 2016                                                                 13)        HAD    NEURO  DIAGNOSTIC  EVALUATIONS  IN  July 2019                                        A)   CT   HEAD  SHOWED   NO  ACUTE  PATHOLOGY                                           REMOTE      STROKES   -   REMAIN    UN  CHANGED                                        B)    CAROTID  DOPPLER,  EEG  ,  LABS  SHOWED                                               NO  SIGNIFICANT    ABNORMALITIES                                                             14)          AS   DISCUSSED    WITH  PATIENT   AND  HIS  WIFE                                PATIENT     STARTED    ON       ARICEPT    10  MG  PO   LAMB   WITH  SUPPER                                       IN   SEPT. 2019                                     PATIENT  TOLERATING  THE  SAME  AND  GETTING  BENEFIT. EXPECTATIONS,   GOALS   AND  SIDE  EFFECTS  MEDICATION    WERE                                 REVIEWED     AND   DISCUSSED    IN    DETAIL.                               15)      H / O     SYNCOPE     IN     FEB. 2021                              PATIENT   WOKE    UP    ON     FLOOR      AS   NOTICED  BY   HIS  WIFE                                   D /  D  INCLUDE                                              CARDIAC    ARRHYTHMIA,    VASOVAGAL                                                 SEIZURES,   MEDICATION    EFFECTS                                                     AND   OTHER    ETIOLOGIES                             16)     PATIENT  HAD     ER   VISIT      AND    EVALUATIONS                                  CT HEAD  ON    02/01/2021       SHOWED                                   A)      NO   ACUTE  PATHOLOGY                                    B)    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                                               AND  RIGHT   TEMPORAL  LOBES                                      C)    CHRONIC  CEREBRAL ISCHEMIA                         17)      CAROTID  DOPPLER  AND   EEG  IN  April 2021                                  SHOWED    NO    SIGNIFICANT  ABNORMALITIES                                       18)     PATIENT   HAD      CARDIOLOGY     EVALUATIONS                             PATIENT    SYMPTOMS      IMPROVED    AFTER    STOPPING                                 HYDRALAZINE                            19)                NO   H/O    RECURRENCE OF  TIA  /   STROKE                                          NO   RECURRENCE    OF     SYNCOPE.                                             MEMORY   PROBLEMS     ARE   STABLE                                                                       PATIENT  AND  HIS  WIFE   DENIED  ANY    NEW   NEUROLOGICAL    CONCERNS,  .                                    20)     VARIOUS  RISK   FACTORS   WERE  REVIEWED   AND   DISCUSSED. PATIENT   HAS  MULTIPLE   MEDICAL, NEUROLOGICAL      PROBLEMS                     PATIENT'S   MANAGEMENT  IS  CHALLENGING.                                     PRECIPITATING  FACTORS: including  fever/infection, exertion/relaxation, position change,                        stress, weather change,                        medications/alcohol, time of day/darkness/light  Are  absent                                                                MODIFYING  FACTORS:  fever/infection, exertion/relaxation, position change, stress, weather change,                        medications/alcohol, time of day/darkness/light  Are  absent             Patient   Indicates   The  Presence   And  The  Absence  Of  The  Following Associated  And          Additional  Neurological    Symptoms:                                Balance  And coordination   problems  present           Gait problems     absent            Headaches      absent              Migraines           absent           Memory problemspresent              Confusion        absent            Paresthesia   numbness          present           Seizures  And  Starring  Episodes           absent           Syncope,  Near  syncopal episodes         absent           Speech   problems           absent             Swallowing   Problems      absent            Dizziness,  Light headedness           absent              Vertigo        absent             Generalized   Weakness    absent              focal  Weakness     absent             Tremors         absent              Sleep  Problems     absent             History  Of   Recent  Head  Injury     absent             History  Of   Recent  TIA     absent             History  Of   Recent    Stroke     absent             Neck  Pain   and   Neck muscle  Spasms  absent               Radiating  down   And   Weakness           absent            Lower back   Pain  And     Spasms  present              Radiating    Down   And   Weakness          present                H/O    FALLS        absent               History  Of   Visual  Symptoms   PRESENT                    Associated   Diplopia       absent                                               Also   Additional   Symptoms   Present    As  Documented    In   The   detailed                  Review  Of  Systems   And    Please   Refer   To    Them for   Additional    Information. Any components  That are either  Unobtainable  Or  Limited  In   HPI, ROS  And/or PFSH   Are                   Due   ToPatient's  Medical  Problems,  Clinical  Condition   and/or lack of                                other    Alternate   resources.           RECORDS   REVIEWED:    historical medical records       INFORMATION   REVIEWED:     MEDICAL   HISTORY,SURGICAL   HISTORY,     MEDICATIONS   LIST,   ALLERGIES AND  DRUG  INTOLERANCES,       FAMILY   HISTORY,  SOCIAL  HISTORY,      PROBLEM  LIST   FOR  PATIENT  CARE   COORDINATION      Past Medical History:   Diagnosis Date    [facility]     Diabetes mellitus [facility]     History of back surgery     Measles     Osteoarthritis          Past Surgical History:   Procedure Laterality Date    BACK SURGERY      HERNIA REPAIR           Current Outpatient Medications   Medication Sig Dispense Refill    donepezil (ARICEPT) 10 MG tablet TAKE 1 TABLET DAILY WITH SUPPER 90 tablet 1    sildenafil (VIAGRA) 100 MG tablet Take 1 tablet by mouth daily as needed for Erectile Dysfunction 30 tablet 3    atorvastatin (LIPITOR) 40 MG tablet TAKE 1 TABLET DAILY 90 tablet 3    clopidogrel (PLAVIX) 75 MG tablet TAKE 1 TABLET DAILY 90 tablet 3    lisinopril (PRINIVIL;ZESTRIL) 20 MG tablet TAKE 1 TABLET DAILY 90 tablet 3    metFORMIN (GLUCOPHAGE) 500 MG tablet TAKE 1 TABLET TWICE A  tablet 3    amLODIPine (NORVASC) 10 MG tablet TAKE 1 TABLET DAILY 90 tablet 3    cyclobenzaprine (FLEXERIL) 10 MG tablet TAKE 1 TABLET THREE TIMES A  tablet 3    aspirin 81 MG tablet Take 81 mg by mouth daily      vitamin B-12 (CYANOCOBALAMIN) 1000 MCG tablet Take 1,000 mcg by mouth daily      Cholecalciferol (VITAMIN D3) 5000 units TABS Take 5,000 Units by mouth daily      vitamin E 400 UNIT capsule Take 400 Units by mouth daily      magnesium oxide (MAG-OX) 400 MG tablet Take 400 mg by mouth daily       No current facility-administered medications for this visit.          No Known Allergies      Family History   Problem Relation Age of Onset    High Blood Pressure Mother     Cataracts Mother     Diabetes Maternal Grandmother          Social History     Socioeconomic History    Marital status:      Spouse name: Not on file    Number of children: Not on file    Years of education: Not on file    Highest education level: Not on file   Occupational History    Not on file   Tobacco Use    Smoking status: Former Smoker     Packs/day: 2.00     Years: 30.00     Pack years: 60.00     Types: Cigarettes     Start date:      Quit date: 2016     Years since quittin.8    Smokeless tobacco: Never Used   Vaping Use    Vaping Use: Never used   Substance and Sexual Activity    Alcohol use: Not Currently    Drug use: Never    Sexual activity: Not on file   Other Topics Concern    Not on file   Social History Narrative    Not on file     Social Determinants of Health     Financial Resource Strain: Low Risk     Difficulty of Paying Living Expenses: Not hard at all   Food Insecurity: No Food Insecurity    Worried About [address] in the Last Year: Never true    [address] in the Last Year: Never true   Transportation Needs:     Lack of Transportation (Medical): Not on file    Lack of Transportation (Non-Medical):  Not on file   Physical Activity:     Days of Exercise per Week: Not on file    Minutes of Exercise per Session: Not on file   Stress:     Feeling of Stress : Not on file   Social Connections:     Frequency of Communication with Friends and Family: Not on file    Frequency of Social Gatherings with Friends and Family: Not on file    Attends [employer] Services: Not on file    Active Member of [address] [place] [address] or Organizations: Not on file    Attends Club or Organization Meetings: Not on file    Marital Status: Not on file   Intimate Partner Violence:     Fear of Current or Ex-Partner: Not on file    Emotionally Abused: Not on file    Physically Abused: Not on file    Sexually Abused: Not on file   Housing Stability:     Unable to Pay for Housing in the Last Year: Not on file    Number of [place] in the Last Year: Not on file    Unstable Housing in the Last Year: Not on file       Vitals:    22 1551   BP: 138/82   Pulse: 96   Resp: 16   SpO2: 96%         Wt Readings from Last 3 Encounters:   03/23/22 230 lb (104.3 kg)   10/18/21 226 lb 9.6 oz (102.8 kg)   10/04/21 227 lb (103 kg)         BP Readings from Last 3 Encounters:   03/23/22 138/82   10/18/21 135/89   10/04/21 134/82       Chemistries  Lab Results   Component Value Date     07/13/2021    K 4.5 07/13/2021     07/13/2021    CO2 28 07/13/2021    BUN 15 07/13/2021    CREATININE 1.09 07/13/2021    CALCIUM 9.3 07/13/2021    PROT 7.0 07/13/2021    LABALBU 4.7 07/13/2021    BILITOT 0.36 07/13/2021    ALKPHOS 80 07/13/2021    AST 17 07/13/2021    ALT 26 07/13/2021     Lab Results   Component Value Date    ALKPHOS 80 07/13/2021    ALT 26 07/13/2021    AST 17 07/13/2021    PROT 7.0 07/13/2021    BILITOT 0.36 07/13/2021    LABALBU 4.7 07/13/2021     Lab Results   Component Value Date    BUN 15 07/13/2021    CREATININE 1.09 07/13/2021     Lab Results   Component Value Date    CALCIUM 9.3 07/13/2021    MG 2.0 07/13/2021     Lab Results   Component Value Date    AST 17 07/13/2021    ALT 26 07/13/2021     Lab Results   Component Value Date    [id number] 07/13/2021           Review of Systems   Constitutional: Negative for appetite change, chills, diaphoresis, fatigue, fever and unexpected weight change. HENT: Negative for congestion, dental problem, drooling, ear discharge, ear pain, facial swelling, hearing loss, mouth sores, nosebleeds, postnasal drip, sinus pressure, sore throat, tinnitus, trouble swallowing and voice change. Eyes: Positive for visual disturbance. Negative for photophobia, pain, discharge, redness and itching. Respiratory: Negative for apnea, cough, choking, chest tightness, shortness of breath and wheezing. Cardiovascular: Negative for chest pain, palpitations, leg swelling and near-syncope.    Gastrointestinal: Negative for abdominal distention, abdominal pain, anorexia, blood in stool, bowel incontinence, change in bowel habit, constipation, diarrhea, nausea and vomiting. Endocrine: Negative for cold intolerance, heat intolerance, polydipsia, polyphagia and polyuria. Genitourinary: Negative for bladder incontinence. Musculoskeletal: Positive for back pain. Negative for arthralgias, gait problem, joint swelling, myalgias, neck pain and neck stiffness. Skin: Negative for color change, pallor, rash and wound. Allergic/Immunologic: Negative for environmental allergies, food allergies and immunocompromised state. Neurological: Positive for numbness and loss of balance. Negative for dizziness, vertigo, tremors, focal weakness, seizures, syncope, facial asymmetry, speech difficulty, weakness, light-headedness and headaches. Hematological: Negative for adenopathy. Does not bruise/bleed easily. Psychiatric/Behavioral: Positive for decreased concentration and memory loss. Negative for agitation, behavioral problems, confusion, dysphoric mood, hallucinations, self-injury, sleep disturbance and suicidal ideas. The patient is nervous/anxious. The patient is not hyperactive. OBJECTIVE:    Physical Exam  Constitutional:       Appearance: He is well-developed. HENT:      Head: Normocephalic and atraumatic. No raccoon eyes or Adame's sign. Right Ear: External ear normal.      Left Ear: External ear normal.      Nose: Nose normal.   Eyes:      Conjunctiva/sclera: Conjunctivae normal.   Neck:      Thyroid: No thyroid mass or thyromegaly. Vascular: No carotid bruit. Trachea: No tracheal deviation. Meningeal: Brudzinski's sign and Kernig's sign absent. Cardiovascular:      Rate and Rhythm: Normal rate and regular rhythm. Pulmonary:      Effort: Pulmonary effort is normal.   Musculoskeletal:         General: No tenderness. Cervical back: Normal range of motion and neck supple. No rigidity. No muscular tenderness. Normal range of motion. Skin:     General: Skin is warm.       Coloration: Skin is Strength. No facial  Weakness. 8 CN :  Hearing  Appears within normal limits          9, 10 CN: Normal   spontaneous, reflex   palate   movements         11 CN:   Normal  Shoulder  shrug and  strength         12 CN :   Normal  Tongue movements and  Tongue  In midline                        No tongue   Fasciculations or atrophy       C) MOTOR  EXAM:                 Strength  In upper  And  Lower   extremities   within   normal limits               No  Drift. No  Atrophy               Rapid   alternating  And  repetitions  Movements  within   normal limits               Muscle  Tone  In upper  And  Lower  Extremities  normal                No rigidity. No  Spasticity. Bradykinesia   absent               No  Asterixis. Sustention  Tremor , Resting   Tremor   absent                    No   other  Abnormal  Movements noted           D) SENSORY :               Light   touch, pinprick,   position  And  Vibration  DECREASED                             BELOW  BOTH  KNEE  LEVELS        E) REFLEXES:                   Deep  Tendon  Reflexes     DECREASED                    No  pathological  Reflexes  Bilaterally.                                   F) COORDINATION  AND  GAIT :                                Station and  Gait    SLOW                               Romberg 's test   POSITIVE                            Ataxia negative        ASSESSMENT:        Patient Active Problem List   Diagnosis    Low back pain    Gastroesophageal reflux disease without esophagitis    Memory loss    Hyperlipidemia    Essential hypertension    Type 2 diabetes mellitus without complication, without long-term current use of insulin (HCC)    Right homonymous hemianopsia    Hypertensive retinopathy of both eyes    Combined forms of age-related cataract of both eyes    History of back surgery    Diabetes mellitus ([address]    Occipital cerebral infarction ([address]    Infarction of temporal lobe ([address]  Dementia without behavioral disturbance [facility]    Cerebrovascular accident (CVA) due to thrombosis of precerebral artery ([address]    Syncope    H/O syncope         ULTRASOUND EVALUATION OF THE CAROTID ARTERIES       3/31/2021       COMPARISON:   July 12, 2019.       HISTORY:   ORDERING SYSTEM PROVIDED HISTORY: Dementia without behavioral disturbance,   unspecified dementia type ([facility]       FINDINGS:   Gray scale and color and spectral doppler ultrasound examination of the   carotid and vertebral arteries was performed.       The bilateral common carotid, internal carotid, and external carotid arteries   are patent. There is no appreciable carotid plaque formation or intimal   medial thickening.       The arterial waveforms and peak systolic velocity measurements within the   common carotid, internal carotid, and external carotid arteries are within   normal limits bilaterally.       The internal carotid to common carotid ratios are within normal limits   bilaterally, measuring 1.30 on the right and 0.50 on the left.       The vertebral arteries are patent and demonstrate appropriate antegrade flow   direction.               Impression   Normal carotid ultrasound study.                   CT OF THE HEAD WITHOUT CONTRAST  2/1/2021 1:24 pm       TECHNIQUE:   CT of the head was performed without the administration of intravenous   contrast. Dose modulation, iterative reconstruction, and/or weight based   adjustment of the mA/kV was utilized to reduce the radiation dose to as low   as reasonably achievable.       COMPARISON:   Head CT of 07/12/2019       HISTORY:   ORDERING SYSTEM PROVIDED HISTORY: syncope   TECHNOLOGIST PROVIDED HISTORY:       syncope   Reason for Exam: Syncope today with fall,abrasion to posterior skull. History   of old  stroke and memory loss. Acuity: Acute   Type of Exam: Initial       FINDINGS:   There is no acute  intracranial hemorrhage or intracranial mass lesion.  No   mass effect, midline shift or extra-axial collection is noted.       There are moderate nonspecific foci of periventricular and subcortical   cerebral white matter hypodensity, most likely representing chronic   microangiopathic disease in this age group.  There are chronic   encephalomalacia changes within the left occipital lobe and posterior aspect   of right temporal lobe.  The brain parenchyma is otherwise normal. The   cerebellar tonsils are in normal position.       The ventricles, sulci, and cisterns are mildly prominent suggestive of mild   generalized volume loss.       The globes and orbits are within normal limits.  Calcified sebaceous cyst   within the subcutaneous tissues of central occipital region.  The visualized   extracranial structures including paranasal sinuses and mastoid air cells are   unremarkable. No fracture is identified.           Impression       Stable study.  No significant change since prior head CT of 2019.       Chronic encephalomalacia changes within the left occipital lobe and posterior   aspect of right temporal lobe.       No evidence for acute intracranial hemorrhage or intracranial mass lesion.       Moderate chronic microangiopathic ischemic disease.       Mild generalized volume loss.            CT OF THE HEAD WITHOUT CONTRAST  7/12/2019 4:17 pm       TECHNIQUE:   CT of the head was performed without the administration of intravenous   contrast. Dose modulation, iterative reconstruction, and/or weight based   adjustment of the mA/kV was utilized to reduce the radiation dose to as low   as reasonably achievable.       COMPARISON:   None.       HISTORY:   ORDERING SYSTEM PROVIDED HISTORY: Right homonymous hemianopsia   TECHNOLOGIST PROVIDED HISTORY:   history of CVA   Reason for Exam: Balance and memory loss with vision changes.  History of CVA   1 year ago   Acuity: Chronic   Type of Exam: Initial       FINDINGS:   BRAIN/VENTRICLES: There is no acute intracranial hemorrhage, mass effect or midline shift.  No abnormal extra-axial fluid collection.  The gray-white   differentiation is maintained without evidence of an acute infarct.  There is   no evidence of hydrocephalus. Old ischemic strokes with resultant   encephalomalacia in the left occipital lobe and posterior aspect of right   temporal lobe.       ORBITS: The visualized portion of the orbits demonstrate no acute abnormality.       SINUSES: The visualized paranasal sinuses and mastoid air cells demonstrate   no acute abnormality.       SOFT TISSUES/SKULL:  No acute abnormality of the visualized skull or soft   tissues.  Small sebaceous cyst with calcifications in the subcutaneous fat of   the posterior parietal scalp along the midline.           Impression   No acute intracranial abnormality.       Old ischemic strokes with resultant encephalomalacia in the left occipital   lobe and posterior aspect of right temporal lobe.             History:  Stroke    Exam/Technique:  Multiplanar multisequence images were obtained without the use of contrast.    Comparison:  Cerebral MRI from September 29, 2016    Isadore Denver is no evidence of recent infarcts on diffusion-weighted images. There is an old left occipital cortical infarct medially, and  smaller areas of old infarction in the mesial temporal cortex bilaterally. These were recent time of the previous MRI There are also scattered punctate areas of increased T2 signal in the   deep supratentorial white matter that are consistent with chronic small vessel ischemic change. This also one 3 mm diameter fluid intensity area in the left cerebellar white matter that is consistent with an old lacunar infarct. Ventricles and CSF spaces are bilaterally symmetric and within normal limits. Gray-white matter differentiation is normal throughout. Incidental note is made of an apparent sedation cyst along the occipital portion of the scalp.  There is some fluid content in mastoid air cells bilaterally. Otherwise, no gross abnormalities are displayed in the bones or soft tissues of the skull base. IMPRESSION:  Old left occipital and bilateral temporal infarcts, and additional chronic abnormalities. No evidence of recent infarcts or other additional intracranial abnormalities. Finalized by Danish Gonzalez MD on 12/20/2016 2:23 PM      ULTRASOUND EVALUATION OF THE CAROTID ARTERIES       7/12/2019       COMPARISON:   None.       HISTORY:   ORDERING SYSTEM PROVIDED HISTORY: Right homonymous hemianopsia   TECHNOLOGIST PROVIDED HISTORY:   history of CVA   Reason for Exam: right homonymous hemianopsia   Acuity: Chronic   Type of Exam: Subsequent/Follow-up       FINDINGS:       RIGHT:       The right common carotid artery demonstrates peak systolic velocities of 65,   61 cm/sec in the proximal and distal segments respectively.       The right internal carotid artery demonstrates the systolic velocities of 74,   60, 38 cm/sec in the proximal, mid and distal segments respectively.       The external carotid artery is patent.  The vertebral artery demonstrates   normal antegrade flow.       No evidence of focal atherosclerotic plaque.       ICA/CCA ratio of 1.1.           LEFT:       The left common carotid artery demonstrates peak systolic velocities of 67,   70 cm/sec in the proximal and distal segments respectively.       The left internal carotid artery demonstrates the systolic velocities of 43,   50, 50 cm/sec in the proximal, mid and distal segments respectively.       The external carotid artery is patent.  The vertebral artery demonstrates   normal antegrade flow.       A focus of mild smooth plaque is noted at the origin of the internal carotid   artery.  ICA/CCA ratio of 0.7.           Impression   The right internal carotid artery demonstrates 0-50% stenosis.       The left internal carotid artery demonstrates 0-50% stenosis.       Bilateral vertebral arteries are patent with flow in the normal direction.             VISITING DIAGNOSIS:        ICD-10-CM    1. Dementia without behavioral disturbance, unspecified dementia type ([facility] [address]  F03.90    2. Cerebrovascular accident (CVA) due to thrombosis of precerebral artery ([facility] [address]  I63.00    3. History of back surgery  Z98.890    4. Right homonymous hemianopsia  H53.461    5. Infarction of temporal lobe (HCC)  I63.89    6. Occipital cerebral infarction (HCC)  I63.9    7. H/O syncope  Z87.898    8. Type 2 diabetes mellitus without complication, without long-term current use of insulin (HCC)  E11.9    9. Memory loss  R41.3 donepezil (ARICEPT) 10 MG tablet   10. Essential hypertension  I10               CONCERNS   &   INCREASED   RISK   FOR         * TIA,  CEREBRO  VASCULAR  ISCHEMIA,STROKE      *   COGNITIVE  &   MEMORY PROBLEMS  AND  DEMENTIAS         *   PERIPHERAL  NEUROPATHY,        *   BALANCE PROBLMES                 VARIOUS  RISK   FACTORS   WERE  REVIEWED   AND   DISCUSSED. *  PATIENT   HAS  MULTIPLE   MEDICAL, NEUROLOGICAL      PROBLEMS         PATIENT'S   MANAGEMENT  IS  CHALLENGING. PLAN:                         Ashley Clines  Of  The  Diagnoses,  The  Management & Treatment  Options            AND    Care  plan  Were          Reviewed and   Discussed   With  patient. * Goals  And  Expectations  Of  The  Therapy  Discussed   And  Reviewed. *   Benefits   And   Side  Effect  Profile  Of  Medication/s   Were   Discussed                * Need   For  Further   Follow up For  The  Various  Problems Were  discussed. * Results  Of  The  Previous  Diagnostic tests were reviewed and  discussed                 Medical  Decision  Making  Was  Made  Based on the   Complexity  Of  Above  Mentioned  Diagnoses,    Data reviewed             And    Risk  Of  Significant   Co morbidities and   complicating   Factors.                  Medical  Decision  Was   High    Complexity   Due   To  The  Patient's  Multiple  Symptoms, Advancing   Disease,  Complex  Treatment  Regimen,  Multiple medications           and   Risk  Of   Side  Effects,  Difficulty  In  Medication  Management  And  Diagnostic  Challenges       In  View  Of  The  Associated   Co  Morbid  Conditions   And  Problems. * FALL   PRECAUTIONS. THESE  REVIEWED   AND  DISCUSSED      *   ABSOLUTELY   NO  DRIVING        *   BE  CAREFUL  WITH  ACTIVITIES            *   AVOID   BACK  STRAINING  ACTIVITIES          *   ADEQUATE   FLUIDINTAKE   AND  ELECTROLYTE  BALANCE         * KEEP  DAIRY  OF   THE  NEUROLOGICAL  SYMPTOMS        RECORDING THE    DURATION  AND  FREQUENCY. *  AVOID    CONDITIONS  AND  FACTORS   THAT  MAKE                  NEUROLOGICAL  SYMPTOMS  WORSE.           *TO  MAINTAIN  REGULAR  SLEEP  WAKE  CYCLES. *   TO  HAVE  ADEQUATE  REST  AND   SLEEP    HOURS.          *    AVOID  ANY USAGE OF    TOBACCO,              EXCESSIVE  ALCOHOL  AND   ILLEGAL   SUBSTANCES          *  CONTINUE   MEDICATIONS    PRESCRIBED   BY NEUROLOGIST    AS    RECOMMENDED       *   Compliance   With  Medications   And  Instructions          * CURRENTLY    TOLERATING  THE  PRESCRIBED   MEDICATIONS. WITHOUT  ANY  SIGNIFICANT  SIDE  EFFECTS   &  GETTING BENEFIT. *    Antiplatelet  therapy    As   Recommended  Was   Discussed          *    Prophylactic  Use   Of     Vitamin   B   Complex,  Folic  Acid,    Vitamin  B12    Multivitamin,       Calcium  With  magnesium  And  Vit D    Supplementations   Over  The  Counter  Discussed               *FOOT  CARE, DAILY  INSPECTION  OF  FEET   AND         PERIODIC  PODIATRY EVALUATIONS .             *  PATIENT  IS  ALSO   COUNSELED   TO  KEEP    ACTIVITIES:         A)   SIMPLE      B)  ORGANIZED      C)  WRITEDOWN                   *  EVALUATIONS  AND  FOLLOW UP:                                      *CARDIOLOGY              *   OPTHALMOLOGY                                           * CURRENTLY  ON   ASA  AND PLAVIX                                         PROPHYLACTIC ALLY           AND                                    TOLERATING  THE  SAME. *    CAROTID  DOPPLER  AND   EEG  IN  April 2021                                  SHOWED    NO    SIGNIFICANT  ABNORMALITIES                 *       NO   H/O    RECURRENCE OF  TIA  /   STROKE                                          NO   RECURRENCE    OF     SYNCOPE.                                             MEMORY   PROBLEMS     ARE   STABLE                                                             PATIENT  AND  HIS  WIFE   DENIED  ANY    NEW   NEUROLOGICAL    CONCERNS,  . VARIOUS  RISK   FACTORS   WERE  REVIEWED   AND   DISCUSSED. Orders Placed This Encounter   Medications    donepezil (ARICEPT) 10 MG tablet     Sig: TAKE 1 TABLET DAILY WITH SUPPER     Dispense:  90 tablet     Refill:  1               *PATIENT   TO  FOLLOW  UP  WITH   PRIMARY  CARE         OTHER  CONSULTANTS  AS  BEFORE. *  Maintain   Healthy  Life Style    With   Periodic  Monitoring  Of      Any  Medical  Conditions  Including   Elevated  Blood  Pressure,  Lipid  Profile,     Blood  Sugar levels  AndHeart  Disease. *   Period   Screening  For  Cancers  Involving  Breast,  Colon,    lungs  And  Other  Organs  As  Applicable,  In consultation   With  Your  Primary Care Providers. *Second  Neurological  Opinion  And  Evaluations  In  [facility]  Setting  If  Patient  Is  Interested. * Please   Contact   Neurology  Clinic   Early   If   Are  Any  New  Neurological   And  Any neurological  Concerns.                   *  If  The  Patient remains  Neurologically  Stable   Return   To  [facility] Neurology Department   IN      3 - 6           MONTHS  TIME   FOR  FURTHER              FOLLOW UP.                       *   The Neurological   Findings,  Possible  Diagnosis,  Differential diagnoses                    And  Options  For    Further   Investigations                   And  management   Are  Discussed  Comprehensively. Medications   And  Prescription   Risks  And  Side effects  Are   Also  Discussed. *  If   There is  Any  Significant  Worsening   Of  Current  Symptoms  And  Or                  If patient  Develops   Any additional  New  NeurologicalSymptoms                  Or  Significant  Concerns   Should  Call  911 or  Go  To  Emergency  Department  For  Further  Immediate  Evaluation. The   Above  Were  Reviewed  With  Patient     AND  HIS  WIFE      and                       questions  Answered  In  Detail. More   Than  50% of face  To face Time   Was  Spent  On  Counseling                    And   Coordination  Of  Care   Of   Patient's  multiple   Neurological  Problems                         And   Comorbid  Medical   Conditions. Electronically signed by Jhonny Nugent MD.,  [facility][1]      Board Certified in  Neurology &  In  [address] [employer] and Neurology ([employer])      DISCLAIMER:   Although every effort was made to ensure the accuracy of this  electronictranscription, some errors in transcription may have occurred. GENERAL PATIENT INSTRUCTIONS:      A Healthy Lifestyle: Care Instructions   Your Care Instructions   A healthy lifestyle can help you feel good, stay at ahealthy weight, and have plenty of energy for both work and play. A healthy lifestyle is something you can share with your whole family.  A healthy lifestyle also can lower your risk for serious health problems, such ashigh blood pressure, heart disease, and diabetes.  You can follow a few steps listed below to improve your health and the health of your family.    Follow-up careis a key part of your treatment and safety. Be sure to make and go to all appointments, and call your doctor if you are having problems. Its also a good idea to know your test results and keep a list of the medicines you take.  How can you care for yourself at home?  Do not eat too much sugar, fat, or fast foods. You can still have dessert and treats nowand then. The goal is moderation.  Start small to improve your eating habits. Pay attention to portion sizes, drink less juice and soda pop, and eat more fruits and vegetables.  Eat a healthy amount of food. A 3-ounce serving of meat, for example, is about the size of a deck of cards. Fill the rest of your plate with vegetables and whole grains.  Limit theamount of soda and sports drinks you have every day. Drink more water when you are thirsty.  Eat at least 5 servings of fruits and vegetables every day. It may seem like a lot, but it is not hard to reach this goal. Aserving or helping is 1 piece of fruit, 1 cup of vegetables, or 2 cups of leafy, raw vegetables. Have an apple or some carrot sticks as an afternoon snack instead of a candy bar. Try to have fruits and/or vegetables at everymeal.   Make exercise part of your daily routine. You may want to start with simple activities, such as walking, bicycling, or slow swimming. Try cesar active 30 to 60 minutes every day. You do not need to do all 30 to 60 minutes all at once. For example, you can exercise 3 times a day for 10 or 20 minutes. Moderate exercise is safe for most people, but it is always agood idea to talk to your doctor before starting an exercise program.   Keep moving. [place] the lawn, work in the garden, or [place]. Take the stairs instead of the elevator at work.  If you smoke, quit. Peoplewho smoke have an increased risk for heart attack, stroke, cancer, and other lung illnesses. Quitting is hard, but there are ways to boost your chance of quitting tobacco for good.    Use nicotine gum, patches, or lozenges.  Ask your doctor about stop-smoking programs and medicines.  Keep trying.  In addition to reducing your risk of diseases in the future, you will notice some benefits soon after you stop using tobacco. If you have shortness of breath or asthma symptoms, they will likely getbetter within a few weeks after you quit.  Limit how much alcohol you drink. Moderate amounts of alcohol (up to 2 drinks a day for men, 1drink a day for women) are okay. But drinking too much can lead to liver problems, high blood pressure, and other health problems.  health   If you have a family, there are many things you can do together to improve your health.  Eat meals together as a family as often as possible.  Eat healthy foods. This includes fruits, vegetables, lean meats and dairy, and whole grains.  Include your family in your fitness plan. Most peoplethink of activities such as jogging or tennis as the way to fitness, but there are many ways you and your family can be more active. Anything that makes you breathe hard and gets your heart pumping is exercise. Here are sometips:   Walk to do errands or to take your child to school or the bus.  Go for a family bike ride after dinner instead of watching TV.  Where can you learn more?  Go toLexpertia.comtps://[employer]Dem[employer]PV. org and sign in to your [employer] account. Enter U165 in the Search HealthInformation box to learn more about \"A Healthy Lifestyle: Care Instructions. \"     If you do not have anaccount, please click on the \"Sign Up Now\" link.  Current as of: July 26, 2016   Content Version: 11.2   © 4642-6787 [employer]. Care instructions adapted under license by [facility] ([facility]). If you have questions about a medical condition or this instruction, always ask your healthcare professional. [facility] disclaims any warranty or liability for your use of this information.

## 2022-03-25 ENCOUNTER — OFFICE VISIT (OUTPATIENT)
Dept: INTERNAL MEDICINE | Age: 67
End: 2022-03-25
Payer: MEDICARE

## 2022-03-25 VITALS
RESPIRATION RATE: 16 BRPM | BODY MASS INDEX: 33.07 KG/M2 | SYSTOLIC BLOOD PRESSURE: 130 MMHG | HEIGHT: 70 IN | DIASTOLIC BLOOD PRESSURE: 88 MMHG | WEIGHT: 231 LBS | HEART RATE: 101 BPM

## 2022-03-25 DIAGNOSIS — I10 ESSENTIAL HYPERTENSION: Primary | ICD-10-CM

## 2022-03-25 DIAGNOSIS — Z87.891 PERSONAL HISTORY OF TOBACCO USE: ICD-10-CM

## 2022-03-25 DIAGNOSIS — I63.9 CEREBROVASCULAR ACCIDENT (CVA), UNSPECIFIED MECHANISM (HCC): ICD-10-CM

## 2022-03-25 DIAGNOSIS — Z87.891 SMOKING HISTORY: ICD-10-CM

## 2022-03-25 DIAGNOSIS — E11.9 TYPE 2 DIABETES MELLITUS WITHOUT COMPLICATION, WITHOUT LONG-TERM CURRENT USE OF INSULIN (HCC): ICD-10-CM

## 2022-03-25 DIAGNOSIS — E83.42 HYPOMAGNESEMIA: ICD-10-CM

## 2022-03-25 DIAGNOSIS — E78.5 HYPERLIPIDEMIA, UNSPECIFIED HYPERLIPIDEMIA TYPE: ICD-10-CM

## 2022-03-25 DIAGNOSIS — E55.9 VITAMIN D DEFICIENCY: ICD-10-CM

## 2022-03-25 DIAGNOSIS — E53.8 VITAMIN B 12 DEFICIENCY: ICD-10-CM

## 2022-03-25 PROCEDURE — 3046F HEMOGLOBIN A1C LEVEL >9.0%: CPT | Performed by: INTERNAL MEDICINE

## 2022-03-25 PROCEDURE — 3017F COLORECTAL CA SCREEN DOC REV: CPT | Performed by: INTERNAL MEDICINE

## 2022-03-25 PROCEDURE — 1036F TOBACCO NON-USER: CPT | Performed by: INTERNAL MEDICINE

## 2022-03-25 PROCEDURE — 99214 OFFICE O/P EST MOD 30 MIN: CPT | Performed by: INTERNAL MEDICINE

## 2022-03-25 PROCEDURE — G8417 CALC BMI ABV UP PARAM F/U: HCPCS | Performed by: INTERNAL MEDICINE

## 2022-03-25 PROCEDURE — G8427 DOCREV CUR MEDS BY ELIG CLIN: HCPCS | Performed by: INTERNAL MEDICINE

## 2022-03-25 PROCEDURE — 2022F DILAT RTA XM EVC RTNOPTHY: CPT | Performed by: INTERNAL MEDICINE

## 2022-03-25 PROCEDURE — G8484 FLU IMMUNIZE NO ADMIN: HCPCS | Performed by: INTERNAL MEDICINE

## 2022-03-25 PROCEDURE — G0296 VISIT TO DETERM LDCT ELIG: HCPCS | Performed by: INTERNAL MEDICINE

## 2022-03-25 RX ORDER — LISINOPRIL 20 MG/1
TABLET ORAL
Qty: 90 TABLET | Refills: 3 | Status: SHIPPED | OUTPATIENT
Start: 2022-03-25 | End: 2022-08-22 | Stop reason: SDUPTHER

## 2022-03-25 RX ORDER — CLOPIDOGREL BISULFATE 75 MG/1
TABLET ORAL
Qty: 90 TABLET | Refills: 3 | Status: SHIPPED | OUTPATIENT
Start: 2022-03-25 | End: 2022-08-22 | Stop reason: SDUPTHER

## 2022-03-25 RX ORDER — CYCLOBENZAPRINE HCL 10 MG
TABLET ORAL
Qty: 270 TABLET | Refills: 3 | Status: SHIPPED | OUTPATIENT
Start: 2022-03-25 | End: 2022-08-22 | Stop reason: SDUPTHER

## 2022-03-25 RX ORDER — AMLODIPINE BESYLATE 10 MG/1
TABLET ORAL
Qty: 90 TABLET | Refills: 3 | Status: SHIPPED | OUTPATIENT
Start: 2022-03-25 | End: 2022-08-22 | Stop reason: SDUPTHER

## 2022-03-25 RX ORDER — ATORVASTATIN CALCIUM 40 MG/1
TABLET, FILM COATED ORAL
Qty: 90 TABLET | Refills: 3 | Status: SHIPPED | OUTPATIENT
Start: 2022-03-25 | End: 2022-08-22 | Stop reason: SDUPTHER

## 2022-03-25 ASSESSMENT — ENCOUNTER SYMPTOMS
EYE PAIN: 0
VOMITING: 0
BACK PAIN: 0
BLOOD IN STOOL: 0
CONSTIPATION: 0
SHORTNESS OF BREATH: 0
DIARRHEA: 0
ABDOMINAL PAIN: 0
NAUSEA: 0
COUGH: 0

## 2022-03-25 NOTE — PROGRESS NOTES
Low Dose CT (LDCT) Lung Screening criteria met:     Age 50-77(Medicare) or 50-80 ([facility])   Pack year smoking >20   Still smoking or less than 15 year since quit   No sign or symptoms of lung cancer   > 11 months since last LDCT     Risks and benefits of lung cancer screening with LDCT scans discussed:    Significance of positive screen - False-positive LDCT results often occur. 95% of all positive results do not lead to a diagnosis of cancer. Usually further imaging can resolve most false-positive results; however, some patients may require invasive procedures. Over diagnosis risk - 10% to 12% of screen-detected lung cancer cases are over diagnosed--that is, the cancer would not have been detected in the patient's lifetime without the screening. Need for follow up screens annually to continue lung cancer screening effectiveness     Risks associated with radiation from annual LDCT- Radiation exposure is about the same as for a mammogram, which is about 1/3 of the annual background radiation exposure from everyday life. Starting screening at age 54 is not likely to increase cancer risk from radiation exposure. Patients with comorbidities resulting in life expectancy of < 10 years, or that would preclude treatment of an abnormality identified on CT, should not be screened due to lack of benefit.     To obtain maximal benefit from this screening, smoking cessation and long-term abstinence from smoking is critical

## 2022-03-25 NOTE — LETTER
[address] Internal Med A department of [place] [address]  Phone: [phone]  Fax: [phone]    Quiana Romo MD        April 18, 2022     Zoey Jones  [address] [place] [id number]      Dear Suresh Martin: This letter is a reminder that your fasting lab tests are due. If you would like to schedule a lab appointment and/or if you have any questions regarding this notice, please contact our office at ph. [phone], option 1.       Sincerely,        Quiana Romo MD

## 2022-03-25 NOTE — PROGRESS NOTES
[place] [address]  Dept: [phone]  Dept Fax: [phone]  Loc: [phone]     Curtis Bailey is a 61 y.o. male who presents today for his medical conditions/complaintsas noted below. Curtis Bailey is c/o of   Chief Complaint   Patient presents with    Hypertension     6 month    Hyperlipidemia    Diabetes         HPI:     Hypertension  This is a chronic problem. The current episode started more than 1 year ago. The problem has been waxing and waning since onset. The problem is controlled. Pertinent negatives include no chest pain, headaches, neck pain, palpitations or shortness of breath. Hyperlipidemia  This is a chronic problem. The current episode started more than 1 year ago. The problem is controlled. Recent lipid tests were reviewed and are variable. Pertinent negatives include no chest pain or shortness of breath. Diabetes  He presents for his follow-up diabetic visit. He has type 2 diabetes mellitus. His disease course has been fluctuating. Pertinent negatives for hypoglycemia include no confusion, dizziness, headaches, nervousness/anxiousness or pallor. Pertinent negatives for diabetes include no chest pain, no polydipsia, no polyuria and no weakness. Hemoglobin A1C (%)   Date Value   07/13/2021 6.4 (H)   06/12/2020 6.2 (H)   01/03/2020 6.1 (H)            Microalb/Crt.  Ratio (mcg/mg creat)   Date Value   07/13/2021 CANNOT BE CALCULATED     LDL Cholesterol (mg/dL)   Date Value   07/13/2021 07/02/2019 51     LDL Calculated (mg/dL)   Date Value   11/02/2017 50         AST (U/L)   Date Value   07/13/2021 17     ALT (U/L)   Date Value   07/13/2021 26     BUN (mg/dL)   Date Value   07/13/2021 15     BP Readings from Last 3 Encounters:   03/25/22 130/88   03/23/22 138/82   10/18/21 135/89              Past Medical History:   Diagnosis Date    CVA (cerebral round, and reactive to light. Cardiovascular:      Rate and Rhythm: Normal rate and regular rhythm. Heart sounds: No murmur heard. No friction rub. No gallop. Pulmonary:      Effort: Pulmonary effort is normal.      Breath sounds: Normal breath sounds. No wheezing or rales. Abdominal:      General: There is no distension. Palpations: Abdomen is soft. There is no mass. Tenderness: There is no abdominal tenderness. There is no rebound. Musculoskeletal:         General: Normal range of motion. Cervical back: Neck supple. Lymphadenopathy:      Cervical: No cervical adenopathy. Skin:     General: Skin is warm and dry. Findings: No rash. Neurological:      Mental Status: He is alert and oriented to person, place, and time. Cranial Nerves: No cranial nerve deficit (grossly). Psychiatric:         Thought Content: Thought content normal.        /88 (Site: Right Upper Arm, Position: Sitting, Cuff Size: Large Adult)   Pulse 101   Resp 16   Ht 5' 10\" (1.778 m)   Wt 231 lb (104.8 kg)   BMI 33.15 kg/m²     Assessment:       Diagnosis Orders   1. Essential hypertension  Comprehensive Metabolic Panel    lisinopril (PRINIVIL;ZESTRIL) 20 MG tablet    amLODIPine (NORVASC) 10 MG tablet   2. Personal history of tobacco use  MO VISIT TO DISCUSS LUNG CA SCREEN W LDCT    CT Lung Screen (Annual)   3. Hyperlipidemia, unspecified hyperlipidemia type  Lipid Panel    Lipid Panel    atorvastatin (LIPITOR) 40 MG tablet   4. Cerebrovascular accident (CVA), unspecified mechanism ([facility] [address]  clopidogrel (PLAVIX) 75 MG tablet    Handicap Placard MISC   5. Type 2 diabetes mellitus without complication, without long-term current use of insulin ([facility])  Hemoglobin A1C    Microalbumin, Ur    Hemoglobin A1C    metFORMIN (GLUCOPHAGE) 500 MG tablet   6. Low back pain  cyclobenzaprine (FLEXERIL) 10 MG tablet    Handicap Placard MISC   7. Hypomagnesemia  Magnesium   8.  Vitamin D deficiency  Vitamin D 25 Hydroxy   9. Vitamin B 12 deficiency  Vitamin B12 & Folate   10. Smoking history  CT lung screen [Initial/Annual]   I reviewed multiple test results this point. 2021 normal total cholesterol 185. .    2021 normal diagnostic PSA 1.64.    2021 okay hemoglobin A1c at 6.4    Patient told to continue Lipitor, Metformin, Plavix, and aspirin. Plan:       Return in about 6 months (around 2022) for medicare AWV, Hypertension, Hyperlipidemia, Diabetes. Orders Placed This Encounter   Procedures    CT lung screen [Initial/Annual]     Age: 61 y.o. Smoking History:   Social History    Tobacco Use      Smoking status: Former Smoker        Packs/day: 2.00        Years: 30.00        Pack years: 61        Types: Cigarettes        Start date:         Quit date: 2016        Years since quittin.8      Smokeless tobacco: Never Used    Vaping Use      Vaping Use: Never used    Alcohol use: Not Currently    Drug use: Never    Pack years: 61  Last CT lung screen: 2021     Standing Status:   Future     Standing Expiration Date:   3/25/2023     Order Specific Question:   Is there documentation of shared decision making? Answer:   Yes     Order Specific Question:   Does the patient show any signs or symptoms of lung cancer? Answer:   No     Order Specific Question:   Is this the first (baseline) CT or an annual exam?     Answer: Annual [2]     Order Specific Question:   Is this a low dose CT or a routine CT? Answer:   Low Dose CT [1]     Order Specific Question:   Smoking Status? Answer: Former Smoker [4]     Order Specific Question:   Date quit smoking? (must be within 15 years)     Answer:   2016     Order Specific Question:   Smoking packs per day? Answer:   2     Order Specific Question:   Years smoking? Answer:   27    CT Lung Screen (Annual)     Age: Patient is 61 y.o.     Smoking History: Social History    Tobacco Use      Smoking status: Former Smoker Packs/day: 2.00        Years: 30.00        Pack years: 61        Types: Cigarettes        Start date: 12        Quit date: 2016        Years since quittin.8      Smokeless tobacco: Never Used    Vaping Use      Vaping Use: Never used    Alcohol use: Not Currently    Drug use: Never   Pack years: 61    Date of last lung cancer screenin2021     Standing Status:   Future     Standing Expiration Date:   2023     Order Specific Question:   Is there documentation of shared decision making? Answer:   Yes     Order Specific Question:   Is this a low dose CT or a routine CT? Answer:   Low Dose CT [1]     Order Specific Question:   Is this the first (baseline) CT or an annual exam?     Answer: Annual [2]     Order Specific Question:   Does the patient show any signs or symptoms of lung cancer? Answer:   No     Order Specific Question:   Smoking Status? Answer: Former Smoker [4]     Order Specific Question:   Date quit smoking? (must be within 15 years)     Answer:   2016     Order Specific Question:   Smoking packs per day? Answer:   2     Order Specific Question:   Years smoking?      Answer:   30    Comprehensive Metabolic Panel     Standing Status:   Future     Standing Expiration Date:   3/25/2023    Magnesium     Standing Status:   Future     Standing Expiration Date:   3/25/2023    Vitamin D 25 Hydroxy     Standing Status:   Future     Standing Expiration Date:   3/25/2023    Vitamin B12 & Folate     Standing Status:   Future     Standing Expiration Date:   3/25/2023    Hemoglobin A1C     Standing Status:   Future     Standing Expiration Date:   3/25/2023    Microalbumin, Ur     Standing Status:   Future     Standing Expiration Date:   3/25/2023    Lipid Panel     Standing Status:   Future     Standing Expiration Date:   3/25/2023     Order Specific Question:   Is Patient Fasting?/# of Hours     Answer:   yes    Hemoglobin A1C     Standing Status:   Future Standing Expiration Date:   3/25/2023    Lipid Panel     Standing Status:   Future     Standing Expiration Date:   3/25/2023     Order Specific Question:   Is Patient Fasting?/# of Hours     Answer:   yes    TN VISIT TO DISCUSS LUNG CA SCREEN W LDCT     Orders Placed This Encounter   Medications    atorvastatin (LIPITOR) 40 MG tablet     Sig: TAKE 1 TABLET DAILY     Dispense:  90 tablet     Refill:  3    clopidogrel (PLAVIX) 75 MG tablet     Sig: TAKE 1 TABLET DAILY     Dispense:  90 tablet     Refill:  3    lisinopril (PRINIVIL;ZESTRIL) 20 MG tablet     Sig: TAKE 1 TABLET DAILY     Dispense:  90 tablet     Refill:  3    metFORMIN (GLUCOPHAGE) 500 MG tablet     Sig: TAKE 1 TABLET TWICE A DAY     Dispense:  180 tablet     Refill:  3    amLODIPine (NORVASC) 10 MG tablet     Sig: TAKE 1 TABLET DAILY     Dispense:  90 tablet     Refill:  3    cyclobenzaprine (FLEXERIL) 10 MG tablet     Sig: TAKE 1 TABLET THREE TIMES A DAY     Dispense:  270 tablet     Refill:  3    Handicap Placard MISC     Sig: by Does not apply route Expires: 3/25/2027     Dispense:  1 each     Refill:  0       Patientgiven educational materials - see patient instructions. Discussed use, benefit,and side effects of prescribed medications. All patient questions answered. Ptvoiced understanding. Reviewed health maintenance. Instructed to continue currentmedications, diet and exercise. Patient agreed with treatment plan. Follow up asdirected.      Electronically signed by Сергей Pruitt MD on 3/25/2022 at 3:39 PM

## 2022-06-23 ENCOUNTER — TELEPHONE (OUTPATIENT)
Dept: ONCOLOGY | Age: 67
End: 2022-06-23

## 2022-06-23 NOTE — LETTER
6/23/2022        Kalina Quiñones  [address]  [facility] [address]    Dear Kalina Quiñones: Your healthcare provider has ordered a low dose CT scan of the chest for lung cancer screening. Enclosed you will find information about CT lung screening and smoking cessation resources. If you are unable to keep you appointment for you CT lung screening, please call our scheduling department at [phone]    Keep in mind that CT lung screening does not take the place of smoking cessation. Please do not hesitate to contact me if you have any questions or concerns.     [address],      [id number] [place] [address] Lung Screening Program  [phone]

## 2022-07-06 ENCOUNTER — HOSPITAL ENCOUNTER (OUTPATIENT)
Dept: CT IMAGING | Age: 67
Discharge: HOME | End: 2022-07-08
Payer: MEDICARE

## 2022-07-06 DIAGNOSIS — Z87.891 PERSONAL HISTORY OF TOBACCO USE: ICD-10-CM

## 2022-07-06 PROCEDURE — 71271 CT THORAX LUNG CANCER SCR C-: CPT

## 2022-08-05 ENCOUNTER — HOSPITAL ENCOUNTER (OUTPATIENT)
Dept: INTERVENTIONAL RADIOLOGY/VASCULAR | Age: 67
Discharge: HOME | End: 2022-08-07
Payer: MEDICARE

## 2022-08-05 ENCOUNTER — OFFICE VISIT (OUTPATIENT)
Dept: INTERNAL MEDICINE | Age: 67
End: 2022-08-05
Payer: MEDICARE

## 2022-08-05 ENCOUNTER — HOSPITAL ENCOUNTER (OUTPATIENT)
Dept: LAB | Age: 67
Discharge: HOME | End: 2022-08-05
Payer: MEDICARE

## 2022-08-05 VITALS
RESPIRATION RATE: 16 BRPM | HEIGHT: 70 IN | DIASTOLIC BLOOD PRESSURE: 84 MMHG | HEART RATE: 89 BPM | BODY MASS INDEX: 32.93 KG/M2 | SYSTOLIC BLOOD PRESSURE: 132 MMHG | WEIGHT: 230 LBS

## 2022-08-05 DIAGNOSIS — E11.9 TYPE 2 DIABETES MELLITUS WITHOUT COMPLICATION, WITHOUT LONG-TERM CURRENT USE OF INSULIN (HCC): ICD-10-CM

## 2022-08-05 DIAGNOSIS — E83.42 HYPOMAGNESEMIA: ICD-10-CM

## 2022-08-05 DIAGNOSIS — E78.2 MIXED HYPERLIPIDEMIA: ICD-10-CM

## 2022-08-05 DIAGNOSIS — E53.8 VITAMIN B 12 DEFICIENCY: ICD-10-CM

## 2022-08-05 DIAGNOSIS — I10 ESSENTIAL HYPERTENSION: ICD-10-CM

## 2022-08-05 DIAGNOSIS — I83.892 VARICOSE VEINS OF LEFT LEG WITH EDEMA: ICD-10-CM

## 2022-08-05 DIAGNOSIS — I83.892 VARICOSE VEINS OF LEFT LEG WITH EDEMA: Primary | ICD-10-CM

## 2022-08-05 DIAGNOSIS — E55.9 VITAMIN D DEFICIENCY: ICD-10-CM

## 2022-08-05 DIAGNOSIS — E78.5 HYPERLIPIDEMIA, UNSPECIFIED HYPERLIPIDEMIA TYPE: ICD-10-CM

## 2022-08-05 LAB
ALBUMIN SERPL-MCNC: 4.6 G/DL (ref 3.5–5.2)
ALBUMIN/GLOBULIN RATIO: 2 (ref 1–2.5)
ALP BLD-CCNC: 78 U/L (ref 40–129)
ALT SERPL-CCNC: 26 U/L (ref 5–41)
ANION GAP SERPL CALCULATED.3IONS-SCNC: 11 MMOL/L (ref 9–17)
AST SERPL-CCNC: 19 U/L
BILIRUB SERPL-MCNC: 0.53 MG/DL (ref 0.3–1.2)
BUN BLDV-MCNC: 15 MG/DL (ref 8–23)
BUN/CREAT BLD: 15 (ref 9–20)
CALCIUM SERPL-MCNC: 9.6 MG/DL (ref 8.6–10.4)
CHLORIDE BLD-SCNC: 101 MMOL/L (ref 98–107)
CHOLESTEROL/HDL RATIO: 4.7
CHOLESTEROL: 163 MG/DL
CO2: 26 MMOL/L (ref 20–31)
CREAT SERPL-MCNC: 0.98 MG/DL (ref 0.7–1.2)
CREATININE URINE: 40.9 MG/DL (ref 39–259)
FOLATE: 17.2 NG/ML
GFR AFRICAN AMERICAN: >60 ML/MIN
GFR NON-AFRICAN AMERICAN: >60 ML/MIN
GFR SERPL CREATININE-BSD FRML MDRD: ABNORMAL ML/MIN/{1.73_M2}
GLUCOSE BLD-MCNC: 119 MG/DL (ref 70–99)
HDLC SERPL-MCNC: 35 MG/DL
LDL CHOLESTEROL: 57 MG/DL (ref 0–130)
MAGNESIUM: 1.8 MG/DL (ref 1.6–2.6)
MICROALBUMIN/CREAT 24H UR: <12 MG/L
MICROALBUMIN/CREAT UR-RTO: NORMAL MCG/MG CREAT
POTASSIUM SERPL-SCNC: 4.9 MMOL/L (ref 3.7–5.3)
SODIUM BLD-SCNC: 138 MMOL/L (ref 135–144)
TOTAL PROTEIN: 6.9 G/DL (ref 6.4–8.3)
TRIGL SERPL-MCNC: 355 MG/DL
VITAMIN B-12: 627 PG/ML (ref 232–1245)
VITAMIN D 25-HYDROXY: 68.3 NG/ML

## 2022-08-05 PROCEDURE — 93971 EXTREMITY STUDY: CPT

## 2022-08-05 PROCEDURE — 82306 VITAMIN D 25 HYDROXY: CPT

## 2022-08-05 PROCEDURE — 83036 HEMOGLOBIN GLYCOSYLATED A1C: CPT

## 2022-08-05 PROCEDURE — G8417 CALC BMI ABV UP PARAM F/U: HCPCS | Performed by: INTERNAL MEDICINE

## 2022-08-05 PROCEDURE — 36415 COLL VENOUS BLD VENIPUNCTURE: CPT

## 2022-08-05 PROCEDURE — G8427 DOCREV CUR MEDS BY ELIG CLIN: HCPCS | Performed by: INTERNAL MEDICINE

## 2022-08-05 PROCEDURE — 1123F ACP DISCUSS/DSCN MKR DOCD: CPT | Performed by: INTERNAL MEDICINE

## 2022-08-05 PROCEDURE — 99214 OFFICE O/P EST MOD 30 MIN: CPT | Performed by: INTERNAL MEDICINE

## 2022-08-05 PROCEDURE — 83735 ASSAY OF MAGNESIUM: CPT

## 2022-08-05 PROCEDURE — 82746 ASSAY OF FOLIC ACID SERUM: CPT

## 2022-08-05 PROCEDURE — 3017F COLORECTAL CA SCREEN DOC REV: CPT | Performed by: INTERNAL MEDICINE

## 2022-08-05 PROCEDURE — 80061 LIPID PANEL: CPT

## 2022-08-05 PROCEDURE — 82607 VITAMIN B-12: CPT

## 2022-08-05 PROCEDURE — 82043 UR ALBUMIN QUANTITATIVE: CPT

## 2022-08-05 PROCEDURE — 80053 COMPREHEN METABOLIC PANEL: CPT

## 2022-08-05 PROCEDURE — 1036F TOBACCO NON-USER: CPT | Performed by: INTERNAL MEDICINE

## 2022-08-05 PROCEDURE — 2022F DILAT RTA XM EVC RTNOPTHY: CPT | Performed by: INTERNAL MEDICINE

## 2022-08-05 PROCEDURE — 3046F HEMOGLOBIN A1C LEVEL >9.0%: CPT | Performed by: INTERNAL MEDICINE

## 2022-08-05 PROCEDURE — 82570 ASSAY OF URINE CREATININE: CPT

## 2022-08-05 SDOH — ECONOMIC STABILITY: FOOD INSECURITY: WITHIN THE PAST 12 MONTHS, YOU WORRIED THAT YOUR FOOD WOULD RUN OUT BEFORE YOU GOT MONEY TO BUY MORE.: NEVER TRUE

## 2022-08-05 SDOH — ECONOMIC STABILITY: FOOD INSECURITY: WITHIN THE PAST 12 MONTHS, THE FOOD YOU BOUGHT JUST DIDN'T LAST AND YOU DIDN'T HAVE MONEY TO GET MORE.: NEVER TRUE

## 2022-08-05 ASSESSMENT — PATIENT HEALTH QUESTIONNAIRE - PHQ9
SUM OF ALL RESPONSES TO PHQ QUESTIONS 1-9: 0
SUM OF ALL RESPONSES TO PHQ QUESTIONS 1-9: 0
2. FEELING DOWN, DEPRESSED OR HOPELESS: 0
SUM OF ALL RESPONSES TO PHQ QUESTIONS 1-9: 0
SUM OF ALL RESPONSES TO PHQ9 QUESTIONS 1 & 2: 0
1. LITTLE INTEREST OR PLEASURE IN DOING THINGS: 0
SUM OF ALL RESPONSES TO PHQ QUESTIONS 1-9: 0

## 2022-08-05 ASSESSMENT — ENCOUNTER SYMPTOMS
ABDOMINAL PAIN: 0
BACK PAIN: 0
COUGH: 0
DIARRHEA: 0
VOMITING: 0
SHORTNESS OF BREATH: 0
NAUSEA: 0
BLOOD IN STOOL: 0
EYE PAIN: 0
CONSTIPATION: 0

## 2022-08-05 ASSESSMENT — SOCIAL DETERMINANTS OF HEALTH (SDOH): HOW HARD IS IT FOR YOU TO PAY FOR THE VERY BASICS LIKE FOOD, HOUSING, MEDICAL CARE, AND HEATING?: NOT HARD AT ALL

## 2022-08-05 NOTE — PROGRESS NOTES
[address]  [address] [place] [address]  Dept: [phone]  Dept Fax: [phone]  Loc: [phone]     Annika Cordoba is a 74 y.o. male who presents today for his medical conditions/complaintsas noted below. Annika Cordoba is c/o of   Chief Complaint   Patient presents with    Edema     Left ankle/foot    Hypertension    Diabetes         HPI:     Hypertension  This is a chronic problem. The current episode started more than 1 year ago. The problem has been waxing and waning since onset. The problem is controlled. Pertinent negatives include no chest pain, headaches, neck pain, palpitations or shortness of breath. Diabetes  He presents for his follow-up diabetic visit. He has type 2 diabetes mellitus. The initial diagnosis of diabetes was made 10 years ago. His disease course has been fluctuating. Pertinent negatives for hypoglycemia include no confusion, dizziness, headaches, nervousness/anxiousness or pallor. Pertinent negatives for diabetes include no chest pain, no polydipsia, no polyuria and no weakness. Other  This is a recurrent (3-left leg edema) problem. The current episode started in the past 7 days. The problem occurs intermittently. The problem has been waxing and waning. Pertinent negatives include no abdominal pain, arthralgias, chest pain, chills, coughing, fever, headaches, nausea, neck pain, numbness, rash, vomiting or weakness. Hemoglobin A1C (%)   Date Value   07/13/2021 6.4 (H)   06/12/2020 6.2 (H)   01/03/2020 6.1 (H)            Microalb/Crt.  Ratio (mcg/mg creat)   Date Value   07/13/2021 CANNOT BE CALCULATED     LDL Cholesterol (mg/dL)   Date Value   07/13/2021 07/02/2019 51     LDL Calculated (mg/dL)   Date Value   11/02/2017 50         AST (U/L)   Date Value   07/13/2021 17     ALT (U/L)   Date Value   07/13/2021 26     BUN (mg/dL)   Date Value   07/13/2021 15     BP Readings from Last 3 Encounters:   22 132/84   22 130/88   22 138/82              Past Medical History:   Diagnosis Date    CVA (cerebral vascular accident) ([facility] [address]     Diabetes mellitus ([facility]     History of back surgery     Measles     Osteoarthritis       Past Surgical History:   Procedure Laterality Date    BACK SURGERY      HERNIA REPAIR         Family History   Problem Relation Age of Onset    High Blood Pressure Mother     Cataracts Mother     Diabetes Maternal Grandmother           Social History     Tobacco Use    Smoking status: Former     Packs/day: 2.00     Years: 30.00     Pack years: 60.00     Types: Cigarettes     Start date:      Quit date: 2016     Years since quittin.1    Smokeless tobacco: Never   Substance Use Topics    Alcohol use: Not Currently         Current Outpatient Medications   Medication Sig Dispense Refill    atorvastatin (LIPITOR) 40 MG tablet TAKE 1 TABLET DAILY 90 tablet 3    clopidogrel (PLAVIX) 75 MG tablet TAKE 1 TABLET DAILY 90 tablet 3    lisinopril (PRINIVIL;ZESTRIL) 20 MG tablet TAKE 1 TABLET DAILY 90 tablet 3    metFORMIN (GLUCOPHAGE) 500 MG tablet TAKE 1 TABLET TWICE A  tablet 3    amLODIPine (NORVASC) 10 MG tablet TAKE 1 TABLET DAILY 90 tablet 3    cyclobenzaprine (FLEXERIL) 10 MG tablet TAKE 1 TABLET THREE TIMES A  tablet 3    Handicap Placard MISC by Does not apply route Expires: 3/25/2027 1 each 0    donepezil (ARICEPT) 10 MG tablet TAKE 1 TABLET DAILY WITH SUPPER 90 tablet 1    sildenafil (VIAGRA) 100 MG tablet Take 1 tablet by mouth daily as needed for Erectile Dysfunction 30 tablet 3    aspirin 81 MG tablet Take 81 mg by mouth daily      vitamin B-12 (CYANOCOBALAMIN) 1000 MCG tablet Take 1,000 mcg by mouth daily      Cholecalciferol (VITAMIN D3) 5000 units TABS Take 5,000 Units by mouth daily      vitamin E 400 UNIT capsule Take 400 Units by mouth daily      magnesium oxide (MAG-OX) 400 MG tablet Take 400 mg by mouth daily No current facility-administered medications for this visit. No Known Allergies    Health Maintenance   Topic Date Due    COVID-19 Vaccine (1) Never done    Pneumococcal 65+ years Vaccine (1 - PCV) Never done    HIV screen  Never done    Hepatitis C screen  Never done    DTaP/Tdap/Td vaccine (1 - Tdap) Never done    Colorectal Cancer Screen  Never done    Shingles vaccine (1 of 2) Never done    AAA screen  Never done    Depression Screen  06/22/2022    Annual Wellness Visit (AWV)  06/23/2022    Diabetic retinal exam  07/06/2022    A1C test (Diabetic or Prediabetic)  07/13/2022    Diabetic microalbuminuria test  07/13/2022    Lipids  07/13/2022    Flu vaccine (1) 09/01/2022    Prostate Specific Antigen (PSA) Screening or Monitoring  09/20/2022    Low dose CT lung screening  07/06/2023    Diabetic foot exam  08/05/2023    Hepatitis A vaccine  Aged Out    Hib vaccine  Aged Out    Meningococcal (ACWY) vaccine  Aged Out       Subjective:      Review of Systems   Constitutional:  Negative for chills and fever. HENT:  Negative for hearing loss. Eyes:  Negative for pain and visual disturbance. Respiratory:  Negative for cough and shortness of breath. Cardiovascular:  Negative for chest pain, palpitations and leg swelling. Gastrointestinal:  Negative for abdominal pain, blood in stool, constipation, diarrhea, nausea and vomiting. Endocrine: Negative for cold intolerance, polydipsia and polyuria. Genitourinary:  Negative for difficulty urinating, dysuria and hematuria. Musculoskeletal:  Negative for arthralgias, back pain, gait problem and neck pain. Skin:  Negative for pallor and rash. Neurological:  Negative for dizziness, weakness, numbness and headaches. Hematological:  Negative for adenopathy. Does not bruise/bleed easily. Psychiatric/Behavioral:  Negative for confusion. The patient is not nervous/anxious. Objective:     Physical Exam  Vitals reviewed.    Constitutional: Appearance: He is well-developed. HENT:      Head: Normocephalic and atraumatic. Eyes:      Pupils: Pupils are equal, round, and reactive to light. Cardiovascular:      Rate and Rhythm: Normal rate and regular rhythm. Heart sounds: No murmur heard. No friction rub. No gallop. Pulmonary:      Effort: Pulmonary effort is normal.      Breath sounds: Normal breath sounds. No wheezing or rales. Abdominal:      General: There is no distension. Palpations: Abdomen is soft. There is no mass. Tenderness: There is no abdominal tenderness. There is no rebound. Musculoskeletal:         General: Normal range of motion. Cervical back: Neck supple. Left lower leg: Edema present. Comments: Mild edema in left foot and ankle   Lymphadenopathy:      Cervical: No cervical adenopathy. Skin:     General: Skin is warm and dry. Findings: No rash. Neurological:      Mental Status: He is alert and oriented to person, place, and time. Cranial Nerves: No cranial nerve deficit (grossly). Comments: Diabetic foot exam is normal including normal monofilament sensory test, normal vibration sense and normal pulses bilaterally. Psychiatric:         Thought Content: Thought content normal.      /84 (Site: Right Upper Arm, Position: Sitting, Cuff Size: Large Adult)   Pulse 89   Resp 16   Ht 5' 10\" (1.778 m)   Wt 230 lb (104.3 kg)   BMI 33.00 kg/m²     Assessment:       Diagnosis Orders   1. Varicose veins of left leg with edema  US DUP LOWER EXTREMITY LEFT ANJEL      2. Mixed hyperlipidemia        3. Essential hypertension        4. Type 2 diabetes mellitus without complication, without long-term current use of insulin ([facility])   DIABETES FOOT EXAM      Multiple test results for this appointment. 7/13/2021 normal total cholesterol 185    7/13/2021 okay A1c at 6.4    7/6/2022 CT lung screening okay/negative    Patient told to continue Lipitor and Plavix.     Patient's foot and ankle edema seem mild and does go down with leg elevation but to be conservative we will do a Doppler study to rule out DVT. We also told him potentially he could use compression stockings and put them on early in the morning to help minimize this. We told him also to decrease salt in diet         Plan:       Return if symptoms worsen or fail to improve, for medicare AWV, Hypertension, Diabetes. Orders Placed This Encounter   Procedures    US DUP LOWER EXTREMITY LEFT ANJEL     Standing Status:   Future     Standing Expiration Date:   8/5/2023     Order Specific Question:   Reason for exam:     Answer:   leg / ankle swelling    HM DIABETES FOOT EXAM       No orders of the defined types were placed in this encounter. Patientgiven educational materials - see patient instructions. Discussed use, benefit,and side effects of prescribed medications. All patient questions answered. Ptvoiced understanding. Reviewed health maintenance. Instructed to continue currentmedications, diet and exercise. Patient agreed with treatment plan. Follow up asdirected.      Electronically signed by Zander Willams MD on 8/5/2022 at 11:06 AM

## 2022-08-07 LAB
ESTIMATED AVERAGE GLUCOSE: 134 MG/DL
HBA1C MFR BLD: 6.3 % (ref 4–6)

## 2022-08-22 DIAGNOSIS — E11.9 TYPE 2 DIABETES MELLITUS WITHOUT COMPLICATION, WITHOUT LONG-TERM CURRENT USE OF INSULIN (HCC): ICD-10-CM

## 2022-08-22 DIAGNOSIS — E78.5 HYPERLIPIDEMIA, UNSPECIFIED HYPERLIPIDEMIA TYPE: ICD-10-CM

## 2022-08-22 DIAGNOSIS — I63.9 CEREBROVASCULAR ACCIDENT (CVA), UNSPECIFIED MECHANISM (HCC): ICD-10-CM

## 2022-08-22 DIAGNOSIS — R41.3 MEMORY LOSS: ICD-10-CM

## 2022-08-22 DIAGNOSIS — I10 ESSENTIAL HYPERTENSION: ICD-10-CM

## 2022-08-22 RX ORDER — CYCLOBENZAPRINE HCL 10 MG
TABLET ORAL
Qty: 270 TABLET | Refills: 3 | Status: SHIPPED | OUTPATIENT
Start: 2022-08-22

## 2022-08-22 RX ORDER — LISINOPRIL 20 MG/1
TABLET ORAL
Qty: 90 TABLET | Refills: 3 | Status: SHIPPED | OUTPATIENT
Start: 2022-08-22

## 2022-08-22 RX ORDER — CLOPIDOGREL BISULFATE 75 MG/1
TABLET ORAL
Qty: 90 TABLET | Refills: 3 | Status: SHIPPED | OUTPATIENT
Start: 2022-08-22

## 2022-08-22 RX ORDER — ATORVASTATIN CALCIUM 40 MG/1
TABLET, FILM COATED ORAL
Qty: 90 TABLET | Refills: 3 | Status: SHIPPED | OUTPATIENT
Start: 2022-08-22

## 2022-08-22 RX ORDER — DONEPEZIL HYDROCHLORIDE 10 MG/1
TABLET, FILM COATED ORAL
Qty: 90 TABLET | Refills: 1 | Status: SHIPPED | OUTPATIENT
Start: 2022-08-22

## 2022-08-22 RX ORDER — AMLODIPINE BESYLATE 10 MG/1
TABLET ORAL
Qty: 90 TABLET | Refills: 3 | Status: SHIPPED | OUTPATIENT
Start: 2022-08-22

## 2022-10-03 ENCOUNTER — HOSPITAL ENCOUNTER (OUTPATIENT)
Dept: LAB | Age: 67
Discharge: HOME | End: 2022-10-03
Payer: MEDICARE

## 2022-10-03 DIAGNOSIS — R97.20 ELEVATED PSA: ICD-10-CM

## 2022-10-03 LAB — PROSTATE SPECIFIC ANTIGEN: 1.48 NG/ML

## 2022-10-03 PROCEDURE — 84153 ASSAY OF PSA TOTAL: CPT

## 2022-10-03 PROCEDURE — 36415 COLL VENOUS BLD VENIPUNCTURE: CPT

## 2022-10-10 ENCOUNTER — OFFICE VISIT (OUTPATIENT)
Dept: UROLOGY | Age: 67
End: 2022-10-10
Payer: MEDICARE

## 2022-10-10 VITALS
BODY MASS INDEX: 33 KG/M2 | DIASTOLIC BLOOD PRESSURE: 84 MMHG | HEIGHT: 70 IN | SYSTOLIC BLOOD PRESSURE: 138 MMHG | HEART RATE: 107 BPM

## 2022-10-10 DIAGNOSIS — R97.20 ELEVATED PSA: Primary | ICD-10-CM

## 2022-10-10 DIAGNOSIS — N52.9 ERECTILE DYSFUNCTION, UNSPECIFIED ERECTILE DYSFUNCTION TYPE: ICD-10-CM

## 2022-10-10 PROCEDURE — 3017F COLORECTAL CA SCREEN DOC REV: CPT | Performed by: UROLOGY

## 2022-10-10 PROCEDURE — 99214 OFFICE O/P EST MOD 30 MIN: CPT | Performed by: UROLOGY

## 2022-10-10 PROCEDURE — G8417 CALC BMI ABV UP PARAM F/U: HCPCS | Performed by: UROLOGY

## 2022-10-10 PROCEDURE — G8484 FLU IMMUNIZE NO ADMIN: HCPCS | Performed by: UROLOGY

## 2022-10-10 PROCEDURE — 1036F TOBACCO NON-USER: CPT | Performed by: UROLOGY

## 2022-10-10 PROCEDURE — G8427 DOCREV CUR MEDS BY ELIG CLIN: HCPCS | Performed by: UROLOGY

## 2022-10-10 PROCEDURE — 1123F ACP DISCUSS/DSCN MKR DOCD: CPT | Performed by: UROLOGY

## 2022-10-10 RX ORDER — TADALAFIL 5 MG/1
5 TABLET ORAL DAILY
Qty: 30 TABLET | Refills: 11 | Status: SHIPPED | OUTPATIENT
Start: 2022-10-10

## 2022-10-24 ENCOUNTER — OFFICE VISIT (OUTPATIENT)
Dept: INTERNAL MEDICINE | Age: 67
End: 2022-10-24
Payer: MEDICARE

## 2022-10-24 VITALS
OXYGEN SATURATION: 97 % | WEIGHT: 229 LBS | HEIGHT: 70 IN | RESPIRATION RATE: 16 BRPM | SYSTOLIC BLOOD PRESSURE: 132 MMHG | DIASTOLIC BLOOD PRESSURE: 88 MMHG | BODY MASS INDEX: 32.78 KG/M2 | HEART RATE: 96 BPM

## 2022-10-24 DIAGNOSIS — I10 ESSENTIAL HYPERTENSION: ICD-10-CM

## 2022-10-24 DIAGNOSIS — E78.2 MIXED HYPERLIPIDEMIA: ICD-10-CM

## 2022-10-24 DIAGNOSIS — Z00.00 MEDICARE ANNUAL WELLNESS VISIT, SUBSEQUENT: ICD-10-CM

## 2022-10-24 DIAGNOSIS — E11.49 TYPE 2 DIABETES MELLITUS WITH OTHER NEUROLOGIC COMPLICATION, WITHOUT LONG-TERM CURRENT USE OF INSULIN (HCC): ICD-10-CM

## 2022-10-24 DIAGNOSIS — Z00.00 GENERAL MEDICAL EXAM: Primary | ICD-10-CM

## 2022-10-24 PROBLEM — E11.9 TYPE 2 DIABETES MELLITUS WITHOUT COMPLICATION, WITHOUT LONG-TERM CURRENT USE OF INSULIN (HCC): Status: RESOLVED | Noted: 2019-06-04 | Resolved: 2022-10-24

## 2022-10-24 PROCEDURE — 1123F ACP DISCUSS/DSCN MKR DOCD: CPT | Performed by: INTERNAL MEDICINE

## 2022-10-24 PROCEDURE — 99214 OFFICE O/P EST MOD 30 MIN: CPT | Performed by: INTERNAL MEDICINE

## 2022-10-24 PROCEDURE — G8427 DOCREV CUR MEDS BY ELIG CLIN: HCPCS | Performed by: INTERNAL MEDICINE

## 2022-10-24 PROCEDURE — G8484 FLU IMMUNIZE NO ADMIN: HCPCS | Performed by: INTERNAL MEDICINE

## 2022-10-24 PROCEDURE — 3044F HG A1C LEVEL LT 7.0%: CPT | Performed by: INTERNAL MEDICINE

## 2022-10-24 PROCEDURE — G8417 CALC BMI ABV UP PARAM F/U: HCPCS | Performed by: INTERNAL MEDICINE

## 2022-10-24 PROCEDURE — 2022F DILAT RTA XM EVC RTNOPTHY: CPT | Performed by: INTERNAL MEDICINE

## 2022-10-24 PROCEDURE — 3017F COLORECTAL CA SCREEN DOC REV: CPT | Performed by: INTERNAL MEDICINE

## 2022-10-24 PROCEDURE — 1036F TOBACCO NON-USER: CPT | Performed by: INTERNAL MEDICINE

## 2022-10-24 PROCEDURE — G0439 PPPS, SUBSEQ VISIT: HCPCS | Performed by: INTERNAL MEDICINE

## 2022-10-24 ASSESSMENT — ENCOUNTER SYMPTOMS
CONSTIPATION: 0
VOMITING: 0
ABDOMINAL PAIN: 0
NAUSEA: 0
BACK PAIN: 0
SHORTNESS OF BREATH: 0
COUGH: 0
BLOOD IN STOOL: 0
EYE PAIN: 0
DIARRHEA: 0

## 2022-10-24 ASSESSMENT — PATIENT HEALTH QUESTIONNAIRE - PHQ9
SUM OF ALL RESPONSES TO PHQ QUESTIONS 1-9: 2
SUM OF ALL RESPONSES TO PHQ QUESTIONS 1-9: 2
1. LITTLE INTEREST OR PLEASURE IN DOING THINGS: 1
SUM OF ALL RESPONSES TO PHQ9 QUESTIONS 1 & 2: 2
2. FEELING DOWN, DEPRESSED OR HOPELESS: 1
SUM OF ALL RESPONSES TO PHQ QUESTIONS 1-9: 2
SUM OF ALL RESPONSES TO PHQ QUESTIONS 1-9: 2

## 2022-10-24 ASSESSMENT — LIFESTYLE VARIABLES
HOW OFTEN DO YOU HAVE A DRINK CONTAINING ALCOHOL: NEVER
HOW MANY STANDARD DRINKS CONTAINING ALCOHOL DO YOU HAVE ON A TYPICAL DAY: PATIENT DOES NOT DRINK

## 2022-10-24 NOTE — PROGRESS NOTES
[place] [address]  Dept: [phone]  Dept Fax: [phone]  Loc: [phone]     Ryanne Ward is a 74 y.o. male who presents today for his medical conditions/complaintsas noted below. Ryanne Ward is c/o of   Chief Complaint   Patient presents with    Medicare AWV     6 month    Hypertension    Hyperlipidemia    Diabetes         HPI:     Hypertension  This is a chronic problem. The current episode started more than 1 year ago. The problem has been waxing and waning since onset. The problem is controlled. Pertinent negatives include no chest pain, headaches, neck pain, palpitations or shortness of breath. Hyperlipidemia  This is a chronic problem. The current episode started more than 1 year ago. The problem is controlled. Recent lipid tests were reviewed and are variable. Pertinent negatives include no chest pain or shortness of breath. Diabetes  He presents for his follow-up diabetic visit. He has type 2 diabetes mellitus. The initial diagnosis of diabetes was made 11 years ago. His disease course has been fluctuating. Pertinent negatives for hypoglycemia include no confusion, dizziness, headaches, nervousness/anxiousness or pallor. Pertinent negatives for diabetes include no chest pain, no polydipsia, no polyuria and no weakness. Other  This is a recurrent (4-General medical exam) problem. The current episode started today. The problem occurs intermittently. The problem has been waxing and waning. Pertinent negatives include no abdominal pain, arthralgias, chest pain, chills, coughing, fever, headaches, nausea, neck pain, numbness, rash, vomiting or weakness. Hemoglobin A1C (%)   Date Value   08/05/2022 6.3 (H)   07/13/2021 6.4 (H)   06/12/2020 6.2 (H)            Microalb/Crt.  Ratio (mcg/mg creat)   Date Value   08/05/2022 Can not be calculated     LDL Cholesterol (mg/dL) Date Value   2022 57   2021 51     LDL Calculated (mg/dL)   Date Value   2017 50         AST (U/L)   Date Value   2022 19     ALT (U/L)   Date Value   2022 26     BUN (mg/dL)   Date Value   2022 15     BP Readings from Last 3 Encounters:   10/24/22 132/88   10/10/22 138/84   22 132/84              Past Medical History:   Diagnosis Date    CVA (cerebral vascular accident) ([facility] [address]     Diabetes mellitus ([facility] [address]     History of back surgery     Measles     Osteoarthritis       Past Surgical History:   Procedure Laterality Date    BACK SURGERY      HERNIA REPAIR         Family History   Problem Relation Age of Onset    High Blood Pressure Mother     Cataracts Mother     Diabetes Maternal Grandmother           Social History     Tobacco Use    Smoking status: Former     Packs/day: 2.00     Years: 30.00     Pack years: 60.00     Types: Cigarettes     Start date:      Quit date: 2016     Years since quittin.4    Smokeless tobacco: Never   Substance Use Topics    Alcohol use: Not Currently         Current Outpatient Medications   Medication Sig Dispense Refill    tadalafil (CIALIS) 5 MG tablet Take 1 tablet by mouth daily 30 tablet 11    amLODIPine (NORVASC) 10 MG tablet TAKE 1 TABLET DAILY 90 tablet 3    atorvastatin (LIPITOR) 40 MG tablet TAKE 1 TABLET DAILY 90 tablet 3    cyclobenzaprine (FLEXERIL) 10 MG tablet TAKE 1 TABLET THREE TIMES A  tablet 3    donepezil (ARICEPT) 10 MG tablet TAKE 1 TABLET DAILY WITH SUPPER 90 tablet 1    metFORMIN (GLUCOPHAGE) 500 MG tablet TAKE 1 TABLET TWICE A  tablet 3    lisinopril (PRINIVIL;ZESTRIL) 20 MG tablet TAKE 1 TABLET DAILY 90 tablet 3    clopidogrel (PLAVIX) 75 MG tablet TAKE 1 TABLET DAILY 90 tablet 3    Handicap Placard MISC by Does not apply route Expires: 3/25/2027 1 each 0    aspirin 81 MG tablet Take 81 mg by mouth daily      vitamin B-12 (CYANOCOBALAMIN) 1000 MCG tablet Take 1,000 mcg by mouth daily      Cholecalciferol (VITAMIN D3) 5000 units TABS Take 5,000 Units by mouth daily      vitamin E 400 UNIT capsule Take 400 Units by mouth daily      magnesium oxide (MAG-OX) 400 MG tablet Take 400 mg by mouth daily       No current facility-administered medications for this visit. No Known Allergies    Health Maintenance   Topic Date Due    COVID-19 Vaccine (1) Never done    Pneumococcal 65+ years Vaccine (1 - PCV) Never done    HIV screen  Never done    Hepatitis C screen  Never done    DTaP/Tdap/Td vaccine (1 - Tdap) Never done    Colorectal Cancer Screen  Never done    Shingles vaccine (1 of 2) Never done    AAA screen  Never done    Diabetic retinal exam  07/06/2022    Flu vaccine (1) Never done    Low dose CT lung screening  07/06/2023    Diabetic foot exam  08/05/2023    A1C test (Diabetic or Prediabetic)  08/05/2023    Diabetic microalbuminuria test  08/05/2023    Lipids  08/05/2023    Depression Screen  08/05/2023    Annual Wellness Visit (AWV)  10/25/2023    Hepatitis A vaccine  Aged Out    Hib vaccine  Aged Out    Meningococcal (ACWY) vaccine  Aged Out       Subjective:      Review of Systems   Constitutional:  Negative for chills and fever. HENT:  Negative for hearing loss. Eyes:  Negative for pain and visual disturbance. Respiratory:  Negative for cough and shortness of breath. Cardiovascular:  Negative for chest pain, palpitations and leg swelling. Gastrointestinal:  Negative for abdominal pain, blood in stool, constipation, diarrhea, nausea and vomiting. Endocrine: Negative for cold intolerance, polydipsia and polyuria. Genitourinary:  Negative for difficulty urinating, dysuria and hematuria. Musculoskeletal:  Negative for arthralgias, back pain, gait problem and neck pain. Skin:  Negative for pallor and rash. Neurological:  Negative for dizziness, weakness, numbness and headaches. Hematological:  Negative for adenopathy. Does not bruise/bleed easily. Psychiatric/Behavioral:  Negative for confusion. The patient is not nervous/anxious. Objective:     Physical Exam  Vitals reviewed. Constitutional:       Appearance: He is well-developed. HENT:      Head: Normocephalic and atraumatic. Eyes:      Pupils: Pupils are equal, round, and reactive to light. Cardiovascular:      Rate and Rhythm: Normal rate and regular rhythm. Heart sounds: No murmur heard. No friction rub. No gallop. Pulmonary:      Effort: Pulmonary effort is normal.      Breath sounds: Normal breath sounds. No wheezing or rales. Abdominal:      General: There is no distension. Palpations: Abdomen is soft. There is no mass. Tenderness: There is no abdominal tenderness. There is no rebound. Musculoskeletal:         General: Normal range of motion. Cervical back: Neck supple. Lymphadenopathy:      Cervical: No cervical adenopathy. Skin:     General: Skin is warm and dry. Findings: No rash. Neurological:      Mental Status: He is alert and oriented to person, place, and time. Cranial Nerves: No cranial nerve deficit (grossly). Psychiatric:         Thought Content: Thought content normal.      /88 (Site: Left Upper Arm, Position: Sitting, Cuff Size: Large Adult)   Pulse 96   Resp 16   Ht 5' 10\" (1.778 m)   Wt 229 lb (103.9 kg)   SpO2 97%   BMI 32.86 kg/m²     Assessment:       Diagnosis Orders   1. General medical exam        2. Medicare annual wellness visit, subsequent        3. Mixed hyperlipidemia  Lipid Panel      4. Type 2 diabetes mellitus with other neurologic complication, without long-term current use of insulin (HCC)  Hemoglobin A1C      5. Essential hypertension           Viewed multiple test results for this appointment. 8/5/2022 normal total cholesterol 163    10/3/2022 normal PSA 1.48    8/5/2022 okay A1c at 6.3    Patient told to continue Plavix, Lipitor, and aspirin.        Plan:       Return in about 27 weeks (around 5/1/2023) for Hypertension, Hyperlipidemia, Diabetes. Orders Placed This Encounter   Procedures    Lipid Panel     Standing Status:   Future     Standing Expiration Date:   10/24/2023     Order Specific Question:   Is Patient Fasting?/# of Hours     Answer:   15     Order Specific Question:   Has the patient fasted? Answer:   Yes    Hemoglobin A1C     Standing Status:   Future     Standing Expiration Date:   10/24/2023       No orders of the defined types were placed in this encounter. Patientgiven educational materials - see patient instructions. Discussed use, benefit,and side effects of prescribed medications. All patient questions answered. Ptvoiced understanding. Reviewed health maintenance. Instructed to continue currentmedications, diet and exercise. Patient agreed with treatment plan. Follow up asdirected.      Electronically signed by Eduard Landrum MD on 10/24/2022 at 4:33 PM

## 2022-10-24 NOTE — PROGRESS NOTES
Medicare Annual Wellness Visit    [address] is here for Medicare AWV (6 month), Hypertension, Hyperlipidemia, and Diabetes    Assessment & Plan    Recommendations for Preventive Services Due: see orders and patient instructions/AVS.  Recommended screening schedule for the next 5-10 years is provided to the patient in written form: see Patient Instructions/AVS.     Return in about 27 weeks (around 5/1/2023) for Hypertension, Hyperlipidemia, Diabetes. Subjective       Patient's complete Health Risk Assessment and screening values have been reviewed and are found in Flowsheets. The following problems were reviewed today and where indicated follow up appointments were made and/or referrals ordered. Positive Risk Factor Screenings with Interventions:             General Health and ACP:  General  In general, how would you say your health is?: Good  In the past 7 days, have you experienced any of the following: New or Increased Pain, New or Increased Fatigue, Loneliness, Social Isolation, Stress or Anger?: No  Do you get the social and emotional support that you need?: Yes  Do you have a Living Will?: Yes    Advance Directives       Power of  Living Will ACP-Advance Directive ACP-Power of     Not on File Not on File Not on File Not on File        General Health Risk Interventions:  na    Health Habits/Nutrition:  Physical Activity: Insufficiently Active    Days of Exercise per Week: 1 day    Minutes of Exercise per Session: 60 min     Have you lost any weight without trying in the past 3 months?: No  Body mass index: (!) 32.85  Have you seen the dentist within the past year?: (!) No  Health Habits/Nutrition Interventions:  Dental exam overdue:  patient declines dental evaluation  .     Hearing/Vision:  Do you or your family notice any trouble with your hearing that hasn't been managed with hearing aids?: No  Do you have difficulty driving, watching TV, or doing any of your daily activities because of your eyesight?: No  Have you had an eye exam within the past year?: (!) No  No results found. Hearing/Vision Interventions:  Vision concerns:  patient declines any further evaluation/treatment for this issue     ADLs:  In the past 7 days, did you need help from others to perform any of the following everyday activities: Eating, dressing, grooming, bathing, toileting, or walking/balance?: No  In the past 7 days, did you need help from others to take care of any of the following: Laundry, housekeeping, banking/finances, shopping, telephone use, food preparation, transportation, or taking medications?: (!) Yes  Select all that apply: [employer], Food Preparation, Transportation, Banking/Finances, Taking Medications  ADL Interventions: Wife helps out with all ADL's          Objective   Vitals:    10/24/22 1603   BP: 132/88   Site: Left Upper Arm   Position: Sitting   Cuff Size: Large Adult   Pulse: 96   Resp: 16   SpO2: 97%   Weight: 229 lb (103.9 kg)   Height: 5' 10\" (1.778 m)      Body mass index is 32.86 kg/m². No Known Allergies  Prior to Visit Medications    Medication Sig Taking?  Authorizing Provider   tadalafil (CIALIS) 5 MG tablet Take 1 tablet by mouth daily  Laney Baeza MD   amLODIPine (NORVASC) 10 MG tablet TAKE 1 TABLET DAILY  Mesfin Morin MD   atorvastatin (LIPITOR) 40 MG tablet TAKE 1 TABLET DAILY  Mesfin Morin MD   cyclobenzaprine (FLEXERIL) 10 MG tablet TAKE 1 TABLET THREE TIMES A DAY  Mesfin Morin MD   donepezil (ARICEPT) 10 MG tablet TAKE 1 TABLET DAILY WITH SUPPER  Mesfin Morin MD   metFORMIN (GLUCOPHAGE) 500 MG tablet TAKE 1 TABLET TWICE A DAY  Mesfin Morin MD   lisinopril (PRINIVIL;ZESTRIL) 20 MG tablet TAKE 1 TABLET DAILY  Mesfin Morin MD   clopidogrel (PLAVIX) 75 MG tablet TAKE 1 TABLET DAILY  MD Vinny Toney MISC by Does not apply route Expires: 3/25/2027  Mesfin Morin MD   aspirin 81 MG tablet Take 81 mg by mouth daily  Historical Provider, MD   vitamin B-12 (CYANOCOBALAMIN) 1000 MCG tablet Take 1,000 mcg by mouth daily  Historical Provider, MD   Cholecalciferol (VITAMIN D3) 5000 units TABS Take 5,000 Units by mouth daily  Historical Provider, MD   vitamin E 400 UNIT capsule Take 400 Units by mouth daily  Historical Provider, MD   magnesium oxide (MAG-OX) 400 MG tablet Take 400 mg by mouth daily  Historical Provider, MD       CareTeam (Including outside providers/suppliers regularly involved in providing care):   Patient Care Team:  Rajesh Rodriguez MD as PCP - General (Internal Medicine)  Rajesh Rodriguez MD as PCP - REHABILITATION [facility] Empaneled Provider     Reviewed and updated this visit:  Tobacco  Allergies  Meds  Problems  Med Hx  Surg Hx  Soc Hx  Fam Hx                   I, Dr. Itzel Mercado, directly supervised the performance of this Medicare annual wellness visit.

## 2022-10-24 NOTE — PATIENT INSTRUCTIONS
Personalized Preventive Plan for Miguelito Bautista - 10/24/2022  Medicare offers a range of preventive health benefits. Some of the tests and screenings are paid in full while other may be subject to a deductible, co-insurance, and/or copay. Some of these benefits include a comprehensive review of your medical history including lifestyle, illnesses that may run in your family, and various assessments and screenings as appropriate. After reviewing your medical record and screening and assessments performed today your provider may have ordered immunizations, labs, imaging, and/or referrals for you. A list of these orders (if applicable) as well as your Preventive Care list are included within your After Visit Summary for your review. Other Preventive Recommendations:    A preventive eye exam performed by an eye specialist is recommended every 1-2 years to screen for glaucoma; cataracts, macular degeneration, and other eye disorders. A preventive dental visit is recommended every 6 months. Try to get at least 150 minutes of exercise per week or 10,000 steps per day on a pedometer . Order or download the FREE \"Exercise & Physical Activity: Your Everyday Guide\" from The "[employer]. Call 8-813.304.6091 or search The [employer] online. You need 4946-9745 mg of calcium and 3963-3719 IU of vitamin D per day. It is possible to meet your calcium requirement with diet alone, but a vitamin D supplement is usually necessary to meet this goal.  When exposed to the sun, use a sunscreen that protects against both UVA and UVB radiation with an SPF of 30 or greater. Reapply every 2 to 3 hours or after sweating, drying off with a towel, or swimming. Always wear a seat belt when traveling in a car. Always wear a helmet when riding a bicycle or motorcycle.

## 2022-12-07 ENCOUNTER — OFFICE VISIT (OUTPATIENT)
Dept: NEUROLOGY | Age: 67
End: 2022-12-07
Payer: MEDICARE

## 2022-12-07 VITALS
WEIGHT: 228 LBS | SYSTOLIC BLOOD PRESSURE: 126 MMHG | HEIGHT: 70 IN | HEART RATE: 98 BPM | DIASTOLIC BLOOD PRESSURE: 82 MMHG | OXYGEN SATURATION: 94 % | BODY MASS INDEX: 32.64 KG/M2

## 2022-12-07 DIAGNOSIS — H53.461 RIGHT HOMONYMOUS HEMIANOPSIA: ICD-10-CM

## 2022-12-07 DIAGNOSIS — Z98.890 HISTORY OF BACK SURGERY: ICD-10-CM

## 2022-12-07 DIAGNOSIS — E11.42 TYPE 2 DIABETES MELLITUS WITH DIABETIC POLYNEUROPATHY, WITHOUT LONG-TERM CURRENT USE OF INSULIN (HCC): ICD-10-CM

## 2022-12-07 DIAGNOSIS — Z87.898 H/O SYNCOPE: ICD-10-CM

## 2022-12-07 DIAGNOSIS — R41.3 MEMORY LOSS: ICD-10-CM

## 2022-12-07 DIAGNOSIS — I63.89 INFARCTION OF TEMPORAL LOBE (HCC): ICD-10-CM

## 2022-12-07 DIAGNOSIS — I10 ESSENTIAL HYPERTENSION: ICD-10-CM

## 2022-12-07 DIAGNOSIS — I63.00 CEREBROVASCULAR ACCIDENT (CVA) DUE TO THROMBOSIS OF PRECEREBRAL ARTERY (HCC): Primary | ICD-10-CM

## 2022-12-07 DIAGNOSIS — I63.9 OCCIPITAL CEREBRAL INFARCTION (HCC): ICD-10-CM

## 2022-12-07 DIAGNOSIS — H35.033 HYPERTENSIVE RETINOPATHY OF BOTH EYES: ICD-10-CM

## 2022-12-07 PROCEDURE — 1123F ACP DISCUSS/DSCN MKR DOCD: CPT | Performed by: PSYCHIATRY & NEUROLOGY

## 2022-12-07 PROCEDURE — 99213 OFFICE O/P EST LOW 20 MIN: CPT | Performed by: PSYCHIATRY & NEUROLOGY

## 2022-12-07 PROCEDURE — 99214 OFFICE O/P EST MOD 30 MIN: CPT | Performed by: PSYCHIATRY & NEUROLOGY

## 2022-12-07 PROCEDURE — 3044F HG A1C LEVEL LT 7.0%: CPT | Performed by: PSYCHIATRY & NEUROLOGY

## 2022-12-07 PROCEDURE — 3017F COLORECTAL CA SCREEN DOC REV: CPT | Performed by: PSYCHIATRY & NEUROLOGY

## 2022-12-07 PROCEDURE — 2022F DILAT RTA XM EVC RTNOPTHY: CPT | Performed by: PSYCHIATRY & NEUROLOGY

## 2022-12-07 PROCEDURE — 3074F SYST BP LT 130 MM HG: CPT | Performed by: PSYCHIATRY & NEUROLOGY

## 2022-12-07 PROCEDURE — 1036F TOBACCO NON-USER: CPT | Performed by: PSYCHIATRY & NEUROLOGY

## 2022-12-07 PROCEDURE — G8417 CALC BMI ABV UP PARAM F/U: HCPCS | Performed by: PSYCHIATRY & NEUROLOGY

## 2022-12-07 PROCEDURE — G8427 DOCREV CUR MEDS BY ELIG CLIN: HCPCS | Performed by: PSYCHIATRY & NEUROLOGY

## 2022-12-07 PROCEDURE — 3078F DIAST BP <80 MM HG: CPT | Performed by: PSYCHIATRY & NEUROLOGY

## 2022-12-07 PROCEDURE — G8484 FLU IMMUNIZE NO ADMIN: HCPCS | Performed by: PSYCHIATRY & NEUROLOGY

## 2022-12-07 ASSESSMENT — ENCOUNTER SYMPTOMS
ABDOMINAL PAIN: 0
SORE THROAT: 0
COUGH: 0
EYE ITCHING: 0
WHEEZING: 0
SINUS PRESSURE: 0
EYE PAIN: 0
NAUSEA: 0
CONSTIPATION: 0
TROUBLE SWALLOWING: 0
SWOLLEN GLANDS: 0
CHANGE IN BOWEL HABIT: 0
ABDOMINAL DISTENTION: 0
BOWEL INCONTINENCE: 0
DIARRHEA: 0
APNEA: 0
FACIAL SWELLING: 0
VOMITING: 0
EYE DISCHARGE: 0
SHORTNESS OF BREATH: 0
VOICE CHANGE: 0
BLOOD IN STOOL: 0
BACK PAIN: 1
CHEST TIGHTNESS: 0
PHOTOPHOBIA: 0
EYE REDNESS: 0
COLOR CHANGE: 0
CHOKING: 0
VISUAL CHANGE: 1

## 2022-12-07 NOTE — PROGRESS NOTES
[facility]  Neurology  [address] [place] [address]  MQKOX:[phone]   Fax: [phone]        SUBJECTIVE:     PATIENT ID:  Durga Gerber is a   LEFT     HANDED 74 y.o. male. Cerebrovascular Accident  This is a chronic problem. Episode onset: SEPT. 2016   The problem has been unchanged. Associated symptoms include numbness and a visual change. Pertinent negatives include no abdominal pain, anorexia, arthralgias, change in bowel habit, chest pain, chills, congestion, coughing, diaphoresis, fatigue, fever, headaches, joint swelling, myalgias, nausea, neck pain, rash, sore throat, swollen glands, urinary symptoms, vertigo, vomiting or weakness. Nothing aggravates the symptoms. Treatments tried: ASA   AND PLAVIX   The treatment provided moderate relief. Neurologic Problem  The patient's primary symptoms include clumsiness, focal sensory loss, a loss of balance, memory loss and a visual change. The patient's pertinent negatives include no altered mental status, focal weakness, near-syncope, slurred speech, syncope or weakness. This is a chronic problem. Episode onset: MORE  THAN   2-3  YEARS. The neurological problem developed insidiously. There was lower extremity, left-sided and right-sided focality noted. Associated symptoms include back pain. Pertinent negatives include no abdominal pain, auditory change, aura, bladder incontinence, bowel incontinence, chest pain, confusion, diaphoresis, dizziness, fatigue, fever, headaches, light-headedness, nausea, neck pain, palpitations, shortness of breath, vertigo or vomiting. Past treatments include medication, bed rest and sleep. The treatment provided mild relief. His past medical history is significant for a CVA and dementia. There is no history of a bleeding disorder, a clotting disorder, head trauma, liver disease, mood changes or seizures.          History obtained from  The patient     And   HIS  WIFE       and other  available   medical  records   were  Also  reviewed. The  Duration,  Quality,  Severity,  Location,  Timing,  Context,  Modifying  Factors   Of   The   Chief   Complaint       And  Present  Illness  Was   Reviewed   In   Chronological   [place]. PATIENT'S  MAIN  CONCERNS INCLUDE :                     1)     H/O    OLD    STROKE  IN     2016           WITH                                       HOSPITALIZATION  IN  [place]                             2)    PATIENT  WAS    REPORTED  TO    UNREPONSIVE                                       DUE  TO  CARDIAC    PROBLEMS                                 3 )    MRI  BRAIN   IN    SEPT. 2016     SHOWED   :                               A)   BILATERAL  TEMPORAL  INFARCTS                                 B)   OLD  LEFT  OCCIPITAL INFARCT                               4)      H/O   POST  STROKE     MEMORY  AND  COGNITIVE  PROBLEMS                                              SINCE     2016                                    -      WAS      ON  ARICEPT    5  MG  DAILY                                 5)      RESIDUAL   RIGHT     VISUAL   FIELD  DEFECTS                                              DUE  TO  PREVIOUS  STROKE                                   6)     CURRENTLY  ON   ASA  AND PLAVIX                                         PROPHYLACTIC ALLY           AND                                    TOLERATING  THE  SAME.                                7)     MULTIPLE  CO  MORBID  MEDICAL  CONDITIONS                                 BEING  FOLLOWED  BY  HIS  PCP                                8)     DIABETIC  PERIPHERAL  POLYNEUROPATHY                                               MOSTLY   BOTH  LOWER  EXTREMITIES                                   9)   H/O   CHRONIC  LUMBAR  PAIN                            PREVIOUS  H/O   LUMBAR  DISC   SURGERIES                                    10)     MILD    BALANCE PROBLEMS -     STABLE                              11)    H/O  CHRONIC    MILD  ANXIETY. PATIENT  DENIES  ANY  DEPRESSIVE  SYMPTOMS                                12)     PREVIOUS  H/O   SMOKING. PATIENT  QUIT  SMOKING  SINCE     SEPT. 2016                                                                   13)        HAD    NEURO  DIAGNOSTIC  EVALUATIONS  IN  July 2019                                        A)   CT   HEAD  SHOWED   NO  ACUTE  PATHOLOGY                                           REMOTE      STROKES   -   REMAIN    UN  CHANGED                                        B)    CAROTID  DOPPLER,  EEG  ,  LABS  SHOWED                                               NO  SIGNIFICANT    ABNORMALITIES                                                             14)          AS   DISCUSSED    WITH  PATIENT   AND  HIS  WIFE                                PATIENT     STARTED    ON       ARICEPT    10  MG  PO   LAMB   WITH  SUPPER                                       IN   SEPT. 2019                                     PATIENT  TOLERATING  THE  SAME  AND  GETTING  BENEFIT. EXPECTATIONS,   GOALS   AND  SIDE  EFFECTS  MEDICATION    WERE                                 REVIEWED     AND   DISCUSSED    IN    DETAIL.                               15)      H / O     SYNCOPE     IN     FEB. 2021                              PATIENT   WOKE    UP    ON     FLOOR      AS   NOTICED  BY   HIS  WIFE                                   D /  D  INCLUDE                                              CARDIAC    ARRHYTHMIA,    VASOVAGAL                                                 SEIZURES,   MEDICATION    EFFECTS                                                     AND   OTHER    ETIOLOGIES                             16)     PATIENT  HAD     ER   VISIT      AND    EVALUATIONS CT   HEAD  ON    02/01/2021       SHOWED                                   A)      NO   ACUTE  PATHOLOGY                                    B)    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                                               AND  RIGHT   TEMPORAL  LOBES                                      C)    CHRONIC  CEREBRAL ISCHEMIA                         17)      CAROTID  DOPPLER  AND   EEG  IN  April 2021                                  SHOWED    NO    SIGNIFICANT  ABNORMALITIES                                       18)     PATIENT   HAD      CARDIOLOGY     EVALUATIONS                             PATIENT    SYMPTOMS      IMPROVED    AFTER    STOPPING                                 HYDRALAZINE                            19)                NO   H/O    RECURRENCE OF  TIA  /   STROKE                                          NO   RECURRENCE    OF     SYNCOPE.                                             MEMORY   PROBLEMS     ARE   STABLE                                                                       PATIENT  AND  HIS  WIFE   DENIED                                ANY    NEW   NEUROLOGICAL    CONCERNS,  .                                    20)     VARIOUS  RISK   FACTORS   WERE  REVIEWED   AND   DISCUSSED. PATIENT   HAS  MULTIPLE   MEDICAL, NEUROLOGICAL      PROBLEMS                     PATIENT'S   MANAGEMENT  IS  CHALLENGING.                                     PRECIPITATING  FACTORS: including  fever/infection, exertion/relaxation, position change,                        stress, weather change,                        medications/alcohol, time of day/darkness/light  Are  absent                                                                MODIFYING  FACTORS:  fever/infection, exertion/relaxation, position change, stress, weather change,                        medications/alcohol, time of day/darkness/light  Are  absent             Patient   Indicates   The  Presence And  The  Absence  Of  The  Following    Associated  And          Additional  Neurological    Symptoms:                                Balance  And coordination   problems  present           Gait problems     absent            Headaches      absent              Migraines           absent           Memory problemspresent              Confusion        absent            Paresthesia   numbness          present           Seizures  And  Starring  Episodes           absent           Syncope,  Near  syncopal episodes         absent           Speech   problems           absent             Swallowing   Problems      absent            Dizziness,  Light headedness           absent              Vertigo        absent             Generalized   Weakness    absent              focal  Weakness     absent             Tremors         absent              Sleep  Problems     absent             History  Of   Recent  Head  Injury     absent             History  Of   Recent  TIA     absent             History  Of   Recent    Stroke     absent             Neck  Pain   and   Neck muscle  Spasms  absent               Radiating  down   And   Weakness           absent            Lower back   Pain  And     Spasms  present              Radiating    Down   And   Weakness          present                H/O    FALLS        absent               History  Of   Visual  Symptoms   PRESENT                    Associated   Diplopia       absent                                               Also   Additional   Symptoms   Present    As  Documented    In   The   detailed                  Review  Of  Systems   And    Please   Refer   To    Them for   Additional    Information. Any components  That are either  Unobtainable  Or  Limited  In   HPI, ROS  And/or PFSH   Are                   Due   ToPatient's  Medical  Problems,  Clinical  Condition   and/or lack of                                other    Alternate   resources.           RECORDS REVIEWED:    historical medical records       INFORMATION   REVIEWED:     MEDICAL   HISTORY,SURGICAL   HISTORY,     MEDICATIONS   LIST,   ALLERGIES AND  DRUG  INTOLERANCES,       FAMILY   HISTORY,  SOCIAL  HISTORY,      PROBLEM  LIST   FOR  PATIENT  CARE   COORDINATION      Past Medical History:   Diagnosis Date    CVA (cerebral vascular accident) ([facility]     Diabetes mellitus ([facility] [facility]     History of back surgery     Measles     Osteoarthritis          Past Surgical History:   Procedure Laterality Date    BACK SURGERY      HERNIA REPAIR           Current Outpatient Medications   Medication Sig Dispense Refill    tadalafil (CIALIS) 5 MG tablet Take 1 tablet by mouth daily 30 tablet 11    amLODIPine (NORVASC) 10 MG tablet TAKE 1 TABLET DAILY 90 tablet 3    atorvastatin (LIPITOR) 40 MG tablet TAKE 1 TABLET DAILY 90 tablet 3    cyclobenzaprine (FLEXERIL) 10 MG tablet TAKE 1 TABLET THREE TIMES A  tablet 3    donepezil (ARICEPT) 10 MG tablet TAKE 1 TABLET DAILY WITH SUPPER 90 tablet 1    metFORMIN (GLUCOPHAGE) 500 MG tablet TAKE 1 TABLET TWICE A  tablet 3    lisinopril (PRINIVIL;ZESTRIL) 20 MG tablet TAKE 1 TABLET DAILY 90 tablet 3    clopidogrel (PLAVIX) 75 MG tablet TAKE 1 TABLET DAILY 90 tablet 3    Handicap Placard MISC by Does not apply route Expires: 3/25/2027 1 each 0    aspirin 81 MG tablet Take 81 mg by mouth daily      vitamin B-12 (CYANOCOBALAMIN) 1000 MCG tablet Take 1,000 mcg by mouth daily      Cholecalciferol (VITAMIN D3) 5000 units TABS Take 5,000 Units by mouth daily      vitamin E 400 UNIT capsule Take 400 Units by mouth daily      magnesium oxide (MAG-OX) 400 MG tablet Take 400 mg by mouth daily       No current facility-administered medications for this visit.          No Known Allergies      Family History   Problem Relation Age of Onset    High Blood Pressure Mother     Cataracts Mother     Diabetes Maternal Grandmother          Social History     Socioeconomic History    Marital status:      Spouse name: Not on file    Number of children: Not on file    Years of education: Not on file    Highest education level: Not on file   Occupational History    Not on file   Tobacco Use    Smoking status: Former     Packs/day: 2.00     Years: 30.00     Pack years: 60.00     Types: Cigarettes     Start date: 14     Quit date: 2016     Years since quittin.5    Smokeless tobacco: Never   Vaping Use    Vaping Use: Never used   Substance and Sexual Activity    Alcohol use: Not Currently    Drug use: Never    Sexual activity: Not on file   Other Topics Concern    Not on file   Social History Narrative    Not on file     Social Determinants of Health     Financial Resource Strain: Low Risk     Difficulty of Paying Living Expenses: Not hard at all   Food Insecurity: No Food Insecurity    Worried About Running Out of Food in the Last Year: Never true    [address] in the Last Year: Never true   Transportation Needs: Not on file   Physical Activity: Insufficiently Active    Days of Exercise per Week: 1 day    Minutes of Exercise per Session: 60 min   Stress: Not on file   Social Connections: Not on file   Intimate Partner Violence: Not on file   Housing Stability: Not on file       Vitals:    22 1442   BP: 126/82   Pulse: 98   SpO2: 94%         Wt Readings from Last 3 Encounters:   22 228 lb (103.4 kg)   10/24/22 229 lb (103.9 kg)   22 230 lb (104.3 kg)         BP Readings from Last 3 Encounters:   22 126/82   10/24/22 132/88   10/10/22 138/84       Chemistries  Lab Results   Component Value Date/Time     2022 11:36 AM    K 4.9 2022 11:36 AM     2022 11:36 AM    CO2 26 2022 11:36 AM    BUN 15 2022 11:36 AM    CREATININE 0.98 2022 11:36 AM    CALCIUM 9.6 2022 11:36 AM    PROT 6.9 2022 11:36 AM    LABALBU 4.6 2022 11:36 AM    BILITOT 0.53 2022 11:36 AM    ALKPHOS 78 2022 11:36 AM    AST 19 2022 11:36 AM    ALT 26 08/05/2022 11:36 AM     Lab Results   Component Value Date/Time    ALKPHOS 78 08/05/2022 11:36 AM    ALT 26 08/05/2022 11:36 AM    AST 19 08/05/2022 11:36 AM    PROT 6.9 08/05/2022 11:36 AM    BILITOT 0.53 08/05/2022 11:36 AM    LABALBU 4.6 08/05/2022 11:36 AM     Lab Results   Component Value Date/Time    BUN 15 08/05/2022 11:36 AM    CREATININE 0.98 08/05/2022 11:36 AM     Lab Results   Component Value Date/Time    CALCIUM 9.6 08/05/2022 11:36 AM    MG 1.8 08/05/2022 11:36 AM     Lab Results   Component Value Date/Time    AST 19 08/05/2022 11:36 AM    ALT 26 08/05/2022 11:36 AM     Lab Results   Component Value Date/Time    [id number] 08/05/2022 11:36 AM           Review of Systems   Constitutional:  Negative for appetite change, chills, diaphoresis, fatigue, fever and unexpected weight change. HENT:  Negative for congestion, dental problem, drooling, ear discharge, ear pain, facial swelling, hearing loss, mouth sores, nosebleeds, postnasal drip, sinus pressure, sore throat, tinnitus, trouble swallowing and voice change. Eyes:  Positive for visual disturbance. Negative for photophobia, pain, discharge, redness and itching. Respiratory:  Negative for apnea, cough, choking, chest tightness, shortness of breath and wheezing. Cardiovascular:  Negative for chest pain, palpitations, leg swelling and near-syncope. Gastrointestinal:  Negative for abdominal distention, abdominal pain, anorexia, blood in stool, bowel incontinence, change in bowel habit, constipation, diarrhea, nausea and vomiting. Endocrine: Negative for cold intolerance, heat intolerance, polydipsia, polyphagia and polyuria. Genitourinary:  Negative for bladder incontinence. Musculoskeletal:  Positive for back pain. Negative for arthralgias, gait problem, joint swelling, myalgias, neck pain and neck stiffness. Skin:  Negative for color change, pallor, rash and wound.    Allergic/Immunologic: Negative for environmental allergies, food allergies and immunocompromised state. Neurological:  Positive for numbness and loss of balance. Negative for dizziness, vertigo, tremors, focal weakness, seizures, syncope, facial asymmetry, speech difficulty, weakness, light-headedness and headaches. Hematological:  Negative for adenopathy. Does not bruise/bleed easily. Psychiatric/Behavioral:  Positive for decreased concentration and memory loss. Negative for agitation, behavioral problems, confusion, dysphoric mood, hallucinations, self-injury, sleep disturbance and suicidal ideas. The patient is nervous/anxious. The patient is not hyperactive. OBJECTIVE:    Physical Exam  Constitutional:       Appearance: He is well-developed. HENT:      Head: Normocephalic and atraumatic. No raccoon eyes or Adame's sign. Right Ear: External ear normal.      Left Ear: External ear normal.      Nose: Nose normal.   Eyes:      Conjunctiva/sclera: Conjunctivae normal.   Neck:      Thyroid: No thyroid mass or thyromegaly. Vascular: No carotid bruit. Trachea: No tracheal deviation. Meningeal: Brudzinski's sign and Kernig's sign absent. Cardiovascular:      Rate and Rhythm: Normal rate and regular rhythm. Pulmonary:      Effort: Pulmonary effort is normal.   Musculoskeletal:         General: No tenderness. Cervical back: Normal range of motion and neck supple. No rigidity. No muscular tenderness. Normal range of motion. Skin:     General: Skin is warm. Coloration: Skin is not pale. Findings: No erythema or rash. Nails: There is no clubbing. Psychiatric:         Attention and Perception: He is attentive. Mood and Affect: Mood is anxious. Mood is not depressed. Affect is not labile, blunt, angry or inappropriate. Speech: Speech is delayed. Behavior: Behavior is slowed. Behavior is not agitated, aggressive, withdrawn, hyperactive or combative. Behavior is cooperative. limits               No  Drift. No  Atrophy               Rapid   alternating  And  repetitions  Movements  within   normal limits               Muscle  Tone  In upper  And  Lower  Extremities  normal                No rigidity. No  Spasticity. Bradykinesia   absent               No  Asterixis. Sustention  Tremor , Resting   Tremor   absent                    No   other  Abnormal  Movements noted           D) SENSORY :               Light   touch, pinprick,   position  And  Vibration  DECREASED                             BELOW  BOTH  KNEE  LEVELS        E) REFLEXES:                   Deep  Tendon  Reflexes     DECREASED                    No  pathological  Reflexes  Bilaterally. F) COORDINATION  AND  GAIT :                                Station and  Gait    SLOW                               Romberg 's test   POSITIVE                            Ataxia negative        ASSESSMENT:        Patient Active Problem List   Diagnosis    Low back pain    Gastroesophageal reflux disease without esophagitis    Memory loss    Hyperlipidemia    Essential hypertension    Right homonymous hemianopsia    Hypertensive retinopathy of both eyes    Combined forms of age-related cataract of both eyes    History of back surgery    Diabetes mellitus ([address]    Occipital cerebral infarction ([address]    Infarction of temporal lobe ([address]    Dementia without behavioral disturbance ([address]    Cerebrovascular accident (CVA) due to thrombosis of precerebral artery ([address]    Syncope    H/O syncope    Type 2 diabetes mellitus with neurologic complication, without long-term current use of insulin ([address]         ULTRASOUND EVALUATION OF THE CAROTID ARTERIES       3/31/2021       COMPARISON:   July 12, 2019.        HISTORY:   ORDERING SYSTEM PROVIDED HISTORY: Dementia without behavioral disturbance,   unspecified dementia type ([address]       FINDINGS:   Gray scale and color and spectral doppler ultrasound examination of the   carotid and vertebral arteries was performed. The bilateral common carotid, internal carotid, and external carotid arteries   are patent. There is no appreciable carotid plaque formation or intimal   medial thickening. The arterial waveforms and peak systolic velocity measurements within the   common carotid, internal carotid, and external carotid arteries are within   normal limits bilaterally. The internal carotid to common carotid ratios are within normal limits   bilaterally, measuring 1.30 on the right and 0.50 on the left. The vertebral arteries are patent and demonstrate appropriate antegrade flow   direction. Impression   Normal carotid ultrasound study. CT OF THE HEAD WITHOUT CONTRAST  2/1/2021 1:24 pm       TECHNIQUE:   CT of the head was performed without the administration of intravenous   contrast. Dose modulation, iterative reconstruction, and/or weight based   adjustment of the mA/kV was utilized to reduce the radiation dose to as low   as reasonably achievable. COMPARISON:   Head CT of 07/12/2019       HISTORY:   ORDERING SYSTEM PROVIDED HISTORY: syncope   TECHNOLOGIST PROVIDED HISTORY:       syncope   Reason for Exam: Syncope today with fall,abrasion to posterior skull. History   of old  stroke and memory loss. Acuity: Acute   Type of Exam: Initial       FINDINGS:   There is no acute  intracranial hemorrhage or intracranial mass lesion. No   mass effect, midline shift or extra-axial collection is noted. There are moderate nonspecific foci of periventricular and subcortical   cerebral white matter hypodensity, most likely representing chronic   microangiopathic disease in this age group. There are chronic   encephalomalacia changes within the left occipital lobe and posterior aspect   of right temporal lobe. The brain parenchyma is otherwise normal. The   cerebellar tonsils are in normal position. The ventricles, sulci, and cisterns are mildly prominent suggestive of mild   generalized volume loss. The globes and orbits are within normal limits. Calcified sebaceous cyst   within the subcutaneous tissues of central occipital region. The visualized   extracranial structures including paranasal sinuses and mastoid air cells are   unremarkable. No fracture is identified. Impression       Stable study. No significant change since prior head CT of 2019. Chronic encephalomalacia changes within the left occipital lobe and posterior   aspect of right temporal lobe. No evidence for acute intracranial hemorrhage or intracranial mass lesion. Moderate chronic microangiopathic ischemic disease. Mild generalized volume loss. CT OF THE HEAD WITHOUT CONTRAST  7/12/2019 4:17 pm       TECHNIQUE:   CT of the head was performed without the administration of intravenous   contrast. Dose modulation, iterative reconstruction, and/or weight based   adjustment of the mA/kV was utilized to reduce the radiation dose to as low   as reasonably achievable. COMPARISON:   None. HISTORY:   ORDERING SYSTEM PROVIDED HISTORY: Right homonymous hemianopsia   TECHNOLOGIST PROVIDED HISTORY:   history of CVA   Reason for Exam: Balance and memory loss with vision changes. History of CVA   1 year ago   Acuity: Chronic   Type of Exam: Initial       FINDINGS:   BRAIN/VENTRICLES: There is no acute intracranial hemorrhage, mass effect or   midline shift. No abnormal extra-axial fluid collection. The gray-white   differentiation is maintained without evidence of an acute infarct. There is   no evidence of hydrocephalus. Old ischemic strokes with resultant   encephalomalacia in the left occipital lobe and posterior aspect of right   temporal lobe. ORBITS: The visualized portion of the orbits demonstrate no acute abnormality.        SINUSES: The visualized paranasal sinuses and mastoid air cells demonstrate   no acute abnormality. SOFT TISSUES/SKULL:  No acute abnormality of the visualized skull or soft   tissues. Small sebaceous cyst with calcifications in the subcutaneous fat of   the posterior parietal scalp along the midline. Impression   No acute intracranial abnormality. Old ischemic strokes with resultant encephalomalacia in the left occipital   lobe and posterior aspect of right temporal lobe. History:  Stroke    Exam/Technique:  Multiplanar multisequence images were obtained without the use of contrast.    Comparison:  Cerebral MRI from September 29, 2016    Findings: There is no evidence of recent infarcts on diffusion-weighted images. There is an old left occipital cortical infarct medially, and  smaller areas of old infarction in the mesial temporal cortex bilaterally. These were recent time of the previous MRI There are also scattered punctate areas of increased T2 signal in the   deep supratentorial white matter that are consistent with chronic small vessel ischemic change. This also one 3 mm diameter fluid intensity area in the left cerebellar white matter that is consistent with an old lacunar infarct. Ventricles and CSF spaces are bilaterally symmetric and within normal limits. Gray-white matter differentiation is normal throughout. Incidental note is made of an apparent sedation cyst along the occipital portion of the scalp. There is some fluid content in mastoid air cells bilaterally. Otherwise, no gross abnormalities are displayed in the bones or soft tissues of the skull base. IMPRESSION:  Old left occipital and bilateral temporal infarcts, and additional chronic abnormalities. No evidence of recent infarcts or other additional intracranial abnormalities. Finalized by Cam Castro MD on 12/20/2016 2:23 PM      ULTRASOUND EVALUATION OF THE CAROTID ARTERIES       7/12/2019       COMPARISON:   None. HISTORY:   ORDERING SYSTEM PROVIDED HISTORY: Right homonymous hemianopsia   TECHNOLOGIST PROVIDED HISTORY:   history of CVA   Reason for Exam: right homonymous hemianopsia   Acuity: Chronic   Type of Exam: Subsequent/Follow-up       FINDINGS:       RIGHT:       The right common carotid artery demonstrates peak systolic velocities of 65,   61 cm/sec in the proximal and distal segments respectively. The right internal carotid artery demonstrates the systolic velocities of 74,   60, 38 cm/sec in the proximal, mid and distal segments respectively. The external carotid artery is patent. The vertebral artery demonstrates   normal antegrade flow. No evidence of focal atherosclerotic plaque. ICA/CCA ratio of 1.1. LEFT:       The left common carotid artery demonstrates peak systolic velocities of 67,   70 cm/sec in the proximal and distal segments respectively. The left internal carotid artery demonstrates the systolic velocities of 43,   50, 50 cm/sec in the proximal, mid and distal segments respectively. The external carotid artery is patent. The vertebral artery demonstrates   normal antegrade flow. A focus of mild smooth plaque is noted at the origin of the internal carotid   artery. ICA/CCA ratio of 0.7. Impression   The right internal carotid artery demonstrates 0-50% stenosis. The left internal carotid artery demonstrates 0-50% stenosis. Bilateral vertebral arteries are patent with flow in the normal direction. VISITING DIAGNOSIS:        ICD-10-CM    1. Cerebrovascular accident (CVA) due to thrombosis of precerebral artery ([facility] [address]  I63.00       2. Type 2 diabetes mellitus with diabetic polyneuropathy, without long-term current use of insulin ([facility])  E11.42       3. Infarction of temporal lobe ([facility])  I63.89       4. H/O syncope  Z87.898       5. Occipital cerebral infarction ([facility])  I63.9       6.  History of back surgery  Z98.890 7. Memory loss  R41.3       8. Right homonymous hemianopsia  H53.461       9. Essential hypertension  I10       10. Hypertensive retinopathy of both eyes  H35.033                  CONCERNS   &   INCREASED   RISK   FOR         * TIA,  CEREBRO  VASCULAR  ISCHEMIA,STROKE      *   COGNITIVE  &   MEMORY PROBLEMS  AND  DEMENTIAS         *   PERIPHERAL  NEUROPATHY,        *   BALANCE PROBLMES                 VARIOUS  RISK   FACTORS   WERE  REVIEWED   AND   DISCUSSED. *  PATIENT   HAS  MULTIPLE   MEDICAL, NEUROLOGICAL      PROBLEMS         PATIENT'S   MANAGEMENT  IS  CHALLENGING. PLAN:                         Glenardanita Clap  Of  The  Diagnoses,  The  Management & Treatment  Options            AND    Care  plan  Were          Reviewed and   Discussed   With  patient. * Goals  And  Expectations  Of  The  Therapy  Discussed   And  Reviewed. *   Benefits   And   Side  Effect  Profile  Of  Medication/s   Were   Discussed                * Need   For  Further   Follow up For  The  Various  Problems Were  discussed. * Results  Of  The  Previous  Diagnostic tests were reviewed and  discussed                 Medical  Decision  Making  Was  Made  Based on the   Complexity  Of  Above  Mentioned  Diagnoses,    Data reviewed             And    Risk  Of  Significant   Co morbidities and   complicating   Factors. Medical  Decision  Was   High    Complexity   Due   To  The  Patient's  Multiple  Symptoms,      Advancing   Disease,  Complex  Treatment  Regimen,  Multiple medications           and   Risk  Of   Side  Effects,  Difficulty  In  Medication  Management  And  Diagnostic  Challenges       In  View  Of  The  Associated   Co  Morbid  Conditions   And  Problems. * FALL   PRECAUTIONS.       THESE  REVIEWED   AND  DISCUSSED      *   ABSOLUTELY   NO  DRIVING        *   BE  CAREFUL  WITH  ACTIVITIES            *   AVOID   BACK  STRAINING ACTIVITIES          *   ADEQUATE   FLUIDINTAKE   AND  ELECTROLYTE  BALANCE         * KEEP  DAIRY  OF   THE  NEUROLOGICAL  SYMPTOMS        RECORDING THE    DURATION  AND  FREQUENCY. *  AVOID    CONDITIONS  AND  FACTORS   THAT  MAKE                  NEUROLOGICAL  SYMPTOMS  WORSE.           *TO  MAINTAIN  REGULAR  SLEEP  WAKE  CYCLES. *   TO  HAVE  ADEQUATE  REST  AND   SLEEP    HOURS.          *    AVOID  ANY USAGE OF    TOBACCO,              EXCESSIVE  ALCOHOL  AND   ILLEGAL   SUBSTANCES          *  CONTINUE   MEDICATIONS    PRESCRIBED   BY NEUROLOGIST    AS    RECOMMENDED       *   Compliance   With  Medications   And  Instructions          * CURRENTLY    TOLERATING  THE  PRESCRIBED   MEDICATIONS. WITHOUT  ANY  SIGNIFICANT  SIDE  EFFECTS   &  GETTING BENEFIT. *    Antiplatelet  therapy    As   Recommended  Was   Discussed          *    Prophylactic  Use   Of     Vitamin   B   Complex,  Folic  Acid,    Vitamin  B12    Multivitamin,       Calcium  With  magnesium  And  Vit D    Supplementations   Over  The  Counter  Discussed               *FOOT  CARE, DAILY  INSPECTION  OF  FEET   AND         PERIODIC  PODIATRY EVALUATIONS . *  PATIENT  IS  ALSO   COUNSELED   TO  KEEP    ACTIVITIES:         A)   SIMPLE      B)  ORGANIZED      C)  WRITEDOWN                   *  EVALUATIONS  AND  FOLLOW UP:                                      *CARDIOLOGY              *   OPTHALMOLOGY                                           *         CURRENTLY  ON   ASA  AND PLAVIX                                         PROPHYLACTIC ALLY           AND                                    TOLERATING  THE  SAME. *    CAROTID  DOPPLER  AND   EEG  IN  April 2021                                  SHOWED    NO    SIGNIFICANT  ABNORMALITIES                 *       NO   H/O    RECURRENCE OF  TIA  /   STROKE                                          NO   RECURRENCE    OF     SYNCOPE. MEMORY   PROBLEMS     ARE   STABLE                                                             PATIENT  AND  HIS  WIFE   DENIED  ANY    NEW   NEUROLOGICAL    CONCERNS,  . VARIOUS  RISK   FACTORS   WERE  REVIEWED   AND   DISCUSSED. *PATIENT   TO  FOLLOW  UP  WITH   PRIMARY  CARE         OTHER  CONSULTANTS  AS  BEFORE. *  Maintain   Healthy  Life Style    With   Periodic  Monitoring  Of      Any  Medical  Conditions  Including   Elevated  Blood  Pressure,  Lipid  Profile,     Blood  Sugar levels  AndHeart  Disease. *   Period   Screening  For  Cancers  Involving  Breast,  Colon,    lungs  And  Other  Organs  As  Applicable,  In consultation   With  Your  Primary Care Providers. *Second  Neurological  Opinion  And  Evaluations  In  [facility]  Setting  If  Patient  Is  Interested. * Please   Contact   Neurology  Clinic   Early   If   Are  Any  New  Neurological   And  Any neurological  Concerns. *  If  The  Patient remains  Neurologically  Stable   Return   To  [facility] Neurology Department   IN       6           MONTHS  TIME   FOR  FURTHER              FOLLOW UP.                       *   The  Neurological   Findings,  Possible  Diagnosis,  Differential diagnoses                    And  Options  For    Further   Investigations                   And  management   Are  Discussed  Comprehensively. Medications   And  Prescription   Risks  And  Side effects  Are   Also  Discussed. *  If   There is  Any  Significant  Worsening   Of  Current  Symptoms  And  Or                  If patient  Develops   Any additional  New  NeurologicalSymptoms                  Or  Significant  Concerns   Should  Call  911 or  Go  To  Emergency  Department                     For  Further  Immediate  Evaluation. The   Above  Were  Reviewed  With  Patient     AND  HIS  WIFE      and                       questions  Answered  In  Detail. More   Than  50% of face  To face Time   Was  Spent  On  Counseling                    And   Coordination  Of  Care   Of   Patient's  multiple   Neurological  Problems                         And   Comorbid  Medical   Conditions. Electronically signed by Shanell Hernandez MD.,  Pepe Hampton      Board Certified in  Neurology &  In  [facility] ([facility])      DISCLAIMER:   Although every effort was made to ensure the accuracy of this  electronictranscription, some errors in transcription may have occurred. GENERAL PATIENT INSTRUCTIONS:     A Healthy Lifestyle: Care Instructions  Your Care Instructions  A healthy lifestyle can help you feel good, stay at ahealthy weight, and have plenty of energy for both work and play. A healthy lifestyle is something you can share with your whole family. A healthy lifestyle also can lower your risk for serious health problems, such ashigh blood pressure, heart disease, and diabetes. You can follow a few steps listed below to improve your health and the health of your family. Follow-up careis a key part of your treatment and safety. Be sure to make and go to all appointments, and call your doctor if you are having problems. Its also a good idea to know your test results and keep a list of the medicines you take. How can you care for yourself at home? Do not eat too much sugar, fat, or fast foods. You can still have dessert and treats nowand then. The goal is moderation. Start small to improve your eating habits. Pay attention to portion sizes, drink less juice and soda pop, and eat more fruits and vegetables. Eat a healthy amount of food. A 3-ounce serving of meat, for example, is about the size of a deck of cards.  Fill the rest of your plate with vegetables and whole grains. Limit theamount of soda and sports drinks you have every day. Drink more water when you are thirsty. Eat at least 5 servings of fruits and vegetables every day. It may seem like a lot, but it is not hard to reach this goal. Aserving or helping is 1 piece of fruit, 1 cup of vegetables, or 2 cups of leafy, raw vegetables. Have an apple or some carrot sticks as an afternoon snack instead of a candy bar. Try to have fruits and/or vegetables at everymeal.  Make exercise part of your daily routine. You may want to start with simple activities, such as walking, bicycling, or slow swimming. Try cesar active 30 to 60 minutes every day. You do not need to do all 30 to 60 minutes all at once. For example, you can exercise 3 times a day for 10 or 20 minutes. Moderate exercise is safe for most people, but it is always agood idea to talk to your doctor before starting an exercise program.  Keep moving. Stuart Minium the lawn, work in the garden, or [employer]. Take the stairs instead of the elevator at work. If you smoke, quit. Peoplewho smoke have an increased risk for heart attack, stroke, cancer, and other lung illnesses. Quitting is hard, but there are ways to boost your chance of quitting tobacco for good. Use nicotine gum, patches, or lozenges. Ask your doctor about stop-smoking programs and medicines. Keep trying. In addition to reducing your risk of diseases in the future, you will notice some benefits soon after you stop using tobacco. If you have shortness of breath or asthma symptoms, they will likely getbetter within a few weeks after you quit. Limit how much alcohol you drink. Moderate amounts of alcohol (up to 2 drinks a day for men, 1drink a day for women) are okay. But drinking too much can lead to liver problems, high blood pressure, and other health problems. health  If you have a family, there are many things you can do together to improve your health.   Eat meals together as a family as often as possible. Eat healthy foods. This includes fruits, vegetables, lean meats and dairy, and whole grains. Include your family in your fitness plan. Most peoplethink of activities such as jogging or tennis as the way to fitness, but there are many ways you and your family can be more active. Anything that makes you breathe hard and gets your heart pumping is exercise. Here are sometips:  Walk to do errands or to take your child to school or the bus. Go for a family bike ride after dinner instead of watching TV. Where can you learn more? Go toOMNIlife sciencetps://[employer]iously. org and sign in to your [employer] account. Enter J829 in the Search HealthInformation box to learn more about \"A Healthy Lifestyle: Care Instructions. \"     If you do not have anaccount, please click on the \"Sign Up Now\" link. Current as of: July 26, 2016  Content Version: 11.2  © 0643-2507 [employer] Care instructions adapted under license by [facility] ([facility]). If you have questions about a medical condition or this instruction, always ask your healthcare professional. [employer] disclaims any warranty or liability for your use of this information.

## 2023-02-15 DIAGNOSIS — R41.3 MEMORY LOSS: ICD-10-CM

## 2023-02-16 RX ORDER — DONEPEZIL HYDROCHLORIDE 10 MG/1
TABLET, FILM COATED ORAL
Qty: 90 TABLET | Refills: 3 | Status: SHIPPED | OUTPATIENT
Start: 2023-02-16

## 2023-06-06 ENCOUNTER — TELEPHONE (OUTPATIENT)
Dept: ONCOLOGY | Age: 68
End: 2023-06-06

## 2023-06-06 DIAGNOSIS — Z87.891 PERSONAL HISTORY OF NICOTINE DEPENDENCE: Primary | ICD-10-CM

## 2023-06-06 NOTE — TELEPHONE ENCOUNTER
Our records indicate that your patient is coming due for their annual lung cancer screening follow up testing. For your convenience, we have pended the order for the scan for you. If you do not agree with the need for the test, please cancel the order and let us know. Sincerely,    [address] [place] [address] Screening Program    Auto printed reminder letter sent to patient.

## 2023-07-06 ENCOUNTER — OFFICE VISIT (OUTPATIENT)
Dept: NEUROLOGY | Age: 68
End: 2023-07-06
Payer: MEDICARE

## 2023-07-06 VITALS
SYSTOLIC BLOOD PRESSURE: 126 MMHG | HEART RATE: 88 BPM | BODY MASS INDEX: 31.92 KG/M2 | HEIGHT: 70 IN | WEIGHT: 223 LBS | DIASTOLIC BLOOD PRESSURE: 90 MMHG

## 2023-07-06 DIAGNOSIS — I63.89 INFARCTION OF TEMPORAL LOBE (HCC): ICD-10-CM

## 2023-07-06 DIAGNOSIS — I63.9 CEREBROVASCULAR ACCIDENT (CVA), UNSPECIFIED MECHANISM (HCC): ICD-10-CM

## 2023-07-06 DIAGNOSIS — H53.461 RIGHT HOMONYMOUS HEMIANOPSIA: ICD-10-CM

## 2023-07-06 DIAGNOSIS — Z87.898 H/O SYNCOPE: ICD-10-CM

## 2023-07-06 DIAGNOSIS — R41.3 MEMORY LOSS: ICD-10-CM

## 2023-07-06 DIAGNOSIS — Z98.890 HISTORY OF BACK SURGERY: ICD-10-CM

## 2023-07-06 DIAGNOSIS — F01.53 VASCULAR DEMENTIA WITH MOOD DISTURBANCE, UNSPECIFIED DEMENTIA SEVERITY (HCC): Primary | ICD-10-CM

## 2023-07-06 DIAGNOSIS — H35.033 HYPERTENSIVE RETINOPATHY OF BOTH EYES: ICD-10-CM

## 2023-07-06 DIAGNOSIS — I63.9 OCCIPITAL CEREBRAL INFARCTION (HCC): ICD-10-CM

## 2023-07-06 DIAGNOSIS — E11.42 TYPE 2 DIABETES MELLITUS WITH DIABETIC POLYNEUROPATHY, WITHOUT LONG-TERM CURRENT USE OF INSULIN (HCC): ICD-10-CM

## 2023-07-06 DIAGNOSIS — I63.00 CEREBROVASCULAR ACCIDENT (CVA) DUE TO THROMBOSIS OF PRECEREBRAL ARTERY (HCC): ICD-10-CM

## 2023-07-06 DIAGNOSIS — F03.90 DEMENTIA WITHOUT BEHAVIORAL DISTURBANCE (HCC): ICD-10-CM

## 2023-07-06 DIAGNOSIS — E11.9 TYPE 2 DIABETES MELLITUS WITHOUT COMPLICATION, WITHOUT LONG-TERM CURRENT USE OF INSULIN (HCC): ICD-10-CM

## 2023-07-06 PROCEDURE — 1036F TOBACCO NON-USER: CPT | Performed by: PSYCHIATRY & NEUROLOGY

## 2023-07-06 PROCEDURE — 3074F SYST BP LT 130 MM HG: CPT | Performed by: PSYCHIATRY & NEUROLOGY

## 2023-07-06 PROCEDURE — 2022F DILAT RTA XM EVC RTNOPTHY: CPT | Performed by: PSYCHIATRY & NEUROLOGY

## 2023-07-06 PROCEDURE — G8417 CALC BMI ABV UP PARAM F/U: HCPCS | Performed by: PSYCHIATRY & NEUROLOGY

## 2023-07-06 PROCEDURE — 1123F ACP DISCUSS/DSCN MKR DOCD: CPT | Performed by: PSYCHIATRY & NEUROLOGY

## 2023-07-06 PROCEDURE — G8427 DOCREV CUR MEDS BY ELIG CLIN: HCPCS | Performed by: PSYCHIATRY & NEUROLOGY

## 2023-07-06 PROCEDURE — 3046F HEMOGLOBIN A1C LEVEL >9.0%: CPT | Performed by: PSYCHIATRY & NEUROLOGY

## 2023-07-06 PROCEDURE — 3080F DIAST BP >= 90 MM HG: CPT | Performed by: PSYCHIATRY & NEUROLOGY

## 2023-07-06 PROCEDURE — 3017F COLORECTAL CA SCREEN DOC REV: CPT | Performed by: PSYCHIATRY & NEUROLOGY

## 2023-07-06 PROCEDURE — 99213 OFFICE O/P EST LOW 20 MIN: CPT | Performed by: PSYCHIATRY & NEUROLOGY

## 2023-07-06 PROCEDURE — 99214 OFFICE O/P EST MOD 30 MIN: CPT | Performed by: PSYCHIATRY & NEUROLOGY

## 2023-07-06 RX ORDER — CLOPIDOGREL BISULFATE 75 MG/1
TABLET ORAL
Qty: 90 TABLET | Refills: 3 | Status: SHIPPED | OUTPATIENT
Start: 2023-07-06

## 2023-07-06 ASSESSMENT — ENCOUNTER SYMPTOMS
ABDOMINAL PAIN: 0
TROUBLE SWALLOWING: 0
EYE DISCHARGE: 0
CHOKING: 0
DIARRHEA: 0
EYE PAIN: 0
ABDOMINAL DISTENTION: 0
APNEA: 0
SORE THROAT: 0
CHANGE IN BOWEL HABIT: 0
BOWEL INCONTINENCE: 0
COLOR CHANGE: 0
FACIAL SWELLING: 0
SHORTNESS OF BREATH: 0
EYE ITCHING: 0
EYE REDNESS: 0
CHEST TIGHTNESS: 0
COUGH: 0
VOICE CHANGE: 0
PHOTOPHOBIA: 0
BACK PAIN: 1
CONSTIPATION: 0
WHEEZING: 0
VISUAL CHANGE: 1
SINUS PRESSURE: 0
NAUSEA: 0
SWOLLEN GLANDS: 0
VOMITING: 0
BLOOD IN STOOL: 0

## 2023-07-06 NOTE — PROGRESS NOTES
[facility]  Neurology  [address], [place]  [place]:[phone]   Fax: [phone]        SUBJECTIVE:     PATIENT ID:  Yohana Melara is a   LEFT     HANDED 79 y.o. male. Cerebrovascular Accident  This is a chronic problem. Episode onset: SEPT. 2016   The problem has been unchanged. Associated symptoms include numbness and a visual change. Pertinent negatives include no abdominal pain, anorexia, arthralgias, change in bowel habit, chest pain, chills, congestion, coughing, diaphoresis, fatigue, fever, headaches, joint swelling, myalgias, nausea, neck pain, rash, sore throat, swollen glands, urinary symptoms, vertigo, vomiting or weakness. Nothing aggravates the symptoms. Treatments tried: ASA   AND PLAVIX   The treatment provided moderate relief. Neurologic Problem  The patient's primary symptoms include clumsiness, focal sensory loss, a loss of balance, memory loss and a visual change. The patient's pertinent negatives include no altered mental status, focal weakness, near-syncope, slurred speech, syncope or weakness. This is a chronic problem. Episode onset: MORE  THAN   2-3  YEARS. The neurological problem developed insidiously. There was lower extremity, left-sided and right-sided focality noted. Associated symptoms include back pain. Pertinent negatives include no abdominal pain, auditory change, aura, bladder incontinence, bowel incontinence, chest pain, confusion, diaphoresis, dizziness, fatigue, fever, headaches, light-headedness, nausea, neck pain, palpitations, shortness of breath, vertigo or vomiting. Past treatments include medication, bed rest and sleep. The treatment provided mild relief. His past medical history is significant for a CVA and dementia. There is no history of a bleeding disorder, a clotting disorder, head trauma, liver disease, mood changes or seizures.          History obtained from  The patient     And   HIS  WIFE       and

## 2023-07-11 ENCOUNTER — OFFICE VISIT (OUTPATIENT)
Dept: INTERNAL MEDICINE | Age: 68
End: 2023-07-11
Payer: MEDICARE

## 2023-07-11 VITALS
DIASTOLIC BLOOD PRESSURE: 78 MMHG | HEIGHT: 70 IN | SYSTOLIC BLOOD PRESSURE: 116 MMHG | BODY MASS INDEX: 31.78 KG/M2 | RESPIRATION RATE: 16 BRPM | OXYGEN SATURATION: 92 % | HEART RATE: 91 BPM | WEIGHT: 222 LBS

## 2023-07-11 DIAGNOSIS — R79.0 LOW MAGNESIUM LEVEL: ICD-10-CM

## 2023-07-11 DIAGNOSIS — I10 ESSENTIAL HYPERTENSION: ICD-10-CM

## 2023-07-11 DIAGNOSIS — E11.42 TYPE 2 DIABETES MELLITUS WITH DIABETIC POLYNEUROPATHY, WITHOUT LONG-TERM CURRENT USE OF INSULIN (HCC): ICD-10-CM

## 2023-07-11 DIAGNOSIS — E78.2 MIXED HYPERLIPIDEMIA: Primary | ICD-10-CM

## 2023-07-11 DIAGNOSIS — E55.9 VITAMIN D DEFICIENCY: ICD-10-CM

## 2023-07-11 DIAGNOSIS — E53.8 VITAMIN B 12 DEFICIENCY: ICD-10-CM

## 2023-07-11 PROCEDURE — 99212 OFFICE O/P EST SF 10 MIN: CPT | Performed by: INTERNAL MEDICINE

## 2023-07-11 PROCEDURE — 1123F ACP DISCUSS/DSCN MKR DOCD: CPT | Performed by: INTERNAL MEDICINE

## 2023-07-11 PROCEDURE — 1036F TOBACCO NON-USER: CPT | Performed by: INTERNAL MEDICINE

## 2023-07-11 PROCEDURE — 3074F SYST BP LT 130 MM HG: CPT | Performed by: INTERNAL MEDICINE

## 2023-07-11 PROCEDURE — 2022F DILAT RTA XM EVC RTNOPTHY: CPT | Performed by: INTERNAL MEDICINE

## 2023-07-11 PROCEDURE — G8427 DOCREV CUR MEDS BY ELIG CLIN: HCPCS | Performed by: INTERNAL MEDICINE

## 2023-07-11 PROCEDURE — 3017F COLORECTAL CA SCREEN DOC REV: CPT | Performed by: INTERNAL MEDICINE

## 2023-07-11 PROCEDURE — 3078F DIAST BP <80 MM HG: CPT | Performed by: INTERNAL MEDICINE

## 2023-07-11 PROCEDURE — G8417 CALC BMI ABV UP PARAM F/U: HCPCS | Performed by: INTERNAL MEDICINE

## 2023-07-11 PROCEDURE — 3046F HEMOGLOBIN A1C LEVEL >9.0%: CPT | Performed by: INTERNAL MEDICINE

## 2023-07-11 PROCEDURE — 99214 OFFICE O/P EST MOD 30 MIN: CPT | Performed by: INTERNAL MEDICINE

## 2023-07-11 SDOH — ECONOMIC STABILITY: FOOD INSECURITY: WITHIN THE PAST 12 MONTHS, YOU WORRIED THAT YOUR FOOD WOULD RUN OUT BEFORE YOU GOT MONEY TO BUY MORE.: NEVER TRUE

## 2023-07-11 SDOH — ECONOMIC STABILITY: FOOD INSECURITY: WITHIN THE PAST 12 MONTHS, THE FOOD YOU BOUGHT JUST DIDN'T LAST AND YOU DIDN'T HAVE MONEY TO GET MORE.: NEVER TRUE

## 2023-07-11 SDOH — ECONOMIC STABILITY: INCOME INSECURITY: HOW HARD IS IT FOR YOU TO PAY FOR THE VERY BASICS LIKE FOOD, HOUSING, MEDICAL CARE, AND HEATING?: NOT HARD AT ALL

## 2023-07-11 SDOH — ECONOMIC STABILITY: HOUSING INSECURITY
IN THE LAST 12 MONTHS, WAS THERE A TIME WHEN YOU DID NOT HAVE A STEADY PLACE TO SLEEP OR SLEPT IN A SHELTER (INCLUDING NOW)?: NO

## 2023-07-11 ASSESSMENT — PATIENT HEALTH QUESTIONNAIRE - PHQ9
SUM OF ALL RESPONSES TO PHQ QUESTIONS 1-9: 0
2. FEELING DOWN, DEPRESSED OR HOPELESS: 0
1. LITTLE INTEREST OR PLEASURE IN DOING THINGS: 0
SUM OF ALL RESPONSES TO PHQ9 QUESTIONS 1 & 2: 0
SUM OF ALL RESPONSES TO PHQ QUESTIONS 1-9: 0

## 2023-07-11 ASSESSMENT — ENCOUNTER SYMPTOMS
EYE PAIN: 0
CONSTIPATION: 0
ABDOMINAL PAIN: 0
SHORTNESS OF BREATH: 0
BLOOD IN STOOL: 0
VOMITING: 0
COUGH: 0
NAUSEA: 0
BACK PAIN: 0
DIARRHEA: 0

## 2023-07-11 NOTE — PROGRESS NOTES
Status:   Future     Standing Expiration Date:   7/11/2024    Comprehensive Metabolic Panel     Standing Status:   Future     Standing Expiration Date:   7/11/2024    Lipid Panel     Standing Status:   Future     Standing Expiration Date:   7/11/2024     Order Specific Question:   Is Patient Fasting?/# of Hours     Answer:   12    Magnesium     Standing Status:   Future     Standing Expiration Date:   7/11/2024    Vitamin D 25 Hydroxy     Standing Status:   Future     Standing Expiration Date:   7/11/2024    Vitamin B12 & Folate     Standing Status:   Future     Standing Expiration Date:   7/11/2024    Microalbumin, Ur     Standing Status:   Future     Standing Expiration Date:   7/11/2024     No orders of the defined types were placed in this encounter. Patientgiven educational materials - see patient instructions. Discussed use, benefit,and side effects of prescribed medications. All patient questions answered. Ptvoiced understanding. Reviewed health maintenance. Instructed to continue currentmedications, diet and exercise. Patient agreed with treatment plan. Follow up asdirected.      Electronically signed by Judithe Gosselin, MD on 7/11/2023 at 4:36 PM

## 2023-08-15 DIAGNOSIS — E11.9 TYPE 2 DIABETES MELLITUS WITHOUT COMPLICATION, WITHOUT LONG-TERM CURRENT USE OF INSULIN (HCC): ICD-10-CM

## 2023-08-15 DIAGNOSIS — I10 ESSENTIAL HYPERTENSION: ICD-10-CM

## 2023-08-15 DIAGNOSIS — E78.5 HYPERLIPIDEMIA, UNSPECIFIED HYPERLIPIDEMIA TYPE: ICD-10-CM

## 2023-08-15 RX ORDER — CYCLOBENZAPRINE HCL 10 MG
TABLET ORAL
Qty: 270 TABLET | Refills: 3 | Status: SHIPPED | OUTPATIENT
Start: 2023-08-15

## 2023-08-15 RX ORDER — LISINOPRIL 20 MG/1
TABLET ORAL
Qty: 90 TABLET | Refills: 3 | Status: SHIPPED | OUTPATIENT
Start: 2023-08-15

## 2023-08-15 RX ORDER — ATORVASTATIN CALCIUM 40 MG/1
TABLET, FILM COATED ORAL
Qty: 90 TABLET | Refills: 3 | Status: SHIPPED | OUTPATIENT
Start: 2023-08-15

## 2023-08-15 RX ORDER — AMLODIPINE BESYLATE 10 MG/1
TABLET ORAL
Qty: 90 TABLET | Refills: 3 | Status: SHIPPED | OUTPATIENT
Start: 2023-08-15

## 2023-08-15 NOTE — TELEPHONE ENCOUNTER
Amisha Unger called requesting a refill of the below medication which has been pended for you:     Requested Prescriptions     Pending Prescriptions Disp Refills    cyclobenzaprine (FLEXERIL) 10 MG tablet [Pharmacy Med Name: CYCLOBENZAPRINE 10 MG TABLET] 270 tablet 3     Sig: TAKE ONE TABLET BY MOUTH THREE TIMES A DAY    metFORMIN (GLUCOPHAGE) 500 MG tablet [Pharmacy Med Name: metFORMIN  MG TABLET] 180 tablet 3     Sig: TAKE ONE TABLET BY MOUTH TWICE A DAY    amLODIPine (NORVASC) 10 MG tablet [Pharmacy Med Name: amLODIPine BESYLATE 10 MG TAB] 90 tablet 3     Sig: TAKE ONE TABLET BY MOUTH DAILY    atorvastatin (LIPITOR) 40 MG tablet [Pharmacy Med Name: ATORVASTATIN 40 MG TABLET] 90 tablet 3     Sig: TAKE ONE TABLET BY MOUTH DAILY    lisinopril (PRINIVIL;ZESTRIL) 20 MG tablet [Pharmacy Med Name: LISINOPRIL 20 MG TABLET] 90 tablet 3     Sig: TAKE ONE TABLET BY MOUTH DAILY       Last Appointment Date: 7/11/2023  Next Appointment Date: 1/19/2024    No Known Allergies

## 2023-10-11 ENCOUNTER — HOSPITAL ENCOUNTER (OUTPATIENT)
Age: 68
Discharge: HOME | End: 2023-10-11
Payer: MEDICARE

## 2023-10-11 DIAGNOSIS — R97.20 ELEVATED PSA: ICD-10-CM

## 2023-10-11 LAB — PSA SERPL-MCNC: 1.82 NG/ML

## 2023-10-11 PROCEDURE — 36415 COLL VENOUS BLD VENIPUNCTURE: CPT

## 2023-10-11 PROCEDURE — 84153 ASSAY OF PSA TOTAL: CPT

## 2024-02-08 DIAGNOSIS — R41.3 MEMORY LOSS: ICD-10-CM

## 2024-02-08 RX ORDER — DONEPEZIL HYDROCHLORIDE 10 MG/1
TABLET, FILM COATED ORAL
Qty: 90 TABLET | Refills: 3 | Status: SHIPPED | OUTPATIENT
Start: 2024-02-08

## 2024-02-08 NOTE — TELEPHONE ENCOUNTER
Alexx called requesting a refill of the below medication which has been pended for you:     Requested Prescriptions     Pending Prescriptions Disp Refills    donepezil (ARICEPT) 10 MG tablet [Pharmacy Med Name: DONEPEZIL HCL 10 MG TABLET] 90 tablet 3     Sig: TAKE ONE TABLET BY MOUTH DAILY WITH SUPPER       Last Appointment Date: 7/11/2023  Next Appointment Date: 3/19/2024    No Known Allergies

## 2024-04-01 ENCOUNTER — OFFICE VISIT (OUTPATIENT)
Dept: INTERNAL MEDICINE | Age: 69
End: 2024-04-01
Payer: MEDICARE

## 2024-04-01 VITALS
DIASTOLIC BLOOD PRESSURE: 84 MMHG | RESPIRATION RATE: 16 BRPM | HEIGHT: 70 IN | SYSTOLIC BLOOD PRESSURE: 124 MMHG | HEART RATE: 98 BPM | OXYGEN SATURATION: 96 % | BODY MASS INDEX: 32.07 KG/M2 | WEIGHT: 224 LBS

## 2024-04-01 DIAGNOSIS — E11.42 TYPE 2 DIABETES MELLITUS WITH DIABETIC POLYNEUROPATHY, WITHOUT LONG-TERM CURRENT USE OF INSULIN (HCC): ICD-10-CM

## 2024-04-01 DIAGNOSIS — Z86.73 HISTORY OF STROKE: ICD-10-CM

## 2024-04-01 DIAGNOSIS — I10 ESSENTIAL HYPERTENSION: ICD-10-CM

## 2024-04-01 DIAGNOSIS — Z00.00 GENERAL MEDICAL EXAM: Primary | ICD-10-CM

## 2024-04-01 DIAGNOSIS — Z00.00 MEDICARE ANNUAL WELLNESS VISIT, SUBSEQUENT: ICD-10-CM

## 2024-04-01 DIAGNOSIS — E78.2 MIXED HYPERLIPIDEMIA: ICD-10-CM

## 2024-04-01 PROBLEM — I63.00 CEREBROVASCULAR ACCIDENT (CVA) DUE TO THROMBOSIS OF PRECEREBRAL ARTERY (HCC): Status: RESOLVED | Noted: 2021-03-29 | Resolved: 2024-04-01

## 2024-04-01 PROCEDURE — 3079F DIAST BP 80-89 MM HG: CPT | Performed by: INTERNAL MEDICINE

## 2024-04-01 PROCEDURE — 1036F TOBACCO NON-USER: CPT | Performed by: INTERNAL MEDICINE

## 2024-04-01 PROCEDURE — 3017F COLORECTAL CA SCREEN DOC REV: CPT | Performed by: INTERNAL MEDICINE

## 2024-04-01 PROCEDURE — 3046F HEMOGLOBIN A1C LEVEL >9.0%: CPT | Performed by: INTERNAL MEDICINE

## 2024-04-01 PROCEDURE — 1123F ACP DISCUSS/DSCN MKR DOCD: CPT | Performed by: INTERNAL MEDICINE

## 2024-04-01 PROCEDURE — 99212 OFFICE O/P EST SF 10 MIN: CPT | Performed by: INTERNAL MEDICINE

## 2024-04-01 PROCEDURE — G8417 CALC BMI ABV UP PARAM F/U: HCPCS | Performed by: INTERNAL MEDICINE

## 2024-04-01 PROCEDURE — 99214 OFFICE O/P EST MOD 30 MIN: CPT | Performed by: INTERNAL MEDICINE

## 2024-04-01 PROCEDURE — 2022F DILAT RTA XM EVC RTNOPTHY: CPT | Performed by: INTERNAL MEDICINE

## 2024-04-01 PROCEDURE — G0439 PPPS, SUBSEQ VISIT: HCPCS | Performed by: INTERNAL MEDICINE

## 2024-04-01 PROCEDURE — G8427 DOCREV CUR MEDS BY ELIG CLIN: HCPCS | Performed by: INTERNAL MEDICINE

## 2024-04-01 PROCEDURE — 3074F SYST BP LT 130 MM HG: CPT | Performed by: INTERNAL MEDICINE

## 2024-04-01 SDOH — HEALTH STABILITY: PHYSICAL HEALTH: ON AVERAGE, HOW MANY DAYS PER WEEK DO YOU ENGAGE IN MODERATE TO STRENUOUS EXERCISE (LIKE A BRISK WALK)?: 0 DAYS

## 2024-04-01 SDOH — HEALTH STABILITY: PHYSICAL HEALTH: ON AVERAGE, HOW MANY MINUTES DO YOU ENGAGE IN EXERCISE AT THIS LEVEL?: 0 MIN

## 2024-04-01 ASSESSMENT — PATIENT HEALTH QUESTIONNAIRE - PHQ9
1. LITTLE INTEREST OR PLEASURE IN DOING THINGS: SEVERAL DAYS
SUM OF ALL RESPONSES TO PHQ QUESTIONS 1-9: 9
8. MOVING OR SPEAKING SO SLOWLY THAT OTHER PEOPLE COULD HAVE NOTICED. OR THE OPPOSITE, BEING SO FIGETY OR RESTLESS THAT YOU HAVE BEEN MOVING AROUND A LOT MORE THAN USUAL: SEVERAL DAYS
2. FEELING DOWN, DEPRESSED OR HOPELESS: MORE THAN HALF THE DAYS
3. TROUBLE FALLING OR STAYING ASLEEP: SEVERAL DAYS
10. IF YOU CHECKED OFF ANY PROBLEMS, HOW DIFFICULT HAVE THESE PROBLEMS MADE IT FOR YOU TO DO YOUR WORK, TAKE CARE OF THINGS AT HOME, OR GET ALONG WITH OTHER PEOPLE: SOMEWHAT DIFFICULT
SUM OF ALL RESPONSES TO PHQ QUESTIONS 1-9: 9
SUM OF ALL RESPONSES TO PHQ QUESTIONS 1-9: 9
SUM OF ALL RESPONSES TO PHQ9 QUESTIONS 1 & 2: 3
5. POOR APPETITE OR OVEREATING: SEVERAL DAYS
9. THOUGHTS THAT YOU WOULD BE BETTER OFF DEAD, OR OF HURTING YOURSELF: NOT AT ALL
4. FEELING TIRED OR HAVING LITTLE ENERGY: SEVERAL DAYS
7. TROUBLE CONCENTRATING ON THINGS, SUCH AS READING THE NEWSPAPER OR WATCHING TELEVISION: MORE THAN HALF THE DAYS
SUM OF ALL RESPONSES TO PHQ QUESTIONS 1-9: 9
6. FEELING BAD ABOUT YOURSELF - OR THAT YOU ARE A FAILURE OR HAVE LET YOURSELF OR YOUR FAMILY DOWN: NOT AT ALL

## 2024-04-01 ASSESSMENT — ENCOUNTER SYMPTOMS
VOMITING: 0
DIARRHEA: 0
CONSTIPATION: 0
ABDOMINAL PAIN: 0
BLOOD IN STOOL: 0
COUGH: 0
NAUSEA: 0
EYE PAIN: 0

## 2024-04-01 ASSESSMENT — LIFESTYLE VARIABLES
HOW OFTEN DO YOU HAVE A DRINK CONTAINING ALCOHOL: NEVER
HOW OFTEN DO YOU HAVE A DRINK CONTAINING ALCOHOL: 1
HOW MANY STANDARD DRINKS CONTAINING ALCOHOL DO YOU HAVE ON A TYPICAL DAY: 0
HOW MANY STANDARD DRINKS CONTAINING ALCOHOL DO YOU HAVE ON A TYPICAL DAY: PATIENT DOES NOT DRINK
HOW OFTEN DO YOU HAVE SIX OR MORE DRINKS ON ONE OCCASION: 1

## 2024-04-01 NOTE — PROGRESS NOTES
[facility]  [facility]  [address] [facility] [address]  Dept: [phone]  Dept Fax: [phone]  Loc: [phone]     Alexx Aguero is a 68 y.o. male who presents today for his medical conditions/complaintsas noted below.  Alexx Aguero is c/o of   Chief Complaint   Patient presents with    Medicare AWV    Hypertension    Hyperlipidemia    Diabetes         HPI:     Hypertension  This is a chronic problem. The current episode started more than 1 year ago. The problem has been waxing and waning since onset. The problem is controlled. Pertinent negatives include no chest pain, headaches, neck pain, palpitations or shortness of breath.   Hyperlipidemia  This is a chronic problem. The current episode started more than 1 year ago. The problem is controlled. Recent lipid tests were reviewed and are variable. Pertinent negatives include no chest pain or shortness of breath.   Diabetes  He presents for his follow-up diabetic visit. He has type 2 diabetes mellitus. The initial diagnosis of diabetes was made 13 years ago. His disease course has been fluctuating. Pertinent negatives for hypoglycemia include no confusion, dizziness, headaches, nervousness/anxiousness or pallor. Pertinent negatives for diabetes include no chest pain, no polydipsia, no polyuria and no weakness.   Other  This is a recurrent (4-General medical exam) problem. The current episode started today. The problem occurs intermittently. The problem has been waxing and waning. Pertinent negatives include no abdominal pain, arthralgias, chest pain, chills, coughing, fever, headaches, nausea, neck pain, numbness, rash, vomiting or weakness.       Hemoglobin A1C (%)   Date Value   08/05/2022 6.3 (H)   07/13/2021 6.4 (H)   06/12/2020 6.2 (H)            No components found for: \"LABMICR\"  LDL Cholesterol (mg/dL)   Date Value   08/05/2022 57   07/13/2021 07/02/2019 51

## 2024-04-01 NOTE — PATIENT INSTRUCTIONS
to 3 hours or after sweating, drying off with a towel, or swimming.  Always wear a seat belt when traveling in a car. Always wear a helmet when riding a bicycle or motorcycle.

## 2024-04-01 NOTE — PROGRESS NOTES
Medicare Annual Wellness Visit    Alexx Aguero is here for Medicare AWV, Hypertension, Hyperlipidemia, and Diabetes    Assessment & Plan     Recommendations for Preventive Services Due: see orders and patient instructions/AVS.  Recommended screening schedule for the next 5-10 years is provided to the patient in written form: see Patient Instructions/AVS.     Return in 6 months (on 10/8/2024) for Hypertension, Hyperlipidemia, Diabetes.     Subjective       Patient's complete Health Risk Assessment and screening values have been reviewed and are found in Flowsheets. The following problems were reviewed today and where indicated follow up appointments were made and/or referrals ordered.    Positive Risk Factor Screenings with Interventions:        Depression:  PHQ-2 Score: 3  PHQ-9 Total Score: 9    Interpretation:  5-9 mild   10-14 moderate   15-19 moderately severe   20-27 severe   Interventions:  Patient comments: patient is feeling down and depressed due to the grieving process of just having to put their dog down.           Activity, Diet, and Weight:  On average, how many days per week do you engage in moderate to strenuous exercise (like a brisk walk)?: 0 days  On average, how many minutes do you engage in exercise at this level?: 0 min    Do you eat balanced/healthy meals regularly?: Yes    Body mass index is 32.14 kg/m². (!) Abnormal      Inactivity Interventions:  Patient declined any further interventions or treatment  Obesity Interventions:  Patient declines any further evaluation or treatment            Dentist Screen:  Have you seen the dentist within the past year?: (!) No    Intervention:  Patient declines any further evaluation or treatment     Vision Screen:  Do you have difficulty driving, watching TV, or doing any of your daily activities because of your eyesight?: (!) Yes  Have you had an eye exam within the past year?: (!) No  No results found.    Interventions:   Patient declines any

## 2024-04-04 ENCOUNTER — OFFICE VISIT (OUTPATIENT)
Dept: NEUROLOGY | Age: 69
End: 2024-04-04
Payer: MEDICARE

## 2024-04-04 VITALS
OXYGEN SATURATION: 96 % | RESPIRATION RATE: 18 BRPM | WEIGHT: 221 LBS | HEART RATE: 96 BPM | DIASTOLIC BLOOD PRESSURE: 82 MMHG | SYSTOLIC BLOOD PRESSURE: 128 MMHG | BODY MASS INDEX: 31.71 KG/M2

## 2024-04-04 DIAGNOSIS — H53.461 RIGHT HOMONYMOUS HEMIANOPSIA: ICD-10-CM

## 2024-04-04 DIAGNOSIS — E11.9 TYPE 2 DIABETES MELLITUS WITHOUT COMPLICATION, WITHOUT LONG-TERM CURRENT USE OF INSULIN (HCC): ICD-10-CM

## 2024-04-04 DIAGNOSIS — I10 ESSENTIAL HYPERTENSION: ICD-10-CM

## 2024-04-04 DIAGNOSIS — R41.3 MEMORY LOSS: ICD-10-CM

## 2024-04-04 DIAGNOSIS — F03.90 DEMENTIA WITHOUT BEHAVIORAL DISTURBANCE (HCC): ICD-10-CM

## 2024-04-04 DIAGNOSIS — Z86.73 H/O: STROKE: ICD-10-CM

## 2024-04-04 DIAGNOSIS — E11.42 TYPE 2 DIABETES MELLITUS WITH DIABETIC POLYNEUROPATHY, WITHOUT LONG-TERM CURRENT USE OF INSULIN (HCC): ICD-10-CM

## 2024-04-04 DIAGNOSIS — Z98.890 HISTORY OF BACK SURGERY: ICD-10-CM

## 2024-04-04 DIAGNOSIS — Z87.898 H/O SYNCOPE: ICD-10-CM

## 2024-04-04 DIAGNOSIS — I63.89 INFARCTION OF TEMPORAL LOBE (HCC): ICD-10-CM

## 2024-04-04 DIAGNOSIS — F01.53 VASCULAR DEMENTIA WITH MOOD DISTURBANCE, UNSPECIFIED DEMENTIA SEVERITY (HCC): Primary | ICD-10-CM

## 2024-04-04 PROCEDURE — 99214 OFFICE O/P EST MOD 30 MIN: CPT | Performed by: PSYCHIATRY & NEUROLOGY

## 2024-04-04 PROCEDURE — 3017F COLORECTAL CA SCREEN DOC REV: CPT | Performed by: PSYCHIATRY & NEUROLOGY

## 2024-04-04 PROCEDURE — 1123F ACP DISCUSS/DSCN MKR DOCD: CPT | Performed by: PSYCHIATRY & NEUROLOGY

## 2024-04-04 PROCEDURE — 3074F SYST BP LT 130 MM HG: CPT | Performed by: PSYCHIATRY & NEUROLOGY

## 2024-04-04 PROCEDURE — G8417 CALC BMI ABV UP PARAM F/U: HCPCS | Performed by: PSYCHIATRY & NEUROLOGY

## 2024-04-04 PROCEDURE — 3046F HEMOGLOBIN A1C LEVEL >9.0%: CPT | Performed by: PSYCHIATRY & NEUROLOGY

## 2024-04-04 PROCEDURE — 3079F DIAST BP 80-89 MM HG: CPT | Performed by: PSYCHIATRY & NEUROLOGY

## 2024-04-04 PROCEDURE — G8427 DOCREV CUR MEDS BY ELIG CLIN: HCPCS | Performed by: PSYCHIATRY & NEUROLOGY

## 2024-04-04 PROCEDURE — 2022F DILAT RTA XM EVC RTNOPTHY: CPT | Performed by: PSYCHIATRY & NEUROLOGY

## 2024-04-04 PROCEDURE — 1036F TOBACCO NON-USER: CPT | Performed by: PSYCHIATRY & NEUROLOGY

## 2024-04-04 PROCEDURE — 99215 OFFICE O/P EST HI 40 MIN: CPT | Performed by: PSYCHIATRY & NEUROLOGY

## 2024-04-04 RX ORDER — CLOPIDOGREL BISULFATE 75 MG/1
TABLET ORAL
Qty: 90 TABLET | Refills: 1 | Status: SHIPPED | OUTPATIENT
Start: 2024-04-04

## 2024-04-04 ASSESSMENT — PATIENT HEALTH QUESTIONNAIRE - PHQ9
SUM OF ALL RESPONSES TO PHQ QUESTIONS 1-9: 0
1. LITTLE INTEREST OR PLEASURE IN DOING THINGS: NOT AT ALL
SUM OF ALL RESPONSES TO PHQ QUESTIONS 1-9: 0
SUM OF ALL RESPONSES TO PHQ9 QUESTIONS 1 & 2: 0
2. FEELING DOWN, DEPRESSED OR HOPELESS: NOT AT ALL

## 2024-04-04 ASSESSMENT — ENCOUNTER SYMPTOMS
APNEA: 0
VOICE CHANGE: 0
CHOKING: 0
FACIAL SWELLING: 0
COUGH: 0
SHORTNESS OF BREATH: 0
WHEEZING: 0
SINUS PRESSURE: 0
TROUBLE SWALLOWING: 0
CHEST TIGHTNESS: 0

## 2024-04-04 NOTE — PROGRESS NOTES
extracranial structures including paranasal sinuses and mastoid air cells are   unremarkable. No fracture is identified.           Impression       Stable study.  No significant change since prior head CT of 2019.       Chronic encephalomalacia changes within the left occipital lobe and posterior   aspect of right temporal lobe.       No evidence for acute intracranial hemorrhage or intracranial mass lesion.       Moderate chronic microangiopathic ischemic disease.       Mild generalized volume loss.           CT OF THE HEAD WITHOUT CONTRAST  7/12/2019 4:17 pm       TECHNIQUE:   CT of the head was performed without the administration of intravenous   contrast. Dose modulation, iterative reconstruction, and/or weight based   adjustment of the mA/kV was utilized to reduce the radiation dose to as low   as reasonably achievable.       COMPARISON:   None.       HISTORY:   ORDERING SYSTEM PROVIDED HISTORY: Right homonymous hemianopsia   TECHNOLOGIST PROVIDED HISTORY:   history of CVA   Reason for Exam: Balance and memory loss with vision changes.  History of CVA   1 year ago   Acuity: Chronic   Type of Exam: Initial       FINDINGS:   BRAIN/VENTRICLES: There is no acute intracranial hemorrhage, mass effect or   midline shift.  No abnormal extra-axial fluid collection.  The gray-white   differentiation is maintained without evidence of an acute infarct.  There is   no evidence of hydrocephalus. Old ischemic strokes with resultant   encephalomalacia in the left occipital lobe and posterior aspect of right   temporal lobe.       ORBITS: The visualized portion of the orbits demonstrate no acute abnormality.       SINUSES: The visualized paranasal sinuses and mastoid air cells demonstrate   no acute abnormality.       SOFT TISSUES/SKULL:  No acute abnormality of the visualized skull or soft   tissues.  Small sebaceous cyst with calcifications in the subcutaneous fat of   the posterior parietal scalp along the midline.

## 2024-08-14 DIAGNOSIS — I10 ESSENTIAL HYPERTENSION: ICD-10-CM

## 2024-08-14 DIAGNOSIS — E11.9 TYPE 2 DIABETES MELLITUS WITHOUT COMPLICATION, WITHOUT LONG-TERM CURRENT USE OF INSULIN (HCC): ICD-10-CM

## 2024-08-14 DIAGNOSIS — E78.5 HYPERLIPIDEMIA, UNSPECIFIED HYPERLIPIDEMIA TYPE: ICD-10-CM

## 2024-08-14 RX ORDER — AMLODIPINE BESYLATE 10 MG/1
TABLET ORAL
Qty: 90 TABLET | Refills: 0 | Status: SHIPPED | OUTPATIENT
Start: 2024-08-14

## 2024-08-14 RX ORDER — ATORVASTATIN CALCIUM 40 MG/1
TABLET, FILM COATED ORAL
Qty: 90 TABLET | Refills: 0 | Status: SHIPPED | OUTPATIENT
Start: 2024-08-14

## 2024-08-14 RX ORDER — LISINOPRIL 20 MG/1
TABLET ORAL
Qty: 90 TABLET | Refills: 0 | Status: SHIPPED | OUTPATIENT
Start: 2024-08-14

## 2024-10-03 RX ORDER — CYCLOBENZAPRINE HCL 10 MG
TABLET ORAL
Qty: 270 TABLET | Refills: 3 | Status: SHIPPED | OUTPATIENT
Start: 2024-10-03

## 2024-10-03 NOTE — TELEPHONE ENCOUNTER
Alexx called requesting a refill of the below medication which has been pended for you:     Requested Prescriptions     Pending Prescriptions Disp Refills    cyclobenzaprine (FLEXERIL) 10 MG tablet [Pharmacy Med Name: CYCLOBENZAPRINE 10 MG TABLET] 270 tablet 3     Sig: TAKE ONE TABLET BY MOUTH THREE TIMES A DAY       Last Appointment Date: 4/1/2024  Next Appointment Date: 11/4/2024    No Known Allergies

## 2024-11-12 ENCOUNTER — HOSPITAL ENCOUNTER (OUTPATIENT)
Age: 69
Discharge: HOME | End: 2024-11-12
Payer: MEDICARE

## 2024-11-12 DIAGNOSIS — E78.2 MIXED HYPERLIPIDEMIA: ICD-10-CM

## 2024-11-12 DIAGNOSIS — E11.42 TYPE 2 DIABETES MELLITUS WITH DIABETIC POLYNEUROPATHY, WITHOUT LONG-TERM CURRENT USE OF INSULIN (HCC): ICD-10-CM

## 2024-11-12 LAB
CHOLEST SERPL-MCNC: 178 MG/DL (ref 0–199)
CHOLESTEROL/HDL RATIO: 4.8
EST. AVERAGE GLUCOSE BLD GHB EST-MCNC: 143 MG/DL
HBA1C MFR BLD: 6.6 % (ref 4–6)
HDLC SERPL-MCNC: 37 MG/DL
LDLC SERPL CALC-MCNC: ABNORMAL MG/DL (ref 0–100)
LDLC SERPL DIRECT ASSAY-MCNC: 88 MG/DL
TRIGL SERPL-MCNC: 464 MG/DL (ref 0–149)
VLDLC SERPL CALC-MCNC: ABNORMAL MG/DL (ref 1–30)

## 2024-11-12 PROCEDURE — 80061 LIPID PANEL: CPT

## 2024-11-12 PROCEDURE — 83721 ASSAY OF BLOOD LIPOPROTEIN: CPT

## 2024-11-12 PROCEDURE — 36415 COLL VENOUS BLD VENIPUNCTURE: CPT

## 2024-11-12 PROCEDURE — 83036 HEMOGLOBIN GLYCOSYLATED A1C: CPT

## 2024-11-13 DIAGNOSIS — Z86.73 H/O: STROKE: ICD-10-CM

## 2024-11-18 RX ORDER — CLOPIDOGREL BISULFATE 75 MG/1
TABLET ORAL
Qty: 90 TABLET | Refills: 0 | Status: SHIPPED | OUTPATIENT
Start: 2024-11-18

## 2024-11-22 ENCOUNTER — OFFICE VISIT (OUTPATIENT)
Dept: INTERNAL MEDICINE | Age: 69
End: 2024-11-22

## 2024-11-22 VITALS
HEIGHT: 70 IN | HEART RATE: 97 BPM | OXYGEN SATURATION: 97 % | SYSTOLIC BLOOD PRESSURE: 136 MMHG | WEIGHT: 225 LBS | BODY MASS INDEX: 32.21 KG/M2 | RESPIRATION RATE: 16 BRPM | DIASTOLIC BLOOD PRESSURE: 86 MMHG

## 2024-11-22 DIAGNOSIS — E11.42 TYPE 2 DIABETES MELLITUS WITH DIABETIC POLYNEUROPATHY, WITHOUT LONG-TERM CURRENT USE OF INSULIN (HCC): ICD-10-CM

## 2024-11-22 DIAGNOSIS — E78.5 HYPERLIPIDEMIA, UNSPECIFIED HYPERLIPIDEMIA TYPE: ICD-10-CM

## 2024-11-22 DIAGNOSIS — Z87.891 PERSONAL HISTORY OF TOBACCO USE: ICD-10-CM

## 2024-11-22 DIAGNOSIS — E83.42 HYPOMAGNESEMIA: ICD-10-CM

## 2024-11-22 DIAGNOSIS — Z86.73 H/O: STROKE: ICD-10-CM

## 2024-11-22 DIAGNOSIS — E53.8 B12 DEFICIENCY: ICD-10-CM

## 2024-11-22 DIAGNOSIS — Z12.5 SPECIAL SCREENING FOR MALIGNANT NEOPLASM OF PROSTATE: ICD-10-CM

## 2024-11-22 DIAGNOSIS — I10 ESSENTIAL HYPERTENSION: Primary | ICD-10-CM

## 2024-11-22 DIAGNOSIS — E55.9 VITAMIN D DEFICIENCY: ICD-10-CM

## 2024-11-22 RX ORDER — ATORVASTATIN CALCIUM 40 MG/1
TABLET, FILM COATED ORAL
Qty: 90 TABLET | Refills: 3 | Status: SHIPPED | OUTPATIENT
Start: 2024-11-22

## 2024-11-22 RX ORDER — LISINOPRIL 20 MG/1
TABLET ORAL
Qty: 90 TABLET | Refills: 3 | Status: SHIPPED | OUTPATIENT
Start: 2024-11-22

## 2024-11-22 RX ORDER — AMLODIPINE BESYLATE 10 MG/1
TABLET ORAL
Qty: 90 TABLET | Refills: 3 | Status: SHIPPED | OUTPATIENT
Start: 2024-11-22

## 2024-11-22 SDOH — ECONOMIC STABILITY: FOOD INSECURITY: WITHIN THE PAST 12 MONTHS, YOU WORRIED THAT YOUR FOOD WOULD RUN OUT BEFORE YOU GOT MONEY TO BUY MORE.: NEVER TRUE

## 2024-11-22 SDOH — ECONOMIC STABILITY: FOOD INSECURITY: WITHIN THE PAST 12 MONTHS, THE FOOD YOU BOUGHT JUST DIDN'T LAST AND YOU DIDN'T HAVE MONEY TO GET MORE.: NEVER TRUE

## 2024-11-22 SDOH — ECONOMIC STABILITY: INCOME INSECURITY: HOW HARD IS IT FOR YOU TO PAY FOR THE VERY BASICS LIKE FOOD, HOUSING, MEDICAL CARE, AND HEATING?: NOT HARD AT ALL

## 2024-11-22 ASSESSMENT — ENCOUNTER SYMPTOMS
BLOOD IN STOOL: 0
CONSTIPATION: 0
COUGH: 0
BACK PAIN: 0
NAUSEA: 0
VOMITING: 0
DIARRHEA: 0
EYE PAIN: 0
SHORTNESS OF BREATH: 0
ABDOMINAL PAIN: 0

## 2024-11-22 NOTE — PROGRESS NOTES
[facility]  [facility]MENT OF [facility]  [address] [facility] [address]  Dept: [phone]  Dept Fax: [phone]  Loc: [phone]     Alexx Aguero is a 69 y.o. male who presents today for his medical conditions/complaintsas noted below.  Alexx Aguero is c/o of   Chief Complaint   Patient presents with    Hypertension    Hyperlipidemia    Diabetes    Stroke     History of         HPI:     Hypertension  This is a chronic problem. The current episode started more than 1 year ago. The problem has been waxing and waning since onset. The problem is controlled. Pertinent negatives include no chest pain, headaches, neck pain, palpitations or shortness of breath.   Hyperlipidemia  This is a chronic problem. The current episode started more than 1 year ago. The problem is controlled. Recent lipid tests were reviewed and are variable. Pertinent negatives include no chest pain or shortness of breath.   Diabetes  He presents for his follow-up diabetic visit. He has type 2 diabetes mellitus. The initial diagnosis of diabetes was made 14 years ago. His disease course has been fluctuating. Pertinent negatives for hypoglycemia include no confusion, dizziness, headaches, nervousness/anxiousness or pallor. Pertinent negatives for diabetes include no chest pain, no polydipsia, no polyuria and no weakness.   Stroke  This is a chronic (History of stroke back in 2016) problem. The current episode started more than 1 year ago. The problem occurs constantly. The problem has been unchanged. Pertinent negatives include no abdominal pain, arthralgias, chest pain, chills, coughing, fever, headaches, nausea, neck pain, numbness, rash, vomiting or weakness.       Hemoglobin A1C (%)   Date Value   11/12/2024 6.6 (H)   08/05/2022 6.3 (H)   07/13/2021 6.4 (H)            No components found for: \"LABMICR\"  No components found for: \"LDLCHOLESTEROL\", \"LDLCALC\"      AST (U/L)

## 2024-11-22 NOTE — PROGRESS NOTES
Discussed with the patient the current USPSTF guidelines released March 9, 2021 for screening for lung cancer.    For adults aged 50 to 80 years who have a 20 pack-year smoking history and currently smoke or have quit within the past 15 years the grade B recommendation is to:  Screen for lung cancer with low-dose computed tomography (LDCT) every year.  Stop screening once a person has not smoked for 15 years or has a health problem that limits life expectancy or the ability to have lung surgery.    The patient  reports that he quit smoking about 8 years ago. His smoking use included cigarettes. He started smoking about 38 years ago. He has a 60.8 pack-year smoking history. He has never used smokeless tobacco.. Discussed with patient the risks and benefits of screening, including over-diagnosis, false positive rate, and total radiation exposure.  The patient currently exhibits no signs or symptoms suggestive of lung cancer.  Discussed with patient the importance of compliance with yearly annual lung cancer screenings and willingness to undergo diagnosis and treatment if screening scan is positive.  In addition, the patient was counseled regarding the importance of remaining smoke free and/or total smoking cessation.    Also reviewed the following if the patient has Medicare that as of February 10, 2022, Medicare only covers LDCT screening in patients aged 50-77 with at least a 20 pack-year smoking history who currently smoke or have quit in the last 15 years

## 2025-02-02 DIAGNOSIS — R41.3 MEMORY LOSS: ICD-10-CM

## 2025-02-03 RX ORDER — DONEPEZIL HYDROCHLORIDE 10 MG/1
TABLET, FILM COATED ORAL
Qty: 90 TABLET | Refills: 3 | Status: SHIPPED | OUTPATIENT
Start: 2025-02-03

## 2025-02-03 NOTE — TELEPHONE ENCOUNTER
Alexx called requesting a refill of the below medication which has been pended for you:     Requested Prescriptions     Pending Prescriptions Disp Refills    donepezil (ARICEPT) 10 MG tablet [Pharmacy Med Name: DONEPEZIL HCL 10 MG TABLET] 90 tablet 3     Sig: TAKE 1 TABLET BY MOUTH DAILY WITH SUPPER       Last Appointment Date: 11/22/2024  Next Appointment Date: 5/30/2025    No Known Allergies

## 2025-02-14 DIAGNOSIS — Z86.73 H/O: STROKE: ICD-10-CM

## 2025-02-14 RX ORDER — CLOPIDOGREL BISULFATE 75 MG/1
TABLET ORAL
Qty: 90 TABLET | Refills: 0 | Status: SHIPPED | OUTPATIENT
Start: 2025-02-14

## 2025-02-14 NOTE — TELEPHONE ENCOUNTER
Last Appt:  4/4/2024  Next Appt:   3/13/2025  Med verified in [facility]     Patient is requesting a refill of plavix    [employer]

## 2025-04-04 ENCOUNTER — OFFICE VISIT (OUTPATIENT)
Dept: NEUROLOGY | Age: 70
End: 2025-04-04
Payer: MEDICARE

## 2025-04-04 VITALS
BODY MASS INDEX: 31.82 KG/M2 | SYSTOLIC BLOOD PRESSURE: 136 MMHG | TEMPERATURE: 98.2 F | HEIGHT: 70 IN | DIASTOLIC BLOOD PRESSURE: 84 MMHG | WEIGHT: 222.3 LBS | HEART RATE: 99 BPM | OXYGEN SATURATION: 96 %

## 2025-04-04 DIAGNOSIS — I63.89 INFARCTION OF TEMPORAL LOBE (HCC): ICD-10-CM

## 2025-04-04 DIAGNOSIS — F03.90 DEMENTIA WITHOUT BEHAVIORAL DISTURBANCE (HCC): ICD-10-CM

## 2025-04-04 DIAGNOSIS — Z98.890 HISTORY OF BACK SURGERY: ICD-10-CM

## 2025-04-04 DIAGNOSIS — Z87.898 H/O SYNCOPE: ICD-10-CM

## 2025-04-04 DIAGNOSIS — Z86.73 H/O: STROKE: ICD-10-CM

## 2025-04-04 DIAGNOSIS — E11.9 TYPE 2 DIABETES MELLITUS WITHOUT COMPLICATION, WITHOUT LONG-TERM CURRENT USE OF INSULIN: ICD-10-CM

## 2025-04-04 DIAGNOSIS — H53.461 RIGHT HOMONYMOUS HEMIANOPSIA: ICD-10-CM

## 2025-04-04 DIAGNOSIS — F01.53 VASCULAR DEMENTIA WITH MOOD DISTURBANCE, UNSPECIFIED DEMENTIA SEVERITY (HCC): Primary | ICD-10-CM

## 2025-04-04 DIAGNOSIS — I10 ESSENTIAL HYPERTENSION: ICD-10-CM

## 2025-04-04 PROCEDURE — 99214 OFFICE O/P EST MOD 30 MIN: CPT | Performed by: PSYCHIATRY & NEUROLOGY

## 2025-04-04 PROCEDURE — 99215 OFFICE O/P EST HI 40 MIN: CPT | Performed by: PSYCHIATRY & NEUROLOGY

## 2025-04-04 PROCEDURE — 1159F MED LIST DOCD IN RCRD: CPT | Performed by: PSYCHIATRY & NEUROLOGY

## 2025-04-04 PROCEDURE — 3017F COLORECTAL CA SCREEN DOC REV: CPT | Performed by: PSYCHIATRY & NEUROLOGY

## 2025-04-04 PROCEDURE — 3079F DIAST BP 80-89 MM HG: CPT | Performed by: PSYCHIATRY & NEUROLOGY

## 2025-04-04 PROCEDURE — 2022F DILAT RTA XM EVC RTNOPTHY: CPT | Performed by: PSYCHIATRY & NEUROLOGY

## 2025-04-04 PROCEDURE — G8417 CALC BMI ABV UP PARAM F/U: HCPCS | Performed by: PSYCHIATRY & NEUROLOGY

## 2025-04-04 PROCEDURE — G8427 DOCREV CUR MEDS BY ELIG CLIN: HCPCS | Performed by: PSYCHIATRY & NEUROLOGY

## 2025-04-04 PROCEDURE — 1036F TOBACCO NON-USER: CPT | Performed by: PSYCHIATRY & NEUROLOGY

## 2025-04-04 PROCEDURE — 1123F ACP DISCUSS/DSCN MKR DOCD: CPT | Performed by: PSYCHIATRY & NEUROLOGY

## 2025-04-04 PROCEDURE — 3046F HEMOGLOBIN A1C LEVEL >9.0%: CPT | Performed by: PSYCHIATRY & NEUROLOGY

## 2025-04-04 PROCEDURE — 3075F SYST BP GE 130 - 139MM HG: CPT | Performed by: PSYCHIATRY & NEUROLOGY

## 2025-04-04 PROCEDURE — 1160F RVW MEDS BY RX/DR IN RCRD: CPT | Performed by: PSYCHIATRY & NEUROLOGY

## 2025-04-04 RX ORDER — CLOPIDOGREL BISULFATE 75 MG/1
TABLET ORAL
Qty: 90 TABLET | Refills: 1 | Status: SHIPPED | OUTPATIENT
Start: 2025-04-04

## 2025-04-04 ASSESSMENT — ENCOUNTER SYMPTOMS
WHEEZING: 0
SINUS PRESSURE: 0
COUGH: 0
TROUBLE SWALLOWING: 0
SORE THROAT: 0
APNEA: 0
SHORTNESS OF BREATH: 0
VOICE CHANGE: 0
SWOLLEN GLANDS: 0
FACIAL SWELLING: 0
CHEST TIGHTNESS: 0
CHOKING: 0

## 2025-04-04 NOTE — PROGRESS NOTES
[facility]  Neurology  [address] [place] [id number]  Phone:[phone]   Fax: [phone]        SUBJECTIVE:     PATIENT ID:  Alexx Aguero is a   LEFT     HANDED 69 y.o. male.          Cerebrovascular Accident  This is a chronic problem. Episode onset: SEPT. 2016   The problem has been unchanged. Pertinent negatives include no congestion, coughing, sore throat, swollen glands or urinary symptoms. Nothing aggravates the symptoms. Treatments tried: ASA   AND PLAVIX   The treatment provided moderate relief.   Neurologic Problem  The patient's primary symptoms include clumsiness and focal sensory loss. The patient's pertinent negatives include no slurred speech. This is a chronic problem. Episode onset: MORE  THAN   2-3  YEARS. The neurological problem developed insidiously. There was lower extremity, left-sided and right-sided focality noted. Pertinent negatives include no auditory change, aura or shortness of breath. Past treatments include medication, bed rest and sleep. The treatment provided mild relief. His past medical history is significant for a CVA. There is no history of a bleeding disorder, a clotting disorder, head trauma, liver disease or seizures.           History obtained from  The patient     And   HIS  WIFE       and other  available   medical  records   were  Also  reviewed.          The  Duration,  Quality,  Severity,  Location,  Timing,  Context,  Modifying  Factors   Of   The   Chief   Complaint       And  Present  Illness  Was   Reviewed   In   Chronological   [place].                                              PATIENT'S  MAIN  CONCERNS INCLUDE :                     1)    PREVIOUS    H/O      STROKE  IN     2016           WITH                                       HOSPITALIZATION  IN  [place]                                 -   ON  ANTI PLATELET   THERAPY  WITH                                    ASA    81   MG  PLAVIX   75   MG    DAILY

## 2025-05-05 ENCOUNTER — HOSPITAL ENCOUNTER (OUTPATIENT)
Dept: NEUROLOGY | Age: 70
Discharge: HOME | End: 2025-05-05
Payer: MEDICARE

## 2025-05-05 DIAGNOSIS — F01.53 VASCULAR DEMENTIA WITH MOOD DISTURBANCE, UNSPECIFIED DEMENTIA SEVERITY (HCC): ICD-10-CM

## 2025-05-05 DIAGNOSIS — Z86.73 H/O: STROKE: ICD-10-CM

## 2025-05-05 DIAGNOSIS — I63.89 INFARCTION OF TEMPORAL LOBE (HCC): ICD-10-CM

## 2025-05-05 DIAGNOSIS — I10 ESSENTIAL HYPERTENSION: ICD-10-CM

## 2025-05-05 DIAGNOSIS — Z87.898 H/O SYNCOPE: ICD-10-CM

## 2025-05-05 PROCEDURE — 95813 EEG EXTND MNTR 61-119 MIN: CPT

## 2025-05-05 PROCEDURE — 93886 INTRACRANIAL COMPLETE STUDY: CPT

## 2025-05-05 PROCEDURE — 93892 TCD EMBOLI DETECT W/O INJ: CPT

## 2025-05-05 NOTE — PROGRESS NOTES
TCD Completed with Emboli Detection.    PCP: Med Trevino MD    Ordering: George Pagan Neurologist    Interpreting Physician: Mariella Henriquez    Electronically signed by Omayra Conrad RCP on 5/5/2025 at 1:17 PM

## 2025-05-05 NOTE — PROGRESS NOTES
EXTENDED EEG Completed with Carter Analysis.    PCP: Med Trevino MD    Ordering: George Pagan Neurologist    Interpreting Physician: Ej Conley Neurologfransisco    Technician: Omayra Conrad RCP

## 2025-05-06 ENCOUNTER — HOSPITAL ENCOUNTER (OUTPATIENT)
Dept: INTERVENTIONAL RADIOLOGY/VASCULAR | Age: 70
Discharge: HOME | End: 2025-05-08
Payer: MEDICARE

## 2025-05-06 ENCOUNTER — HOSPITAL ENCOUNTER (OUTPATIENT)
Dept: CT IMAGING | Age: 70
Discharge: HOME | End: 2025-05-08
Payer: MEDICARE

## 2025-05-06 DIAGNOSIS — I63.89 INFARCTION OF TEMPORAL LOBE (HCC): ICD-10-CM

## 2025-05-06 DIAGNOSIS — F01.53 VASCULAR DEMENTIA WITH MOOD DISTURBANCE, UNSPECIFIED DEMENTIA SEVERITY (HCC): ICD-10-CM

## 2025-05-06 DIAGNOSIS — Z86.73 H/O: STROKE: ICD-10-CM

## 2025-05-06 LAB
VAS LEFT CCA DIST EDV: 21.5 CM/S
VAS LEFT CCA DIST PSV: 62.9 CM/S
VAS LEFT CCA MID EDV: 26.9 CM/S
VAS LEFT CCA MID PSV: 72 CM/S
VAS LEFT CCA PROX EDV: 26.9 CM/S
VAS LEFT CCA PROX PSV: 70.2 CM/S
VAS LEFT ECA EDV: 19.8 CM/S
VAS LEFT ECA PSV: 76 CM/S
VAS LEFT ICA DIST EDV: 25.2 CM/S
VAS LEFT ICA DIST PSV: 57.9 CM/S
VAS LEFT ICA MID EDV: 21.6 CM/S
VAS LEFT ICA MID PSV: 37.9 CM/S
VAS LEFT ICA PROX EDV: 17.9 CM/S
VAS LEFT ICA PROX PSV: 44.9 CM/S
VAS LEFT ICA/CCA PSV: 0.8 NO UNITS
VAS LEFT VERTEBRAL EDV: 10.3 CM/S
VAS LEFT VERTEBRAL PSV: 25.3 CM/S
VAS RIGHT CCA DIST EDV: 19.3 CM/S
VAS RIGHT CCA DIST PSV: 47.8 CM/S
VAS RIGHT CCA MID EDV: 20.6 CM/S
VAS RIGHT CCA MID PSV: 56.9 CM/S
VAS RIGHT CCA PROX EDV: 21.9 CM/S
VAS RIGHT CCA PROX PSV: 65.9 CM/S
VAS RIGHT ECA EDV: 25.9 CM/S
VAS RIGHT ECA PSV: 95.6 CM/S
VAS RIGHT ICA DIST EDV: 32.5 CM/S
VAS RIGHT ICA DIST PSV: 65.1 CM/S
VAS RIGHT ICA MID EDV: 19.8 CM/S
VAS RIGHT ICA MID PSV: 41.5 CM/S
VAS RIGHT ICA PROX EDV: 21.4 CM/S
VAS RIGHT ICA PROX PSV: 51.8 CM/S
VAS RIGHT ICA/CCA PSV: 1.1 NO UNITS
VAS RIGHT VERTEBRAL EDV: 10.7 CM/S
VAS RIGHT VERTEBRAL PSV: 30.7 CM/S

## 2025-05-06 PROCEDURE — 93880 EXTRACRANIAL BILAT STUDY: CPT

## 2025-05-06 PROCEDURE — 70450 CT HEAD/BRAIN W/O DYE: CPT

## 2025-05-06 NOTE — PROCEDURES
embolic shower events were detected.    IMPRESSION:    1. There is moderate diffuse intracranial small vessel disease process.  2. Moderate vertebrobasilar stenosis.  3. TCD of intracranial arteries for emboli detection is negative.  Further clinical correlation and followup recommended.          JAMMIE MARIANO MD      D:  05/06/2025 10:16:51     T:  05/06/2025 11:28:03     ANGELA/PIOTR  Job #:  [id number]     Doc#:  [id number]

## 2025-05-06 NOTE — PROCEDURES
[facility]                [address] [place] [address]                       ELECTROENCEPHALOGRAM REPORT      PATIENT NAME: NENO ZURITA           : 1955  MED REC NO: [id number]                         ROOM:   ACCOUNT NO: [id number]                       ADMIT DATE: 2025  PROVIDER: Jammie Pagan MD      REFERRING PHYSICIAN:  JAMMIE PAGAN    TECHNIQUE:  23 channels of EEG, 2 channels of EOG, 2 channel of EKG, and 1 channel ground and 1 channel reference were recorded with [employer] 32 channel system utilizing the International 10/20 system protocol.    Carter detection and seizure analysis protocols were utilized, and the study was reviewed using the comprehensive EEG monitoring.    This is an extended EEG recorded for 1 hour and 2 minutes.    CLINICAL DATA:  The patient is 69 years old with a history of previous stroke, cerebral infarction, dementia, syncope, memory loss.    The purpose of the study is to evaluate for associated seizure activity.    MEDICATIONS:  Aricept, Flexeril.    BACKGROUND:  While the patient was awake, the background activity consisted of fairly regulated 9 to 10 hertz waveform seen over both cerebral hemispheres.    ACTIVATION PROCEDURES:  HYPERVENTILATION:  Hyperventilation was performed for 3 minutes. Patient was cooperative with good effort.  Also, post-hyperventilation period was monitored closely.  Hyperventilation:  Unremarkable.    PHOTIC STIMULATION:  Photic stimulation was performed at the following frequencies: 1 Hz, 3 Hz, 6 Hz, 9 Hz, 12 Hz, 15 Hz, 18 Hz, 21 Hz, 25 Hz, 30 Hz and patient tolerated well.  Photic stimulation:  Mild bilateral symmetric driving response noted.    SLEEP:  Stage 1 and 2.    EKG:  EKG showed normal sinus rhythm without any significant abnormality.    IMPRESSION:  No significant focal, lateralized, or epileptiform features noted during the current recording.    Further

## 2025-06-18 NOTE — PATIENT INSTRUCTIONS
*   ADEQUATE   FLUID  INTAKE   AND  ELECTROLYTE  BALANCE             * KEEP  DAIRY  OF   THE  NEUROLOGICAL  SYMPTOMS          *  TO  MAINTAIN  REGULAR  SLEEP  WAKE  CYCLES.     *   TO  HAVE  ADEQUATE  REST  AND   SLEEP    HOURS.          *    AVOID  USAGE OF   TOBACCO,  EXCESSIVE  ALCOHOL                AND   ILLEGAL   SUBSTANCES,  IF  ANY          *  Maintain   Healthy  Life Style    With   Periodic  Monitoring  Of         Any  Medical  Conditions  Including   Elevated  Blood  Pressure,  Lipid  Profile,       Blood  Sugar levels  And   Heart  Disease.                *   Period   Screening  For  Cancers  Involving  Breast,  Colon,         Lungs  And  Other  Organs  As  Applicable,           In consultation   With  Your  Primary Care Providers.                *  If   There is  Any  Significant  Worsening   Of  Current  Symptoms  And             Or  If    Any additional  New  Neurological  Symptoms  and          Significant  Concerns   Should  Call  911 or  Go  To  Emergency  Department            For  Further  Immediate  Evaluation.       Patient is currently inpatient.

## 2025-07-22 ENCOUNTER — OFFICE VISIT (OUTPATIENT)
Dept: INTERNAL MEDICINE | Age: 70
End: 2025-07-22
Payer: MEDICARE

## 2025-07-22 VITALS
SYSTOLIC BLOOD PRESSURE: 124 MMHG | DIASTOLIC BLOOD PRESSURE: 86 MMHG | OXYGEN SATURATION: 96 % | WEIGHT: 219 LBS | HEART RATE: 96 BPM | RESPIRATION RATE: 16 BRPM | BODY MASS INDEX: 31.35 KG/M2 | HEIGHT: 70 IN

## 2025-07-22 DIAGNOSIS — Z00.00 GENERAL MEDICAL EXAM: ICD-10-CM

## 2025-07-22 DIAGNOSIS — I49.9 IRREGULAR HEART BEAT: ICD-10-CM

## 2025-07-22 DIAGNOSIS — Z00.00 MEDICARE ANNUAL WELLNESS VISIT, SUBSEQUENT: Primary | ICD-10-CM

## 2025-07-22 DIAGNOSIS — E11.42 TYPE 2 DIABETES MELLITUS WITH DIABETIC POLYNEUROPATHY, WITHOUT LONG-TERM CURRENT USE OF INSULIN (HCC): ICD-10-CM

## 2025-07-22 DIAGNOSIS — E78.2 MIXED HYPERLIPIDEMIA: ICD-10-CM

## 2025-07-22 DIAGNOSIS — I10 ESSENTIAL HYPERTENSION: ICD-10-CM

## 2025-07-22 DIAGNOSIS — Z87.891 PERSONAL HISTORY OF TOBACCO USE: ICD-10-CM

## 2025-07-22 PROCEDURE — G0296 VISIT TO DETERM LDCT ELIG: HCPCS | Performed by: INTERNAL MEDICINE

## 2025-07-22 PROCEDURE — 99212 OFFICE O/P EST SF 10 MIN: CPT | Performed by: INTERNAL MEDICINE

## 2025-07-22 PROCEDURE — 93005 ELECTROCARDIOGRAM TRACING: CPT | Performed by: INTERNAL MEDICINE

## 2025-07-22 SDOH — ECONOMIC STABILITY: FOOD INSECURITY: WITHIN THE PAST 12 MONTHS, THE FOOD YOU BOUGHT JUST DIDN'T LAST AND YOU DIDN'T HAVE MONEY TO GET MORE.: NEVER TRUE

## 2025-07-22 SDOH — ECONOMIC STABILITY: FOOD INSECURITY: WITHIN THE PAST 12 MONTHS, YOU WORRIED THAT YOUR FOOD WOULD RUN OUT BEFORE YOU GOT MONEY TO BUY MORE.: NEVER TRUE

## 2025-07-22 ASSESSMENT — ENCOUNTER SYMPTOMS
EYE PAIN: 0
DIARRHEA: 0
BACK PAIN: 0
COUGH: 0
SHORTNESS OF BREATH: 0
VOMITING: 0
BLOOD IN STOOL: 0
CONSTIPATION: 0
NAUSEA: 0
ABDOMINAL PAIN: 0

## 2025-07-22 ASSESSMENT — PATIENT HEALTH QUESTIONNAIRE - PHQ9
SUM OF ALL RESPONSES TO PHQ QUESTIONS 1-9: 0
1. LITTLE INTEREST OR PLEASURE IN DOING THINGS: NOT AT ALL
SUM OF ALL RESPONSES TO PHQ QUESTIONS 1-9: 0
SUM OF ALL RESPONSES TO PHQ QUESTIONS 1-9: 0
2. FEELING DOWN, DEPRESSED OR HOPELESS: NOT AT ALL
SUM OF ALL RESPONSES TO PHQ QUESTIONS 1-9: 0

## 2025-07-22 ASSESSMENT — LIFESTYLE VARIABLES
HOW MANY STANDARD DRINKS CONTAINING ALCOHOL DO YOU HAVE ON A TYPICAL DAY: PATIENT DOES NOT DRINK
HOW OFTEN DO YOU HAVE A DRINK CONTAINING ALCOHOL: NEVER

## 2025-07-22 NOTE — PATIENT INSTRUCTIONS
[employer].   Care instructions adapted under license by [employer]. If you have questions about a medical condition or this instruction, always ask your healthcare professional. [facility] [employer], disclaims any warranty or liability for your use of this information.         A Healthy Heart: Care Instructions  Overview     Coronary artery disease, also called heart disease, occurs when a substance called plaque builds up in the vessels that supply oxygen-rich blood to your heart muscle. This can narrow the blood vessels and reduce blood flow. A heart attack happens when blood flow is completely blocked. A high-fat diet, smoking, and other factors increase the risk of heart disease.  Your doctor has found that you have a chance of having heart disease. A heart-healthy lifestyle can help keep your heart healthy and prevent heart disease. This lifestyle includes eating healthy, being active, staying at a weight that's healthy for you, and not smoking or using tobacco. It also includes taking medicines as directed, managing other health conditions, and trying to get a healthy amount of sleep.  Follow-up care is a key part of your treatment and safety. Be sure to make and go to all appointments, and call your doctor if you are having problems. It's also a good idea to know your test results and keep a list of the medicines you take.  How can you care for yourself at home?  Diet    Use less salt when you cook and eat. This helps lower your blood pressure. Taste food before salting. Add only a little salt when you think you need it. With time, your taste buds will adjust to less salt.     Eat fewer snack items, fast foods, canned soups, and other high-salt, high-fat, processed foods.     Read food labels and try to avoid saturated and trans fats. They increase your risk of heart disease by raising cholesterol levels.     Limit the amount of solid fat--butter, margarine, and shortening--you eat. Use olive,

## 2025-07-22 NOTE — PROGRESS NOTES
Medicare Annual Wellness Visit    Alexx Aguero is here for Medicare AWV, Hypertension, Hyperlipidemia, and Diabetes    Assessment & Plan        Return in about 6 months (around 1/29/2026) for Hypertension, Hyperlipidemia, Diabetes.     Subjective       Patient's complete Health Risk Assessment and screening values have been reviewed and are found in Flowsheets. The following problems were reviewed today and where indicated follow up appointments were made and/or referrals ordered.    Positive Risk Factor Screenings with Interventions:              Inactivity:  On average, how many days per week do you engage in moderate to strenuous exercise (like a brisk walk)?: 0 days (!) Abnormal  On average, how many minutes do you engage in exercise at this level?: 0 min  Interventions:  Patient declined any further interventions or treatment     Abnormal BMI (obese):  Body mass index is 31.42 kg/m². (!) Abnormal  Interventions:  Patient declines any further evaluation or treatment          Vision Screen:  Do you have difficulty driving, watching TV, or doing any of your daily activities because of your eyesight?: No  Have you had an eye exam within the past year?: (!) No  Interventions:   Patient declines any further evaluation or treatment      ADL's:   Patient reports needing help with:  Select all that apply: (!) Banking/Finances, Transportation, Taking Medications, Shopping  Interventions:  Wife helps out with his ADL's      Lung Cancer Screening:                    Objective   Vitals:    07/22/25 1638   BP: 124/86   BP Site: Left Upper Arm   Patient Position: Sitting   BP Cuff Size: Large Adult   Pulse: 96   Resp: 16   SpO2: 96%   Weight: 99.3 kg (219 lb)   Height: 1.778 m (5' 10\")      Body mass index is 31.42 kg/m².                No Known Allergies  Prior to Visit Medications    Medication Sig Taking? Authorizing Provider   clopidogrel (PLAVIX) 75 MG tablet TAKE 1 TABLET BY MOUTH DAILY Yes George Pagan, 
ESTABLISHED, MOD MDM, 30-39 MIN [17652]      3. Type 2 diabetes mellitus with diabetic polyneuropathy, without long-term current use of insulin (HCC)  Hemoglobin A1C    ESTABLISHED, MOD MDM, 30-39 MIN [13723]      4. Essential hypertension  ESTABLISHED, MOD MDM, 30-39 MIN [88598]      5. General medical exam        6. Personal history of tobacco use  CO VISIT TO DISCUSS LUNG CA SCREEN W LDCT    CT Lung Screen (Initial/Annual/Baseline)    ESTABLISHED, MOD MDM, 30-39 MIN [33366]      7. Irregular heart beat  EKG 12 lead    EKG 12 lead      I reviewed multiple test results.    2024 okay A1c at 6.6    2025 EEG showed no seizures    2025 head CT showed no acute findings, but did show old infarcts in bilateral occipital and bilateral temporal lobes.    Today 2025 EKG showed normal sinus rhythm with heart rate of 90.    Patient told to continue Plavix, Lipitor, aspirin, and Aricept.         Plan:    Assessment & Plan   Return in about 6 months (around 2026) for Hypertension, Hyperlipidemia, Diabetes.    Orders Placed This Encounter   Procedures    CT Lung Screen (Initial/Annual/Baseline)     Age: Patient is 70 y.o.    Smoking History:   Tobacco Use      Smoking status: Former        Packs/day: 0.00        Years: 2.0 packs/day for 30.4 years (60.8 ttl pk-yrs)        Types: Cigarettes        Start date:         Quit date: 2016        Years since quittin.1      Smokeless tobacco: Never        Date of last lung cancer screenin2022     Standing Status:   Future     Expected Date:   2025     Expiration Date:   2027     Is there documentation of shared decision making?:   Yes     Is this a low dose CT or a routine CT?:   Low Dose CT [1]     Is this the first (baseline) CT or an annual exam?:   Annual [2]     Does the patient show any signs or symptoms of lung cancer?:   No     Smoking Status?:   Former [4]     Date quit smoking?:   2016     Pack Years:   61    Lipid, Fasting